# Patient Record
Sex: FEMALE | Race: BLACK OR AFRICAN AMERICAN | NOT HISPANIC OR LATINO | Employment: UNEMPLOYED | URBAN - METROPOLITAN AREA
[De-identification: names, ages, dates, MRNs, and addresses within clinical notes are randomized per-mention and may not be internally consistent; named-entity substitution may affect disease eponyms.]

---

## 2018-10-04 ENCOUNTER — HOSPITAL ENCOUNTER (EMERGENCY)
Facility: CLINIC | Age: 23
Discharge: HOME | End: 2018-10-04
Attending: EMERGENCY MEDICINE | Admitting: EMERGENCY MEDICINE
Payer: COMMERCIAL

## 2018-10-04 ENCOUNTER — APPOINTMENT (OUTPATIENT)
Dept: GENERAL RADIOLOGY | Facility: CLINIC | Age: 23
End: 2018-10-04
Attending: EMERGENCY MEDICINE
Payer: COMMERCIAL

## 2018-10-04 VITALS
BODY MASS INDEX: 22.98 KG/M2 | RESPIRATION RATE: 16 BRPM | TEMPERATURE: 98.8 F | OXYGEN SATURATION: 100 % | WEIGHT: 143 LBS | DIASTOLIC BLOOD PRESSURE: 47 MMHG | HEIGHT: 66 IN | SYSTOLIC BLOOD PRESSURE: 106 MMHG | HEART RATE: 72 BPM

## 2018-10-04 DIAGNOSIS — S29.011A CHEST WALL MUSCLE STRAIN, INITIAL ENCOUNTER: ICD-10-CM

## 2018-10-04 LAB — HCG UR QL: NEGATIVE

## 2018-10-04 PROCEDURE — 71046 X-RAY EXAM CHEST 2 VIEWS: CPT

## 2018-10-04 PROCEDURE — 99284 EMERGENCY DEPT VISIT MOD MDM: CPT

## 2018-10-04 PROCEDURE — 81025 URINE PREGNANCY TEST: CPT | Performed by: EMERGENCY MEDICINE

## 2018-10-04 PROCEDURE — 25000132 ZZH RX MED GY IP 250 OP 250 PS 637: Performed by: EMERGENCY MEDICINE

## 2018-10-04 RX ORDER — CYCLOBENZAPRINE HCL 10 MG
10 TABLET ORAL ONCE
Status: COMPLETED | OUTPATIENT
Start: 2018-10-04 | End: 2018-10-04

## 2018-10-04 RX ORDER — IBUPROFEN 400 MG/1
800 TABLET, FILM COATED ORAL ONCE
Status: COMPLETED | OUTPATIENT
Start: 2018-10-04 | End: 2018-10-04

## 2018-10-04 RX ORDER — CYCLOBENZAPRINE HCL 10 MG
10 TABLET ORAL 3 TIMES DAILY PRN
Qty: 15 TABLET | Refills: 0 | Status: SHIPPED | OUTPATIENT
Start: 2018-10-04 | End: 2019-04-16

## 2018-10-04 RX ORDER — TRAMADOL HYDROCHLORIDE 50 MG/1
50-100 TABLET ORAL EVERY 6 HOURS PRN
Qty: 12 TABLET | Refills: 0 | Status: SHIPPED | OUTPATIENT
Start: 2018-10-04 | End: 2019-04-16

## 2018-10-04 RX ADMIN — CYCLOBENZAPRINE HYDROCHLORIDE 10 MG: 10 TABLET, FILM COATED ORAL at 08:13

## 2018-10-04 RX ADMIN — IBUPROFEN 800 MG: 400 TABLET ORAL at 08:13

## 2018-10-04 ASSESSMENT — ENCOUNTER SYMPTOMS
SHORTNESS OF BREATH: 0
COUGH: 0
MYALGIAS: 1

## 2018-10-04 NOTE — DISCHARGE INSTRUCTIONS
Ibuprofen 800 mg 3 times a day, ice, Flexeril as needed for spasm.  Tramadol for more severe pain as needed.  Recheck in the clinic early next week if not resolving.    Opioid Medication Information  You have been given a prescription for an opioid (narcotic) pain medicine and/or have received a pain medicine while here in the emergency department. These medicines can make you drowsy or impaired. You must not drive, operate dangerous equipment, or engage in any other dangerous activities while taking these medications. If you drive while taking these medications, you could be arrested for DUI, or driving under the influence. Do not drink any alcohol while you are taking these medications.   Opioid pain medications can cause addiction. If you have a history of chemical dependency of any type, you are at a higher risk of becoming addicted to pain medications. Only take these prescribed medications to treat your pain when all other options have been tried. Take it for as short a time and as few doses as possible. Store your pain pills in a secure place, as they are frequently stolen and provide a dangerous opportunity for children or visitors in your house to start abusing these powerful medications. We will not replace any lost or stolen medicine. As soon as your pain is better, you should flush all your remaining medication.   Many prescription pain medications contain Tylenol (acetaminophen), including Vicodin, Tylenol #3, Norco, Lortab, and Percocet. You should not take any extra pills of Tylenol if you are using these prescription medications or you can get very sick. Do not ever take more than 4000 mg of acetaminophen in any 24 hour period.  All opioids tend to cause constipation. Drink plenty of water and eat foods that have a lot of fiber, such as fruits, vegetables, prune juice, apple juice and high fiber cereal. Take a laxative if you don t move your bowels at least every other day. Miralax, Milk of Magnesia,  Colace, or Senna can be used to keep you regular.

## 2018-10-04 NOTE — ED AVS SNAPSHOT
Emergency Department    [address] [place] [id number]    Phone:  [phone]    Fax:  [phone]                                       Marielena Rodriguez   MRN: [id number]    Department:   Emergency Department   Date of Visit:  10/4/2018           Patient Information     Date Of Birth          1995        Your diagnoses for this visit were:     Chest wall muscle strain, initial encounter        You were seen by Britni Martin MD.      Follow-up Information     Schedule an appointment as soon as possible for a visit with No Ref-Primary, Physician.    Why:  If symptoms worsen        Discharge Instructions       Ibuprofen 800 mg 3 times a day, ice, Flexeril as needed for spasm.  Tramadol for more severe pain as needed.  Recheck in the clinic early next week if not resolving.    Opioid Medication Information  You have been given a prescription for an opioid (narcotic) pain medicine and/or have received a pain medicine while here in the emergency department. These medicines can make you drowsy or impaired. You must not drive, operate dangerous equipment, or engage in any other dangerous activities while taking these medications. If you drive while taking these medications, you could be arrested for DUI, or driving under the influence. Do not drink any alcohol while you are taking these medications.   Opioid pain medications can cause addiction. If you have a history of chemical dependency of any type, you are at a higher risk of becoming addicted to pain medications. Only take these prescribed medications to treat your pain when all other options have been tried. Take it for as short a time and as few doses as possible. Store your pain pills in a secure place, as they are frequently stolen and provide a dangerous opportunity for children or visitors in your house to start abusing these powerful medications. We will not replace any lost or stolen medicine. As soon as your pain is better,  you should flush all your remaining medication.   Many prescription pain medications contain Tylenol (acetaminophen), including Vicodin, Tylenol #3, Norco, Lortab, and Percocet. You should not take any extra pills of Tylenol if you are using these prescription medications or you can get very sick. Do not ever take more than 4000 mg of acetaminophen in any 24 hour period.  All opioids tend to cause constipation. Drink plenty of water and eat foods that have a lot of fiber, such as fruits, vegetables, prune juice, apple juice and high fiber cereal. Take a laxative if you don t move your bowels at least every other day. Miralax, Milk of Magnesia, Colace, or Senna can be used to keep you regular.             Discharge References/Attachments     CHEST WALL STRAIN (CHILD) (ENGLISH)      24 Hour Appointment Hotline       To make an appointment at any [facility], call [phone] ([phone]). If you don't have a family doctor or clinic, we will help you find one. [place] clinics are conveniently located to serve the needs of you and your family.             Review of your medicines      START taking        Dose / Directions Last dose taken    traMADol 50 MG tablet   Commonly known as:  ULTRAM   Dose:   mg   Quantity:  12 tablet        Take 1-2 tablets ( mg) by mouth every 6 hours as needed for severe pain   Refills:  0                Information about OPIOIDS     PRESCRIPTION OPIOIDS: WHAT YOU NEED TO KNOW   We gave you an opioid (narcotic) pain medicine. It is important to manage your pain, but opioids are not always the best choice. You should first try all the other options your care team gave you. Take this medicine for as short a time (and as few doses) as possible.    Some activities can increase your pain, such as bandage changes or therapy sessions. It may help to take your pain medicine 30 to 60 minutes before these activities. Reduce your stress by getting enough sleep, working on hobbies  you enjoy and practicing relaxation or meditation. Talk to your care team about ways to manage your pain beyond prescription opioids.    These medicines have risks:    DO NOT drive when on new or higher doses of pain medicine. These medicines can affect your alertness and reaction times, and you could be arrested for driving under the influence (DUI). If you need to use opioids long-term, talk to your care team about driving.    DO NOT operate heavy machinery    DO NOT do any other dangerous activities while taking these medicines.    DO NOT drink any alcohol while taking these medicines.     If the opioid prescribed includes acetaminophen, DO NOT take with any other medicines that contain acetaminophen. Read all labels carefully. Look for the word  acetaminophen  or  Tylenol.  Ask your pharmacist if you have questions or are unsure.    You can get addicted to pain medicines, especially if you have a history of addiction (chemical, alcohol or substance dependence). Talk to your care team about ways to reduce this risk.    All opioids tend to cause constipation. Drink plenty of water and eat foods that have a lot of fiber, such as fruits, vegetables, prune juice, apple juice and high-fiber cereal. Take a laxative (Miralax, milk of magnesia, Colace, Senna) if you don t move your bowels at least every other day. Other side effects include upset stomach, sleepiness, dizziness, throwing up, tolerance (needing more of the medicine to have the same effect), physical dependence and slowed breathing.    Store your pills in a secure place, locked if possible. We will not replace any lost or stolen medicine. If you don t finish your medicine, please throw away (dispose) as directed by your pharmacist. The [place] [employer] has more information about safe disposal: https://www.pca.[facility].[place].us/living-green/managing-unwanted-medications        Prescriptions were sent or printed at these locations (1 Prescription)                    Other Prescriptions                Printed at Department/Unit printer (1 of 1)         traMADol (ULTRAM) 50 MG tablet                Procedures and tests performed during your visit     Chest XR,  PA & LAT    HCG qualitative urine      Orders Needing Specimen Collection     None      Pending Results     Date and Time Order Name Status Description    10/4/2018 0839 Chest XR,  PA & LAT In process             Pending Culture Results     No orders found from 10/2/2018 to 10/5/2018.            Pending Results Instructions     If you had any lab results that were not finalized at the time of your Discharge, you can call the ED Lab Result RN at [phone]. You will be contacted by this team for any positive Lab results or changes in treatment. The nurses are available 7 days a week from 10A to 6:30P.  You can leave a message 24 hours per day and they will return your call.        Test Results From Your Hospital Stay        10/4/2018  8:33 AM      Component Results     Component Value Ref Range & Units Status    HCG Qual Urine Negative NEG^Negative Final    This test is for screening purposes.  Results should be interpreted along with   the clinical picture.  Confirmation testing is available if warranted by   ordering ZMI032, HCG Quantitative Pregnancy.           10/4/2018  9:37 AM      Result not yet available     Exam Ended                Clinical Quality Measure: Blood Pressure Screening     Your blood pressure was checked while you were in the emergency department today. The last reading we obtained was  BP: 106/47 . Please read the guidelines below about what these numbers mean and what you should do about them.  If your systolic blood pressure (the top number) is less than 120 and your diastolic blood pressure (the bottom number) is less than 80, then your blood pressure is normal. There is nothing more that you need to do about it.  If your systolic blood pressure (the top number) is 120-139 or  "your diastolic blood pressure (the bottom number) is 80-89, your blood pressure may be higher than it should be. You should have your blood pressure rechecked within a year by a primary care provider.  If your systolic blood pressure (the top number) is 140 or greater or your diastolic blood pressure (the bottom number) is 90 or greater, you may have high blood pressure. High blood pressure is treatable, but if left untreated over time it can put you at risk for heart attack, stroke, or kidney failure. You should have your blood pressure rechecked by a primary care provider within the next 4 weeks.  If your provider in the emergency department today gave you specific instructions to follow-up with your doctor or provider even sooner than that, you should follow that instruction and not wait for up to 4 weeks for your follow-up visit.        Thank you for choosing [facility]       Thank you for choosing [facility] for your care. Our goal is always to provide you with excellent care. Hearing back from our patients is one way we can continue to improve our services. Please take a few minutes to complete the written survey that you may receive in the mail after you visit with us. Thank you!        [facility] Information     [facility] lets you send messages to your doctor, view your test results, renew your prescriptions, schedule appointments and more. To sign up, go to [email or web address] . Click on \"Log in\" on the left side of the screen, which will take you to the Welcome page. Then click on \"Sign up Now\" on the right side of the page.     You will be asked to enter the access code listed below, as well as some personal information. Please follow the directions to create your username and password.     Your access code is: [email or web address]  Expires: 2019  9:40 AM     Your access code will  in 90 days. If you need help or a new code, please call your [facility] clinic or [phone].        Care EveryWhere ID     This " is your Care EveryWhere ID. This could be used by other organizations to access your [facility] medical records  [id number]        Equal Access to Services     SUHAIL CORREIA : Orly Gonsalves, glenn go, alivia miller, jess gracia. So [facility] [phone].    ATENCIÓN: Si habla español, tiene a du disposición servicios gratuitos de asistencia lingüística. Llame al [phone].    We comply with applicable federal civil rights laws and [place] laws. We do not discriminate on the basis of race, color, national origin, age, disability, sex, sexual orientation, or gender identity.            After Visit Summary       This is your record. Keep this with you and show to your community pharmacist(s) and doctor(s) at your next visit.

## 2018-10-04 NOTE — ED AVS SNAPSHOT
Emergency Department    [address] [facility] [address]    Phone:  [phone]    Fax:  [phone]                                       Marielena Rodriguez   MRN: [id number]    Department:   Emergency Department   Date of Visit:  10/4/2018           After Visit Summary Signature Page     I have received my discharge instructions, and my questions have been answered. I have discussed any challenges I see with this plan with the nurse or doctor.    ..........................................................................................................................................  Patient/Patient Representative Signature      ..........................................................................................................................................  Patient Representative Print Name and Relationship to Patient    ..................................................               ................................................  Date                                   Time    ..........................................................................................................................................  Reviewed by Signature/Title    ...................................................              ..............................................  Date                                               Time          22EPIC Rev 08/18

## 2018-10-04 NOTE — ED PROVIDER NOTES
"  History     Chief Complaint:  Breast Pain    HPI   Marielena Rodriguez is a 23 year old female who presents to the emergency department today for evaluation of right breast and rib pain. The patient describes 3 days of pain on the lateral right breast that wraps around to her back. She notes the pain is worsened when she takes a deep breath, twists, or lifts her right arm up. She is unaware of any injury but states that she does some light lifting at work and recently moved. She denies any coughing, sneezing, shortness of breath, leg swelling/pain, recent surgery, or recent travel. She has not taken anything for the pain.     Allergies:  No Known Drug Allergies    Medications:    Medications reviewed. No current medications.     Past Medical History:    Medical history reviewed. No pertinent medical history.    Past Surgical History:    Surgical history reviewed. No pertinent surgical history.    Family History:    Family history reviewed. No pertinent family history.     Social History:  Smoking Status: Never Smoker  Alcohol Use: Negative     Review of Systems   HENT: Negative for sneezing.    Respiratory: Negative for cough and shortness of breath.    Cardiovascular: Negative for leg swelling.   Musculoskeletal: Positive for myalgias.   All other systems reviewed and are negative.      Physical Exam     Patient Vitals for the past 24 hrs:   BP Temp Temp src Pulse Heart Rate Resp SpO2 Height Weight   10/04/18 0955 - - - 72 - 16 100 % - -   10/04/18 0740 106/47 98.8  F (37.1  C) Oral - 78 16 100 % 1.676 m (5' 6\") 64.9 kg (143 lb)       Physical Exam  Nursing note and vitals reviewed.    Constitutional:  Appears well-developed and well-nourished, comfortable except when taking a deep breath or moving around and then she grimaces.  Lymph:  No enlarged or tender cervical or submandibular lymph nodes.   Cardiovascular:  Normal rate, regular rhythm with normal S1 and S2.      Normal heart sounds and peripheral pulses 2+ " and equal.       No murmur or kevin.  Pulmonary:  Effort normal and breath sounds clear to auscultation bilaterally but she does splint when taking a deep breath due to pain on the right side.         No respiratory distress.  No stridor.     No wheezes. No rales.     Reproducible chest wall tenderness in the intercostal space in the midaxillary line extending towards the lateral breast on the right.  No rash, no swelling or bruising.  GI:    Soft. No distension and no mass. No right upper quadrant tenderness.      No rebound and no guarding. No flank pain.  No HSM.  Musculoskeletal:  Normal range of motion. No extremity deformity.     No edema and no tenderness.  No cyanosis.  Negative Homans sign.     Reproducible tenderness over the right lateral chest at the breast level at the intercostal space and breast tissue.   Skin:    Skin is warm and dry. No rash noted. No diaphoresis.      No erythema. No pallor.  No lesions.  Psychiatric:   Behavior is normal. Appropriate mood and affect.     Judgment and thought content normal.         Emergency Department Course   Imaging:  Radiology findings were communicated with the patient who voiced understanding of the findings.    Chest XR,  PA & LAT  Negative exam.  Reading per radiology     Laboratory:  Laboratory findings were communicated with the patient who voiced understanding of the findings.    HCG Qualitative Urine: Negative    Interventions:  0813 Advil 800 mg PO  0813 Flexeril 10 mg PO    Emergency Department Course:    0754 Nursing notes and vitals reviewed.    0758 I performed an exam of the patient as documented above.     0937 The patient was sent for a Chest XR,  PA & LAT while in the emergency department, results above.     0958 I personally reviewed the imaging and laboratory results with the patient and answered all related questions prior to discharge.    Impression & Plan      Medical Decision Making:  Marielena Rodriguez is a 23 year old female who  presents to the emergency department today for evaluation of right lateral chest pain.  Hurts when she moves around.  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  They also had moved recently but states she did not lift anything heavy.  She is not short of breath but it does hurt when she takes a deep breath.  No risk factors for PE and she is PERC rule negative.  She has not been sick recently.  I gave her 800 of Motrin orally and 10 of Flexeril orally.  Pregnancy test was obtained and was negative, a chest x-ray is ordered and is normal.  The ibuprofen and Flexeril only gave her slight relief from the pain.  I do not feel she needs further workup at this time.  This is musculoskeletal.  I am going to prescribe her some tramadol for the more severe pain and have her follow-up in the clinic next week if this is not resolving.    Diagnosis:    ICD-10-CM    1. Chest wall muscle strain, initial encounter S29.011A      Disposition:   The patient is discharged to home. Ibuprofen 800 mg 3 times a day, ice, Flexeril as needed for spasm. Tramadol for more severe pain as needed.  Recheck in the clinic early next week if not resolving.    Discharge Medications:  Discharge Medication List as of 10/4/2018  9:49 AM      START taking these medications    Details   traMADol (ULTRAM) 50 MG tablet Take 1-2 tablets ( mg) by mouth every 6 hours as needed for severe pain, Disp-12 tablet, R-0, Local Print           Scribe Disclosure:  Jade ORTIZ, am serving as a scribe at 7:54 AM on 10/4/2018 to document services personally performed by Britni Martin MD based on my observations and the provider's statements to me.     EMERGENCY DEPARTMENT       Britni Martin MD  10/04/18 8759

## 2018-11-12 ENCOUNTER — OFFICE VISIT (OUTPATIENT)
Dept: OBGYN | Facility: CLINIC | Age: 23
End: 2018-11-12
Payer: COMMERCIAL

## 2018-11-12 VITALS — WEIGHT: 145 LBS | SYSTOLIC BLOOD PRESSURE: 98 MMHG | BODY MASS INDEX: 23.4 KG/M2 | DIASTOLIC BLOOD PRESSURE: 68 MMHG

## 2018-11-12 DIAGNOSIS — Z30.46 NEXPLANON REMOVAL: Primary | ICD-10-CM

## 2018-11-12 PROCEDURE — 11982 REMOVE DRUG IMPLANT DEVICE: CPT | Performed by: ADVANCED PRACTICE MIDWIFE

## 2018-11-12 NOTE — PROGRESS NOTES
Nexplanon Removal:     Is a pregnancy test required: No.  Was a consent obtained?  Yes    Marielena Rodriguez is here for removal of etonogestrel implant Nexplanon/Implanon    Indication: Patient has been satisfied with ease of use of Nexplanon and has not been bothered by irregular bleeding, but she would like to be free from contraception. She is newly  and she and her  are not actively seeking pregnancy, but would be fine if she were to become pregnant.       Preoperative Diagnosis: etonogestrel implant  Postoperative Diagnosis: etonogestrel implant removed    Technique: On the left arm  Skin prep Betadine  Anesthesia 1% lidocaine, with epi  Procedure: Small incision (<5mm) was made at distal end of palpable implant, curved hemostat  was used to isolate the implant and bring it to the incision, the fibrous capsule containing the implant  was incised and the Implant was removed intact.    EBL: minimal  Complications:  No  Tolerance:  Pt tolerated procedure well and was in stable condition.   Dressing:    A pressure bandage was placed for the next 12-24 hours.    Contraception was discussed and patient chose the following method none.   Counseled patient that with immediate removal she is considered fertile and possibly could become pregnant. Educated her that condoms would be necessary to help prevent pregnancy from this point. She cannot remember the exact date of her LMP, but knows it was the end of November. Informed patient that if she does not resume menses in 3-4 weeks, she should take a pregnancy test. Informed patient that if she changes her mind about needing contraception she can return to the clinic for a new method.       Follow up: Pt was instructed to call if bleeding, severe pain or foul smell.     Maddie Piña CNM

## 2018-11-12 NOTE — NURSING NOTE
"Chief Complaint   Patient presents with     Consult     nexplanon removal, placed 6/2018       Initial BP 98/68 (BP Location: Right arm, Cuff Size: Adult Regular)  Wt 145 lb (65.8 kg)  LMP 09/23/2018 (Approximate)  BMI 23.4 kg/m2 Estimated body mass index is 23.4 kg/(m^2) as calculated from the following:    Height as of 10/4/18: 5' 6\" (1.676 m).    Weight as of this encounter: 145 lb (65.8 kg).  BP completed using cuff size: regular    Questioned patient about current smoking habits.  Pt. has never smoked.      No obstetric history on file.    The following HM Due: NONE      Kely Lockhart CMA             "

## 2018-11-12 NOTE — MR AVS SNAPSHOT
"              After Visit Summary   2018    Marielena Rodriguez    MRN: [id number]           Patient Information     Date Of Birth          1995        Visit Information        Provider Department      2018 5:00 PM Maddie Piña CNM [facility]        Today's Diagnoses     Nexplanon removal    -  1       Follow-ups after your visit        Who to contact     If you have questions or need follow up information about today's clinic visit or your schedule please contact [facility] directly at [phone].  Normal or non-critical lab and imaging results will be communicated to you by Lendinohart, letter or phone within 4 business days after the clinic has received the results. If you do not hear from us within 7 days, please contact the clinic through Lendinohart or phone. If you have a critical or abnormal lab result, we will notify you by phone as soon as possible.  Submit refill requests through Brainsgate or call your pharmacy and they will forward the refill request to us. Please allow 3 business days for your refill to be completed.          Additional Information About Your Visit        MyChart Information     Brainsgate lets you send messages to your doctor, view your test results, renew your prescriptions, schedule appointments and more. To sign up, go to [email or web address] . Click on \"Log in\" on the left side of the screen, which will take you to the Welcome page. Then click on \"Sign up Now\" on the right side of the page.     You will be asked to enter the access code listed below, as well as some personal information. Please follow the directions to create your username and password.     Your access code is: [email or web address]  Expires: 2/10/2019  5:04 PM     Your access code will  in 90 days. If you need help or a new code, please call your [facility] or [phone].        Care EveryWhere ID     This is your Care EveryWhere ID. This could be " used by other organizations to access your [facility] medical records  [id number]        Your Vitals Were     Last Period BMI (Body Mass Index)                09/23/2018 (Approximate) 23.4 kg/m2           Blood Pressure from Last 3 Encounters:   11/12/18 98/68   10/04/18 106/47    Weight from Last 3 Encounters:   11/12/18 145 lb (65.8 kg)   10/04/18 143 lb (64.9 kg)              We Performed the Following     [facility]        Primary Care Provider Fax #    Physician No Ref-Primary [phone]       No address on file        Equal Access to Services     [facility]: Hadii ernestine sandso Soaliza, waaxda luqadaha, qaybta kaalmada angela, jess plata . So [facility] [phone].    ATENCIÓN: Si habla español, tiene a du disposición servicios gratuitos de asistencia lingüística. Llame al [phone].    We comply with applicable federal civil rights laws and [place] laws. We do not discriminate on the basis of race, color, national origin, age, disability, sex, sexual orientation, or gender identity.            Thank you!     Thank you for choosing [facility]  for your care. Our goal is always to provide you with excellent care. Hearing back from our patients is one way we can continue to improve our services. Please take a few minutes to complete the written survey that you may receive in the mail after your visit with us. Thank you!             Your Updated Medication List - Protect others around you: Learn how to safely use, store and throw away your medicines at www.disposemymeds.org.          This list is accurate as of 11/12/18 11:59 PM.  Always use your most recent med list.                   Brand Name Dispense Instructions for use Diagnosis    cyclobenzaprine 10 MG tablet    FLEXERIL    15 tablet    Take 1 tablet (10 mg) by mouth 3 times daily as needed for muscle spasms        traMADol 50 MG tablet    ULTRAM    12 tablet    Take 1-2 tablets  ( mg) by mouth every 6 hours as needed for severe pain

## 2018-12-10 ENCOUNTER — OFFICE VISIT (OUTPATIENT)
Dept: OBGYN | Facility: CLINIC | Age: 23
End: 2018-12-10
Payer: COMMERCIAL

## 2018-12-10 VITALS — DIASTOLIC BLOOD PRESSURE: 58 MMHG | BODY MASS INDEX: 24.39 KG/M2 | SYSTOLIC BLOOD PRESSURE: 92 MMHG | WEIGHT: 151.1 LBS

## 2018-12-10 DIAGNOSIS — N91.2 AMENORRHEA: Primary | ICD-10-CM

## 2018-12-10 DIAGNOSIS — Z23 NEED FOR PROPHYLACTIC VACCINATION AND INOCULATION AGAINST INFLUENZA: ICD-10-CM

## 2018-12-10 LAB — BETA HCG QUAL IFA URINE: NEGATIVE

## 2018-12-10 PROCEDURE — 90471 IMMUNIZATION ADMIN: CPT | Performed by: ADVANCED PRACTICE MIDWIFE

## 2018-12-10 PROCEDURE — 99213 OFFICE O/P EST LOW 20 MIN: CPT | Mod: 25 | Performed by: ADVANCED PRACTICE MIDWIFE

## 2018-12-10 PROCEDURE — 84703 CHORIONIC GONADOTROPIN ASSAY: CPT | Performed by: ADVANCED PRACTICE MIDWIFE

## 2018-12-10 PROCEDURE — 90686 IIV4 VACC NO PRSV 0.5 ML IM: CPT | Performed by: ADVANCED PRACTICE MIDWIFE

## 2018-12-10 NOTE — NURSING NOTE
"Chief Complaint   Patient presents with     Amenorrhea     Concerned of no menses since IUD removal 1 month ago.   States she desires pregnancy    Initial BP 92/58   Wt 68.5 kg (151 lb 1.6 oz)   BMI 24.39 kg/m   Estimated body mass index is 24.39 kg/m  as calculated from the following:    Height as of 10/4/18: 1.676 m (5' 6\").    Weight as of this encounter: 68.5 kg (151 lb 1.6 oz).  BP completed using cuff size: regular    Questioned patient about current smoking habits.  Pt. has never smoked.          The following HM Due: Vaccinations: flu shot      orders have been placed     Daphney Foy CMA               "

## 2018-12-10 NOTE — PROGRESS NOTES

## 2018-12-10 NOTE — PROGRESS NOTES
S: Marielena presents to the clinic s/p Nexplanon removal 4 weeks ago. She and her  are currently seeking pregnancy and she is concerned that she has not gotten her period since removal. Prior to nexplanon insertion, she had regular monthly cycles and had the nexplanon in for approximately 6 months.    O:BP 92/58   Wt 68.5 kg (151 lb 1.6 oz)   BMI 24.39 kg/m    UPT: Negative  Review Of Systems  Respiratory: No shortness of breath, dyspnea on exertion, cough, or hemoptysis  Genitourinary: amenorrhea    A: Amenorrhea  Preconception planning    P:  Informed patient that the majority of women resume their menstrual cycle within 4 weeks of nexplanon removal, but some require more time.   Instructed her that if she does not get a period in 2-3 weeks, she should take a pregnancy test at home. If it is positive, return to clinic to assume prenatal care.  If she does not resume a cycle in 3 months, she can return to the clinic to discuss further.  Counseled patient to begin taking a daily prenatal vitamin.     15 minutes was spent face to face with the patient today discussing her history, diagnosis, and follow-up plan as noted above. Over 50% of the visit was spent in counseling and coordination of care.    Total Visit Time: 15 minutes.       Maddie Piña CNM on 12/10/2018 at 5:44 PM

## 2019-01-15 NOTE — PROGRESS NOTES
[facility]   Clinic Visit s Jan 16, 2019    PCP: No Ref-Primary, Physician      Marielena is a 23 year old female who is seen as self referral for   Chief Complaint   Patient presents with     Spine - Pain     Middle Back - Pain       Injury: Reports insidious onset without acute precipitating event.    Location of Pain: midline upper back, nonradiating   Duration of Pain: 1 month(s)  Rating of Pain at worst: 8/10  Rating of Pain Currently: 7/10  Pain is better with: nothing   Pain is worse with: work responsibilities, carrying (trash, groceries, laundry), bending over,   Treatment so far consists of:  lumbar support, pillow on chair    Associated symptoms: no distal numbness or tingling; denies swelling or warmth  Recent imaging completed: No recent imaging completed.  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         Family History   Problem Relation Age of Onset     No Known Problems Mother      No Known Problems Father      Diabetes Maternal Grandmother        Social History     Socioeconomic History     Marital status: Single     Spouse name: Not on file     Number of children: Not on file     Years of education: Not on file     Highest education level: Not on file   Social Needs     Financial resource strain: Not on file     Food insecurity - worry: Not on file     Food insecurity - inability: Not on file     Transportation needs - medical: Not on file     Transportation needs - non-medical: Not on file   Occupational History     Not on file   Tobacco Use     Smoking status: Never Smoker     Smokeless tobacco: Never Used   Substance and Sexual Activity     Alcohol use: No     Drug use: No       Past Surgical History:   Procedure Laterality Date     NO HISTORY OF SURGERY  12/10/2018             Review of Systems   Constitutional: Negative for chills, diaphoresis, fever,  "malaise/fatigue and weight loss.   Musculoskeletal: Positive for back pain.   Neurological: Negative for tingling, sensory change and focal weakness.   All other systems reviewed and are negative.        Physical Exam  /55   Ht 1.676 m (5' 6\")   Wt 70.3 kg (155 lb)   BMI 25.02 kg/m    Constitutional:well-developed, well-nourished, and in no distress.   Cardiovascular: Intact distal pulses.    Neurological: alert. Gait Normal:   Gait, station, stance, and balance appear normal for age  Skin: Skin is warm and dry.   Psychiatric: Mood and affect normal.   Respiratory: unlabored, speaks in full sentences  Lymph: no LAD, no lymphangitis      Back Exam     Tenderness   The patient is experiencing tenderness in the thoracic.    Range of Motion   Extension: normal   Flexion: normal   Lateral bend right: normal   Lateral bend left: normal   Rotation right: normal   Rotation left: normal     Comments:  Mild hyperlordosis    Tightness noted with rotation in both directions          ASSESSMENT/PLAN    ICD-10-CM    1. Chronic midline thoracic back pain M54.6 [facility]    G89.29      Postural component suspected to thoracic back pain, with myofascial restrictions in both directions.  Reviewed ergonomics with patient regarding workspace.  Referred to physical therapy for manual therapy for mobilization.   "

## 2019-01-16 ENCOUNTER — OFFICE VISIT (OUTPATIENT)
Dept: ORTHOPEDICS | Facility: CLINIC | Age: 24
End: 2019-01-16
Payer: COMMERCIAL

## 2019-01-16 VITALS
DIASTOLIC BLOOD PRESSURE: 55 MMHG | WEIGHT: 155 LBS | HEIGHT: 66 IN | SYSTOLIC BLOOD PRESSURE: 101 MMHG | BODY MASS INDEX: 24.91 KG/M2

## 2019-01-16 DIAGNOSIS — M54.6 CHRONIC MIDLINE THORACIC BACK PAIN: Primary | ICD-10-CM

## 2019-01-16 DIAGNOSIS — G89.29 CHRONIC MIDLINE THORACIC BACK PAIN: Primary | ICD-10-CM

## 2019-01-16 PROCEDURE — 99203 OFFICE O/P NEW LOW 30 MIN: CPT | Performed by: FAMILY MEDICINE

## 2019-01-16 ASSESSMENT — ENCOUNTER SYMPTOMS
TINGLING: 0
DIAPHORESIS: 0
WEIGHT LOSS: 0
CHILLS: 0
BACK PAIN: 1
SENSORY CHANGE: 0
FEVER: 0
FOCAL WEAKNESS: 0

## 2019-01-16 ASSESSMENT — MIFFLIN-ST. JEOR: SCORE: 1484.83

## 2019-01-16 NOTE — LETTER
1/16/2019         RE: Marielena Rodriguez  [address]  [place] [address]        Dear Colleague,    Thank you for referring your patient, Marielena Rodriguze, to the  SPORTS MEDICINE. Please see a copy of my visit note below.    HPI   [facility]   Clinic Visit s Jan 16, 2019    PCP: No Ref-Primary, Physician      Marielena is a 23 year old female who is seen as self referral for   Chief Complaint   Patient presents with     Spine - Pain     Middle Back - Pain       Injury: Reports insidious onset without acute precipitating event.    Location of Pain: midline upper back, nonradiating   Duration of Pain: 1 month(s)  Rating of Pain at worst: 8/10  Rating of Pain Currently: 7/10  Pain is better with: nothing   Pain is worse with: work responsibilities, carrying (trash, groceries, laundry), bending over,   Treatment so far consists of:  lumbar support, pillow on chair    Associated symptoms: no distal numbness or tingling; denies swelling or warmth  Recent imaging completed: No recent imaging completed.  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         Family History   Problem Relation Age of Onset     No Known Problems Mother      No Known Problems Father      Diabetes Maternal Grandmother        Social History     Socioeconomic History     Marital status: Single     Spouse name: Not on file     Number of children: Not on file     Years of education: Not on file     Highest education level: Not on file   Social Needs     Financial resource strain: Not on file     Food insecurity - worry: Not on file     Food insecurity - inability: Not on file     Transportation needs - medical: Not on file     Transportation needs - non-medical: Not on file   Occupational History     Not on file   Tobacco Use     Smoking status: Never Smoker     Smokeless tobacco: Never Used   Substance and  "Sexual Activity     Alcohol use: No     Drug use: No       Past Surgical History:   Procedure Laterality Date     NO HISTORY OF SURGERY  12/10/2018             Review of Systems   Constitutional: Negative for chills, diaphoresis, fever, malaise/fatigue and weight loss.   Musculoskeletal: Positive for back pain.   Neurological: Negative for tingling, sensory change and focal weakness.   All other systems reviewed and are negative.        Physical Exam  /55   Ht 1.676 m (5' 6\")   Wt 70.3 kg (155 lb)   BMI 25.02 kg/m     Constitutional:well-developed, well-nourished, and in no distress.   Cardiovascular: Intact distal pulses.    Neurological: alert. Gait Normal:   Gait, station, stance, and balance appear normal for age  Skin: Skin is warm and dry.   Psychiatric: Mood and affect normal.   Respiratory: unlabored, speaks in full sentences  Lymph: no LAD, no lymphangitis      Back Exam     Tenderness   The patient is experiencing tenderness in the thoracic.    Range of Motion   Extension: normal   Flexion: normal   Lateral bend right: normal   Lateral bend left: normal   Rotation right: normal   Rotation left: normal     Comments:  Mild hyperlordosis    Tightness noted with rotation in both directions          ASSESSMENT/PLAN    ICD-10-CM    1. Chronic midline thoracic back pain M54.6 [facility]    G89.29      Postural component suspected to thoracic back pain, with myofascial restrictions in both directions.  Reviewed ergonomics with patient regarding workspace.  Referred to physical therapy for manual therapy for mobilization.     Again, thank you for allowing me to participate in the care of your patient.        Sincerely,        Vamsi Nava MD    "

## 2019-01-18 ENCOUNTER — THERAPY VISIT (OUTPATIENT)
Dept: PHYSICAL THERAPY | Facility: CLINIC | Age: 24
End: 2019-01-18
Payer: COMMERCIAL

## 2019-01-18 DIAGNOSIS — M54.6 CHRONIC MIDLINE THORACIC BACK PAIN: ICD-10-CM

## 2019-01-18 DIAGNOSIS — M54.6 BILATERAL THORACIC BACK PAIN: ICD-10-CM

## 2019-01-18 DIAGNOSIS — G89.29 CHRONIC MIDLINE THORACIC BACK PAIN: ICD-10-CM

## 2019-01-18 PROCEDURE — 97110 THERAPEUTIC EXERCISES: CPT | Mod: GP | Performed by: PHYSICAL THERAPIST

## 2019-01-18 PROCEDURE — 97162 PT EVAL MOD COMPLEX 30 MIN: CPT | Mod: GP | Performed by: PHYSICAL THERAPIST

## 2019-01-25 PROBLEM — M54.6 BILATERAL THORACIC BACK PAIN: Status: ACTIVE | Noted: 2019-01-25

## 2019-01-25 NOTE — PROGRESS NOTES
[facility] Initial [facility]  Subjective:Marielena Rodriguez is a 23 year old female.    Patient s chief complaints: Mid thoracic spine pain which started about 1 month ago. Unknown reason why other than started a new job about 6 weeks ago. 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 C/O pain right upper thoracic spine.    Condition occurred due to insidious onset.  Date of Onset: 12/15/18  Location of symptoms is upper right thoracic spine .  Symptoms other than pain include: aching, throbbing, stabbing.    Quality of pain is aching, throbbing, stabbing and frequency is constant.    Pain dependence on time of day is: worse end of day.   Pain rating is: 9/10.    Symptoms are exacerbated by: movement, use of arm and upper back .    Symptoms are relieved by:  nothing.    Progression of symptoms is that symptoms are:  Getting worse.  Imaging/Special tests include: none   Previous treatments include: none.   Patient reports that general health is: fair.   Pertinent medical history includes:  Migraines/headaches.    Medical allergies includes: none.   Surgical history includes: none.  Current medications include: none  Current occupation is: patient service rep  Work status is: full time  Primary job tasks include: computer work  Barriers include:  none  Red flags include: none    Patient's expectations for therapy include: Be able to perform all activities       Assessment/Plan:    HPI                    Objective:  System         Lumbar/SI Evaluation              Lumbar Palpation:  Palpation (lumbar): Moderate tightness iliolumbar, lat bilateral.            SI joint/Sacrum:            Sacral conclusion left:  Posterior inominate and sacral torsion       Cervical/Thoracic Evaluation    AROM:  AROM Cervical:    Flexion:          60 degrees  Extension:       50 degrees  Rotation:         Left:     Right:  Side Bend:      Left: 30 degrees      Right:  35 degrees  AROM Thoracic:    Flexion:              Extension:           Rotation:            Left: 35 degrees pain     Right: 45 degrees        Cervical Myotomes:    C1-2 (Neck Flex): Left:  5    Right: 5  C3 (neck side bend): Left: 5    Right: 5  C4 (shrug):  Left: 5    Right: 5  C5 (Deltoid):  Left: 5    Right: 5  C6 (Biceps):  Left: 5    Right: 5  C7 (Triceps):  Left: 5    Right: 5  C8 (Thumb Ext): Left: 5    Right: 5  T1 (Intrinsics): Left: 5    Right: 5  DTR's:  normal        Neural Tension:    Left side:  Ulnar and Median positive.  Right side:  Ulnar and Median positive.  Cervical Dermatomes:  normal                    Cervical Palpation:  : Tender to palpation rhomboid, mid trap, infraspinatus, intercostals, right greatere than left.          Spinal Segmental Conclusions:  Minimal spring ribs 2-5 right.        Cord Sign:  normal                                            General  Patient c/o pain with even light touch to upper thoracic spine region. Unable to perform even light evaluation of thoracic facet joints, rib attachments and muscular tightness due to patient complaints of pain.     ROS    Assessment/Plan:    Patient is a 23 year old female with cervical and thoracic complaints.    Patient has the following significant findings with corresponding treatment plan.                Diagnosis 1:  Thoracic pain  Pain -  hot/cold therapy, manual therapy, self management, education and home program  Decreased ROM/flexibility - manual therapy, therapeutic exercise and therapeutic activity  Impaired posture - neuro re-education, therapeutic activities and home program    Therapy Evaluation Codes:   1) History comprised of:   Personal factors that impact the plan of care:      Past/current experiences.    Comorbidity factors that impact the plan of care are:      Migraines/headaches.     Medications impacting care: None.  2) Examination of Body Systems comprised of:   Body structures and functions that  impact the plan of care:      Cervical spine and Thoracic Spine.   Activity limitations that impact the plan of care are:      Lifting, Reading/Computer work, Sitting, Sleeping and Laying down.  3) Clinical presentation characteristics are:   Stable/Uncomplicated.  4) Decision-Making    Moderate complexity using standardized patient assessment instrument and/or measureable assessment of functional outcome.  Cumulative Therapy Evaluation is: Moderate complexity.    Previous and current functional limitations:  (See Goal Flow Sheet for this information)    Short term and Long term goals: (See Goal Flow Sheet for this information)     Communication ability:  Patient appears to be able to clearly communicate and understand verbal and written communication and follow directions correctly.  Treatment Explanation - The following has been discussed with the patient:   RX ordered/plan of care  Anticipated outcomes  Possible risks and side effects  This patient would benefit from PT intervention to resume normal activities.   Rehab potential is good.    Frequency:  1 X week, once daily  Duration:  for 8 weeks  Discharge Plan:  Achieve all LTG.  Independent in home treatment program.  Reach maximal therapeutic benefit.    Please refer to the daily flowsheet for treatment today, total treatment time and time spent performing 1:1 timed codes.

## 2019-01-25 NOTE — PROGRESS NOTES
Marielena Rodriguez is a 23 year old female.    Patient s chief complaints: Mid thoracic spine pain which started about 1 month ago. Unknown reason why other than started a new job about 6 weeks ago. 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 C/O pain right upper thoracic spine.    Condition occurred due to insidious onset.  Date of Onset: 12/15/18  Location of symptoms is upper right thoracic spine .  Symptoms other than pain include: aching, throbbing, stabbing.    Quality of pain is aching, throbbing, stabbing and frequency is constant.    Pain dependence on time of day is: worse end of day.   Pain rating is: 9/10.    Symptoms are exacerbated by: movement, use of arm and upper back .    Symptoms are relieved by:  nothing.    Progression of symptoms is that symptoms are:  Getting worse.  Imaging/Special tests include: none   Previous treatments include: none.   Patient reports that general health is: fair.   Pertinent medical history includes:  Migraines/headaches.    Medical allergies includes: none.   Surgical history includes: none.  Current medications include: none  Current occupation is: patient service rep  Work status is: full time  Primary job tasks include: computer work  Barriers include:  none  Red flags include: none    Patient's expectations for therapy include: Be able to perform all activities       Assessment/Plan:    {REHAB NOTES:688929}

## 2019-01-31 ENCOUNTER — TELEPHONE (OUTPATIENT)
Dept: MIDWIFE SERVICES | Facility: CLINIC | Age: 24
End: 2019-01-31

## 2019-01-31 ENCOUNTER — OFFICE VISIT (OUTPATIENT)
Dept: INTERNAL MEDICINE | Facility: CLINIC | Age: 24
End: 2019-01-31
Payer: COMMERCIAL

## 2019-01-31 VITALS
WEIGHT: 153 LBS | TEMPERATURE: 97.7 F | SYSTOLIC BLOOD PRESSURE: 124 MMHG | BODY MASS INDEX: 24.69 KG/M2 | HEART RATE: 60 BPM | DIASTOLIC BLOOD PRESSURE: 64 MMHG | RESPIRATION RATE: 16 BRPM

## 2019-01-31 DIAGNOSIS — G44.209 TENSION HEADACHE: ICD-10-CM

## 2019-01-31 DIAGNOSIS — G43.821 MENSTRUAL MIGRAINE WITH STATUS MIGRAINOSUS, NOT INTRACTABLE: Primary | ICD-10-CM

## 2019-01-31 LAB
ALBUMIN UR-MCNC: NEGATIVE MG/DL
APPEARANCE UR: CLEAR
BACTERIA #/AREA URNS HPF: ABNORMAL /HPF
BILIRUB UR QL STRIP: NEGATIVE
COLOR UR AUTO: YELLOW
GLUCOSE UR STRIP-MCNC: NEGATIVE MG/DL
HGB UR QL STRIP: NEGATIVE
KETONES UR STRIP-MCNC: NEGATIVE MG/DL
LEUKOCYTE ESTERASE UR QL STRIP: ABNORMAL
NITRATE UR QL: NEGATIVE
NON-SQ EPI CELLS #/AREA URNS LPF: ABNORMAL /LPF
PH UR STRIP: 6 PH (ref 5–7)
RBC #/AREA URNS AUTO: ABNORMAL /HPF
SOURCE: ABNORMAL
SP GR UR STRIP: 1.02 (ref 1–1.03)
UROBILINOGEN UR STRIP-ACNC: 0.2 EU/DL (ref 0.2–1)
WBC #/AREA URNS AUTO: ABNORMAL /HPF

## 2019-01-31 PROCEDURE — 99214 OFFICE O/P EST MOD 30 MIN: CPT | Performed by: PHYSICIAN ASSISTANT

## 2019-01-31 PROCEDURE — 81001 URINALYSIS AUTO W/SCOPE: CPT | Performed by: PHYSICIAN ASSISTANT

## 2019-01-31 PROCEDURE — 87086 URINE CULTURE/COLONY COUNT: CPT | Performed by: PHYSICIAN ASSISTANT

## 2019-01-31 RX ORDER — IBUPROFEN 400 MG/1
400 TABLET, FILM COATED ORAL EVERY 6 HOURS PRN
Qty: 30 TABLET | Refills: 1 | Status: SHIPPED | OUTPATIENT
Start: 2019-01-31 | End: 2019-11-12

## 2019-01-31 NOTE — PROGRESS NOTES
SUBJECTIVE:   Marielena Rodriguez is a 23 year old female who presents to clinic today for the following health issues:    Headache  Onset: 3 weeks     Description:   Location: bilateral in the frontal area   Character: banging   Frequency:  daily  Duration:  1-3 hours    Intensity: severe    Progression of Symptoms:  worsening    Accompanying Signs & Symptoms:  Stiff neck: no  Neck or upper back pain: YES- middle back pain  Fever: no  Sinus pressure: no  Nausea or vomiting: YES- nausea   Dizziness: YES  Numbness: no  Weakness: YES  Visual changes: no    History:   Head trauma: no  Family history of migraines: no  Previous tests for headaches: no  Neurologist evaluations: no  Able to do daily activities: no  Wake with a headaches: YES- today  Do headaches wake you up: YES  Daily pain medication use: no  Work/school stressors/changes: no    Precipitating factors:   Does light make it worse: no  Does sound make it worse: YES    Alleviating factors:  Does sleep help: no    Therapies Tried and outcome: none    Problem list and histories reviewed & adjusted, as indicated.  Additional history: Patient is taking prenatal vitamins and is attempting to get pregnant.  Took a home pregnancy test this am which was negative.  Hasn't taken any OTC pain medications as of yet.     Reviewed and updated as needed this visit by clinical staff  Tobacco  Allergies  Meds  Med Hx  Surg Hx  Fam Hx  Soc Hx      ROS:  Constitutional, HEENT, cardiovascular, pulmonary, gi and gu systems are negative, except as otherwise noted.    OBJECTIVE:     /64   Pulse 60   Temp 97.7  F (36.5  C) (Oral)   Resp 16   Wt 69.4 kg (153 lb)   LMP 12/25/2018   Breastfeeding? No   BMI 24.69 kg/m    Body mass index is 24.69 kg/m .  GENERAL: healthy, alert and no distress  EYES: Eyes grossly normal to inspection, PERRL and conjunctivae and sclerae normal  HENT: ear canals and TM's normal, nose and mouth without ulcers or lesions  NECK: no  adenopathy, no asymmetry, masses, or scars and thyroid normal to palpation  normal strength, no torticollis and ROM is normal  SKIN: no suspicious lesions or rashes  NEURO: Normal strength and tone, mentation intact and speech normal    Diagnostic Test Results:  Results for orders placed or performed in visit on 01/31/19 (from the past 24 hour(s))   UA reflex to Microscopic and Culture   Result Value Ref Range    Color Urine Yellow     Appearance Urine Clear     Glucose Urine Negative NEG^Negative mg/dL    Bilirubin Urine Negative NEG^Negative    Ketones Urine Negative NEG^Negative mg/dL    Specific Gravity Urine 1.020 1.003 - 1.035    Blood Urine Negative NEG^Negative    pH Urine 6.0 5.0 - 7.0 pH    Protein Albumin Urine Negative NEG^Negative mg/dL    Urobilinogen Urine 0.2 0.2 - 1.0 EU/dL    Nitrite Urine Negative NEG^Negative    Leukocyte Esterase Urine Trace (A) NEG^Negative    Source Midstream Urine    Urine Microscopic   Result Value Ref Range    WBC Urine 0 - 5 OTO5^0 - 5 /HPF    RBC Urine O - 2 OTO2^O - 2 /HPF    Squamous Epithelial /LPF Urine Many (A) FEW^Few /LPF    Bacteria Urine Few (A) NEG^Negative /HPF       ASSESSMENT/PLAN:   1. Menstrual migraine with status migrainosus, not intractable  - UA reflex to Microscopic and Culture  - Urine Microscopic  - Urine Culture Aerobic Bacterial  - ibuprofen (ADVIL/MOTRIN) 400 MG tablet; Take 1 tablet (400 mg) by mouth every 6 hours as needed for pain  Dispense: 30 tablet; Refill: 1    2. Tension headache    Follow up on labs  Use medication as directed.  Repeat UPT in 2 weeks.  Follow up if symptoms should persist, change or worsen.  Patient amenable to this follow up plan.     Kaylan Abreu PA-C  [facility]

## 2019-01-31 NOTE — TELEPHONE ENCOUNTER
Spoke with pt.    Negative HPT today.  Pt states she doesn't feel well, sx for almost 3 weeks intermittently.    HA, N&V, fatigue.  Discussed seeing an IM provider as pt had neg preg test OB Gyn would not be appropriate for visit.    Pt scheduled with IM.    Lianet GOLDEN R.N.  [facility]

## 2019-01-31 NOTE — TELEPHONE ENCOUNTER
Reason for call:  Other   Patient called regarding (reason for call): Pt would like a sooner appointment with any provider before 2/6/19; please advise   Additional comments:     Phone number to reach patient:  Home number on file [phone] (home)    Best Time:  any    Can we leave a detailed message on this number?  Yes

## 2019-02-01 LAB
BACTERIA SPEC CULT: NORMAL
SPECIMEN SOURCE: NORMAL

## 2019-02-15 ENCOUNTER — HEALTH MAINTENANCE LETTER (OUTPATIENT)
Age: 24
End: 2019-02-15

## 2019-04-16 ENCOUNTER — OFFICE VISIT (OUTPATIENT)
Dept: DERMATOLOGY | Facility: CLINIC | Age: 24
End: 2019-04-16
Payer: COMMERCIAL

## 2019-04-16 VITALS — DIASTOLIC BLOOD PRESSURE: 68 MMHG | SYSTOLIC BLOOD PRESSURE: 100 MMHG | HEART RATE: 63 BPM | OXYGEN SATURATION: 99 %

## 2019-04-16 DIAGNOSIS — L81.0 POST-INFLAMMATORY HYPERPIGMENTATION: ICD-10-CM

## 2019-04-16 DIAGNOSIS — L70.0 ACNE VULGARIS: ICD-10-CM

## 2019-04-16 DIAGNOSIS — D48.5 NEOPLASM OF UNCERTAIN BEHAVIOR OF SKIN: Primary | ICD-10-CM

## 2019-04-16 PROCEDURE — 99203 OFFICE O/P NEW LOW 30 MIN: CPT | Mod: 25 | Performed by: PHYSICIAN ASSISTANT

## 2019-04-16 PROCEDURE — 88305 TISSUE EXAM BY PATHOLOGIST: CPT | Mod: TC | Performed by: PHYSICIAN ASSISTANT

## 2019-04-16 PROCEDURE — 11104 PUNCH BX SKIN SINGLE LESION: CPT | Performed by: PHYSICIAN ASSISTANT

## 2019-04-16 RX ORDER — TRETINOIN 0.25 MG/G
CREAM TOPICAL
Qty: 45 G | Refills: 11 | Status: SHIPPED | OUTPATIENT
Start: 2019-04-16 | End: 2019-11-12

## 2019-04-16 RX ORDER — HYDROQUINONE 40 MG/G
CREAM TOPICAL
Qty: 30 G | Refills: 5 | Status: SHIPPED | OUTPATIENT
Start: 2019-04-16 | End: 2020-04-30

## 2019-04-16 NOTE — PATIENT INSTRUCTIONS
Wound Care Instructions     FOR SUTURE CARE     [facility] [phone]                 AFTER 24 HOURS YOU SHOULD REMOVE THE BANDAGE AND BEGIN DAILY DRESSING CHANGES AS FOLLOWS:     1) Remove Dressing.     2) Clean and dry the area with tap water using a Q-tip or sterile gauze pad.     3) Apply Vaseline, Aquaphor, Polysporin ointment or Bacitracin ointment over entire wound.  Do NOT use Neosporin ointment.     4) Cover the wound with a band-aid, or a sterile non-stick gauze pad and micropore paper tape      REPEAT THESE INSTRUCTIONS AT LEAST ONCE A DAY UNTIL THE WOUND HAS COMPLETELY HEALED.    RETURN TO CLINIC FOR A NURSE-ONLY SUTURE REMOVAL APPOINTMENT IN 5 TO 7 DAYS. UNLESS YOUR PROVIDER SPECIFIES OTHERWISE.     It is an old wives tale that a wound heals better when it is exposed to air and allowed to dry out. The wound will heal faster with a better cosmetic result if it is kept moist with ointment and covered with a bandage.    **Do not let the wound dry out.**      Supplies Needed:      *Cotton tipped applicators (Q-tips)    *Vaseline, Aquaphor, Polysporin Ointment or Bacitracin Ointment (NOT NEOSPORIN)    *Band-aids or non-stick gauze pads and micropore paper tape.          PATIENT INFORMATION:  During the healing process you will notice a number of changes. All wounds develop a small halo of redness surrounding the wound.  This means healing is occurring. Severe itching with extensive redness usually indicates sensitivity to the ointment or bandage tape used to dress the wound.  You should call our office if this develops.      Swelling  and/or discoloration around your surgical site is common, particularly when performed around the eye.    After the wound is healed you may discontinue dressing changes.     You may experience a sensation of tightness as your wound heals. This is normal and will gradually subside.    Your healed wound may be sensitive to temperature changes. This sensitivity  improves with time, but if you re having a lot of discomfort, try to avoid temperature extremes.    Patients frequently experience itching after their wound appears to have healed because of the continue healing under the skin.  Plain Vaseline will help relieve the itching.      POSSIBLE COMPLICATIONS    BLEEDIN. Leave the bandage in place.  2. Use tightly rolled up gauze or a cloth to apply direct pressure over the bandage for 30  minutes.  3. Reapply pressure for an additional 30 minutes if necessary  4. Use additional gauze and tape to maintain pressure once the bleeding has stopped.

## 2019-04-16 NOTE — PROGRESS NOTES
HPI:   Chief complaints: Marielena Rodriguez is a 23 year old female who presents for evaluation of dark spot on face that has increased in size. Started out as a small dot and has grown over the past few years. Area is not painful; she definitely was not born with the spot.     Condition present for:  6-7 years.   Previous treatments include: lotion    Additional concern: Dark spots left over after her acne heals    No personal or fhx of skin CA    Review Of Systems  Eyes: negative  Ears/Nose/Throat: negative  Respiratory: No shortness of breath, dyspnea on exertion, cough, or hemoptysis  Cardiovascular: negative  Gastrointestinal: negative  Genitourinary: negative  Musculoskeletal: negative  Neurologic: negative  Psychiatric: negative    This document serves as a record of the services and decisions personally performed and made by Kaylee Candelaria, MS, PA-C. It was created on her behalf by Amarilis Emery, a trained medical scribe. The creation of this document is based on the provider's statements to the medical scribe.  Amarilis Emery 5:13 PM April 16, 2019    PHYSICAL EXAM:    /68   Pulse 63   LMP 04/15/2019   SpO2 99%   Breastfeeding? No   Skin exam performed as follows: Type 5 skin. Mood appropriate  Alert and Oriented X 3. Well developed, well nourished in no distress.  General appearance: Normal  Head including face: Normal  Eyes: conjunctiva and lids: Normal  Mouth: Lips, teeth, gums: Normal  Neck: Normal  Chest-breast/axillae: Normal  Back: Normal  Spleen and liver: Normal  Cardiovascular: Exam of peripheral vascular system by observation for swelling, varicosities, edema: Normal  Genitalia: groin, buttocks: Normal  Extremities: digits/nails (clubbing): Normal  Eccrine and Apocrine glands: Normal  Right upper extremity: Normal  Left upper extremity: Normal  Right lower extremity: Normal  Left lower extremity: Normal  Skin: Scalp and body hair: See below    1. 4 x 3 cm  hyperpigmented patch on right nasal side wall   2. 2+ PIH with 1+ comedones on the face    ASSESSMENT/PLAN:     1. Nevus r/o atypicality on right nasal side wall. Photo taken and placed in chart. After informed written consent was obtained, using Betadine for cleansing and 1% Lidocaine with epinephrine for anesthetic, with sterile technique a 3 mm punch biopsy was used to obtain a biopsy specimen of the lesion (portion of lesion tested). Hemostasis was obtained by pressure and wound was sutured with 6-0 nylon. Antibiotic dressing is applied, and wound care instructions provided. Be alert for any signs of cutaneous infection. The specimen is labeled and sent to pathology for evaluation. The procedure was well tolerated without complications. Suture removal in 5-7 days.     2. Acne Vulgaris with PIH- advised on diagnosis and treatment options. Discussed use of topical medications and antibiotics.   --Start tretinoin 0.025% cream every day  --Start HQ 4% cream BID x 4-6 months then d/c        Follow-up: pending path/2 months for acne  CC:   Scribed By: Amarilis Emery, Medical Scribe    The information in this document, created by the medical scribe for me, accurately reflects the services I personally performed and the decisions made by me. I have reviewed and approved this document for accuracy prior to leaving the patient care area.  April 16, 2019 5:24 PM    Kaylee Candelaria MS, PAMakaylaC

## 2019-04-16 NOTE — LETTER
4/16/2019         RE: Marielena Rodriguez  [address]  [facility] [address]        Dear Colleague,    Thank you for referring your patient, Marielena Rodriguez, to the [facility]. Please see a copy of my visit note below.    HPI:   Chief complaints: Marielena Rodriguez is a 23 year old female who presents for evaluation of dark spot on face that has increased in size. Started out as a small dot and has grown over the past few years. Area is not painful; she definitely was not born with the spot.     Condition present for:  6-7 years.   Previous treatments include: lotion    Additional concern: Dark spots left over after her acne heals    No personal or fhx of skin CA    Review Of Systems  Eyes: negative  Ears/Nose/Throat: negative  Respiratory: No shortness of breath, dyspnea on exertion, cough, or hemoptysis  Cardiovascular: negative  Gastrointestinal: negative  Genitourinary: negative  Musculoskeletal: negative  Neurologic: negative  Psychiatric: negative    This document serves as a record of the services and decisions personally performed and made by Kaylee Candelaria, MS, PA-C. It was created on her behalf by Amarilis Emery, a trained medical scribe. The creation of this document is based on the provider's statements to the medical scribe.  Amarilis Emery 5:13 PM April 16, 2019    PHYSICAL EXAM:    /68   Pulse 63   LMP 04/15/2019   SpO2 99%   Breastfeeding? No   Skin exam performed as follows: Type 5 skin. Mood appropriate  Alert and Oriented X 3. Well developed, well nourished in no distress.  General appearance: Normal  Head including face: Normal  Eyes: conjunctiva and lids: Normal  Mouth: Lips, teeth, gums: Normal  Neck: Normal  Chest-breast/axillae: Normal  Back: Normal  Spleen and liver: Normal  Cardiovascular: Exam of peripheral vascular system by observation for swelling, varicosities, edema: Normal  Genitalia: groin, buttocks:  Normal  Extremities: digits/nails (clubbing): Normal  Eccrine and Apocrine glands: Normal  Right upper extremity: Normal  Left upper extremity: Normal  Right lower extremity: Normal  Left lower extremity: Normal  Skin: Scalp and body hair: See below    1. 4 x 3 cm hyperpigmented patch on right nasal side wall   2. 2+ PIH with 1+ comedones on the face    ASSESSMENT/PLAN:     1. Nevus r/o atypicality on right nasal side wall. Photo taken and placed in chart. After informed written consent was obtained, using Betadine for cleansing and 1% Lidocaine with epinephrine for anesthetic, with sterile technique a 3 mm punch biopsy was used to obtain a biopsy specimen of the lesion (portion of lesion tested). Hemostasis was obtained by pressure and wound was sutured with 6-0 nylon. Antibiotic dressing is applied, and wound care instructions provided. Be alert for any signs of cutaneous infection. The specimen is labeled and sent to pathology for evaluation. The procedure was well tolerated without complications. Suture removal in 5-7 days.     2. Acne Vulgaris with PIH- advised on diagnosis and treatment options. Discussed use of topical medications and antibiotics.   --Start tretinoin 0.025% cream every day  --Start HQ 4% cream BID x 4-6 months then d/c        Follow-up: pending path/2 months for acne  CC:   Scribed By: Amarilis Emery Medical Scribe    The information in this document, created by the medical scribe for me, accurately reflects the services I personally performed and the decisions made by me. I have reviewed and approved this document for accuracy prior to leaving the patient care area.  April 16, 2019 5:24 PM    Kaylee Candelaria MS, PAMakaylaC        Again, thank you for allowing me to participate in the care of your patient.        Sincerely,        Kaylee Candelaria PA-C

## 2019-04-19 LAB — COPATH REPORT: NORMAL

## 2019-04-22 ENCOUNTER — TELEPHONE (OUTPATIENT)
Dept: DERMATOLOGY | Facility: CLINIC | Age: 24
End: 2019-04-22

## 2019-04-22 NOTE — TELEPHONE ENCOUNTER
Notes recorded by Kaylee Candelaria PA-C on 4/22/2019 at 9:44 AM CDT  Right NSW came back benign - not a mole but normal oil gland growth. For treatment, the hydroquinone I gave her should help. Try not to rub the area at all.

## 2019-04-22 NOTE — TELEPHONE ENCOUNTER
Called and LM for patient to call back in regards to biopsy results and providers notes.    Ana RN-BSN  [facility]  [phone]

## 2019-04-23 NOTE — TELEPHONE ENCOUNTER
Sent [employer] message- will check later to see if patient has read.    Karina ReneRN BSN  [facility]  [phone]

## 2019-10-21 ENCOUNTER — TELEPHONE (OUTPATIENT)
Dept: OBGYN | Facility: CLINIC | Age: 24
End: 2019-10-21

## 2019-10-21 NOTE — TELEPHONE ENCOUNTER
Positive pregnancy test and has some questions prior to first appointment .    LMP 9/8/19-  6w 1d    Informed her of fist appointment  around 8-10 weeks.      Spoke about getting PVN with [facility]    Avoiding cat feces, raw meats and unpasteurized dairy and juices.     Pt verbalized understanding, in agreement with plan, and voiced no further questions.       Transferred to scheduling.  Jaye Bravo RN on 10/21/2019 at 9:08 AM

## 2019-10-31 ENCOUNTER — TELEPHONE (OUTPATIENT)
Dept: OBGYN | Facility: CLINIC | Age: 24
End: 2019-10-31

## 2019-10-31 ENCOUNTER — HOSPITAL ENCOUNTER (EMERGENCY)
Facility: CLINIC | Age: 24
Discharge: HOME | End: 2019-10-31
Attending: EMERGENCY MEDICINE | Admitting: EMERGENCY MEDICINE
Payer: MEDICAID

## 2019-10-31 VITALS
SYSTOLIC BLOOD PRESSURE: 100 MMHG | RESPIRATION RATE: 16 BRPM | WEIGHT: 160 LBS | DIASTOLIC BLOOD PRESSURE: 72 MMHG | BODY MASS INDEX: 25.71 KG/M2 | OXYGEN SATURATION: 98 % | HEART RATE: 79 BPM | TEMPERATURE: 97.9 F | HEIGHT: 66 IN

## 2019-10-31 DIAGNOSIS — O21.0 HYPEREMESIS GRAVIDARUM: ICD-10-CM

## 2019-10-31 LAB
ANION GAP SERPL CALCULATED.3IONS-SCNC: 6 MMOL/L (ref 3–14)
B-HCG SERPL-ACNC: ABNORMAL IU/L (ref 0–5)
BASOPHILS # BLD AUTO: 0 10E9/L (ref 0–0.2)
BASOPHILS NFR BLD AUTO: 0.4 %
BUN SERPL-MCNC: 8 MG/DL (ref 7–30)
CALCIUM SERPL-MCNC: 8.1 MG/DL (ref 8.5–10.1)
CHLORIDE SERPL-SCNC: 109 MMOL/L (ref 94–109)
CO2 SERPL-SCNC: 23 MMOL/L (ref 20–32)
CREAT SERPL-MCNC: 0.53 MG/DL (ref 0.52–1.04)
DIFFERENTIAL METHOD BLD: ABNORMAL
EOSINOPHIL # BLD AUTO: 0.1 10E9/L (ref 0–0.7)
EOSINOPHIL NFR BLD AUTO: 0.8 %
ERYTHROCYTE [DISTWIDTH] IN BLOOD BY AUTOMATED COUNT: 13.1 % (ref 10–15)
GFR SERPL CREATININE-BSD FRML MDRD: >90 ML/MIN/{1.73_M2}
GLUCOSE SERPL-MCNC: 71 MG/DL (ref 70–99)
HCT VFR BLD AUTO: 37.6 % (ref 35–47)
HGB BLD-MCNC: 13.2 G/DL (ref 11.7–15.7)
IMM GRANULOCYTES # BLD: 0 10E9/L (ref 0–0.4)
IMM GRANULOCYTES NFR BLD: 0.3 %
LYMPHOCYTES # BLD AUTO: 1.8 10E9/L (ref 0.8–5.3)
LYMPHOCYTES NFR BLD AUTO: 22.1 %
MCH RBC QN AUTO: 28 PG (ref 26.5–33)
MCHC RBC AUTO-ENTMCNC: 35.1 G/DL (ref 31.5–36.5)
MCV RBC AUTO: 80 FL (ref 78–100)
MONOCYTES # BLD AUTO: 1.6 10E9/L (ref 0–1.3)
MONOCYTES NFR BLD AUTO: 19.8 %
NEUTROPHILS # BLD AUTO: 4.5 10E9/L (ref 1.6–8.3)
NEUTROPHILS NFR BLD AUTO: 56.6 %
NRBC # BLD AUTO: 0 10*3/UL
NRBC BLD AUTO-RTO: 0 /100
PLATELET # BLD AUTO: 277 10E9/L (ref 150–450)
POTASSIUM SERPL-SCNC: 3.3 MMOL/L (ref 3.4–5.3)
RBC # BLD AUTO: 4.71 10E12/L (ref 3.8–5.2)
SODIUM SERPL-SCNC: 138 MMOL/L (ref 133–144)
WBC # BLD AUTO: 8 10E9/L (ref 4–11)

## 2019-10-31 PROCEDURE — 25800030 ZZH RX IP 258 OP 636: Performed by: EMERGENCY MEDICINE

## 2019-10-31 PROCEDURE — 85025 COMPLETE CBC W/AUTO DIFF WBC: CPT | Performed by: EMERGENCY MEDICINE

## 2019-10-31 PROCEDURE — 36415 COLL VENOUS BLD VENIPUNCTURE: CPT | Performed by: EMERGENCY MEDICINE

## 2019-10-31 PROCEDURE — 25000128 H RX IP 250 OP 636: Performed by: EMERGENCY MEDICINE

## 2019-10-31 PROCEDURE — 84702 CHORIONIC GONADOTROPIN TEST: CPT | Performed by: EMERGENCY MEDICINE

## 2019-10-31 PROCEDURE — 99284 EMERGENCY DEPT VISIT MOD MDM: CPT | Mod: 25

## 2019-10-31 PROCEDURE — 96361 HYDRATE IV INFUSION ADD-ON: CPT

## 2019-10-31 PROCEDURE — 80048 BASIC METABOLIC PNL TOTAL CA: CPT | Performed by: EMERGENCY MEDICINE

## 2019-10-31 PROCEDURE — 96374 THER/PROPH/DIAG INJ IV PUSH: CPT

## 2019-10-31 RX ORDER — DOXYLAMINE SUCCINATE AND PYRIDOXINE HYDROCHLORIDE, DELAYED RELEASE TABLETS 10 MG/10 MG 10; 10 MG/1; MG/1
1 TABLET, DELAYED RELEASE ORAL EVERY 8 HOURS PRN
Qty: 21 TABLET | Refills: 0 | Status: SHIPPED | OUTPATIENT
Start: 2019-10-31 | End: 2020-04-30

## 2019-10-31 RX ORDER — METOCLOPRAMIDE HYDROCHLORIDE 5 MG/ML
10 INJECTION INTRAMUSCULAR; INTRAVENOUS ONCE
Status: COMPLETED | OUTPATIENT
Start: 2019-10-31 | End: 2019-10-31

## 2019-10-31 RX ORDER — SODIUM CHLORIDE 9 MG/ML
1000 INJECTION, SOLUTION INTRAVENOUS CONTINUOUS
Status: DISCONTINUED | OUTPATIENT
Start: 2019-10-31 | End: 2019-10-31 | Stop reason: HOSPADM

## 2019-10-31 RX ADMIN — METOCLOPRAMIDE 10 MG: 5 INJECTION, SOLUTION INTRAMUSCULAR; INTRAVENOUS at 12:54

## 2019-10-31 RX ADMIN — SODIUM CHLORIDE 1000 ML: 9 INJECTION, SOLUTION INTRAVENOUS at 12:52

## 2019-10-31 ASSESSMENT — ENCOUNTER SYMPTOMS
SHORTNESS OF BREATH: 0
NAUSEA: 1
VOMITING: 1
FEVER: 1
ABDOMINAL PAIN: 0
CONSTIPATION: 1
RHINORRHEA: 1

## 2019-10-31 ASSESSMENT — MIFFLIN-ST. JEOR: SCORE: 1502.51

## 2019-10-31 NOTE — TELEPHONE ENCOUNTER
Reason for call:  Other   Patient called regarding (reason for call): appointment  Additional comments: Patient is approximately 7 weeks pregnant and is suffering from nausea, loss of appetite and weight loss for the past 2 weeks. She is hoping to be seen at  OBGYN today if possible to address her symptoms. She has her first Prenatal visit scheduled at an outside facility, but is not scheduled until 11/12/19. She was advised to see an OB/gyn at [place] in the meantime. Please advise.     Phone number to reach patient:  Home number on file [phone] (home)    Best Time:  Asap    Can we leave a detailed message on this number?  YES

## 2019-10-31 NOTE — ED AVS SNAPSHOT
Emergency Department  [address] [facility] [address]  Phone:  [phone]  Fax:  [phone]                                    Marielena Rodriguez   MRN: [id number]    Department:   Emergency Department   Date of Visit:  10/31/2019           After Visit Summary Signature Page    I have received my discharge instructions, and my questions have been answered. I have discussed any challenges I see with this plan with the nurse or doctor.    ..........................................................................................................................................  Patient/Patient Representative Signature      ..........................................................................................................................................  Patient Representative Print Name and Relationship to Patient    ..................................................               ................................................  Date                                   Time    ..........................................................................................................................................  Reviewed by Signature/Title    ...................................................              ..............................................  Date                                               Time          22EPIC Rev 08/18

## 2019-10-31 NOTE — ED PROVIDER NOTES
"  History     Chief Complaint:  Nausea and Vomiting    The history is provided by the patient.      Marielena Rodriguez is a 24 year old female who presents with nausea and vomiting. The patient is around 7 weeks pregnant and has not establish an OB. Her last menstrual period was September 8th. She has not had any vaginal bleeding in the last couple days. The patient reports some nausea and vomiting for the last couple of weeks. She has not taken any medication for the nausea. She has had a runny nose and congestion. The patient denies any chest pain, shortness of breath, and abdominal cramping. The patient has been constipated.     Allergies:  No known drug allergies.    Medications:    The patient is not currently taking any prescribed medications.     Past Medical History:    Bilateral thoracic back pain    Past Surgical History:    History reviewed. No pertinent past surgical history.    Family History:    Diabetes    Social History:  Patient is single  Tobacco Use: No  Alcohol Use: No  PCP: Physician No Ref-Primary     Review of Systems   Constitutional: Positive for fever.   HENT: Positive for congestion and rhinorrhea.    Respiratory: Negative for shortness of breath.    Cardiovascular: Negative for chest pain.   Gastrointestinal: Positive for constipation, nausea and vomiting. Negative for abdominal pain.   Genitourinary: Negative for vaginal bleeding.   All other systems reviewed and are negative.    Physical Exam   First Vitals:  Patient Vitals for the past 24 hrs:   BP Temp Temp src Heart Rate Resp SpO2 Height Weight   10/31/19 1204 111/69 97.9  F (36.6  C) Temporal 95 16 100 % 1.676 m (5' 6\") 72.6 kg (160 lb)     Physical Exam  Physical Exam   General:  Sitting on bed.   HENT:  No obvious trauma to head  Right Ear:  External ear normal.   Left Ear:  External ear normal.   Nose:  Nose normal.   Eyes:  Conjunctivae and EOM are normal. Pupils are equal, round, and reactive.   Neck: Normal range of motion. Neck " supple. No tracheal deviation present.   CV:  Normal heart sounds. No murmur heard.  Pulm/Chest: Effort normal and breath sounds normal.   Abd: Soft. No distension. There is no tenderness. There is no rigidity, no rebound and no guarding.   M/S: Normal range of motion.   Neuro: Alert. GCS 15.  Skin: Skin is warm and dry. No rash noted. Not diaphoretic.   Psych: Normal mood and affect. Behavior is normal.     Emergency Department Course     Laboratory:  CBC:  WBC 8.0, HGB 13.2,   BMP: Potassium 3.3 low, Calcium 8.1 low, o/w WNL. (Creatinine 0.53)  HC,778 high    Interventions:  1252: NS 1L IV  1254: Reglan 10mg IV    Emergency Department Course:  12:15 PM Nursing notes and vitals reviewed.  I performed an exam of the patient as documented above.     3:20 PM Fetal heart tones found to be in the mid 140s.    3:26 PM Findings and plan explained to the patient. Patient discharged home with instructions regarding supportive care, medications, and reasons to return. The importance of close follow-up was reviewed.     Impression & Plan      Medical Decision Making:  Marielena Rodriguez is a very pleasant 24 year old female who presents for evaluation of nausea and vomiting.  She is currently pregnant.  Nonetheless. I considered a broad differential diagnosis for this patient including viral gastroenteritis, food poisoning, bowel obstruction, intra-abdominal infection such as colitis, cholecystitis, UTI, pyelonephritis, appendicitis, etc.  There are no signs of worrisome intra-abdominal pathologies detected during the visit today.  The patient has a completely benign abdominal exam without rebound, guarding, or marked tenderness to palpation.     Supportive outpatient management is therefore indicated.  Abdominal pain precautions are given for home.  I discussed her potassium is slightly low.  She will increase amount of bananas, orange juice and spinach in her diet.  It was discussed with the patient to return to  the ED for blood in stool, increasing pain, or fevers more than 102.   Feels much improved after interventions in ED.  See above for medications for home.  I believe all of her symptoms are at this point explained by the pregnancy and hyperemesis gravidarum.      The treatment plan was discussed with the patient and they expressed understanding of this plan and consented to the plan.  In addition, the patient will return to the emergency department if their symptoms persist, worsen, if new symptoms arise or if there is any concern as other pathology may be present that is not evident at this time. They also understand the importance of close follow up in the clinic and if unable to do so will return to the emergency department for a reevaluation. All questions were answered.    Diagnosis:    ICD-10-CM    1. Hyperemesis gravidarum O21.0        Disposition:  discharged to home    Discharge Medications:  New Prescriptions    DOXYLAMINE-PYRIDOXINE 10-10 MG TBEC    Take 1 tablet by mouth every 8 hours as needed (nausea)     I, Bradley Aasen, am serving as a scribe on 10/31/2019 at 12:15 PM to personally document services performed by Silverio Garcia DO based on my observations and the provider's statements to me.          Silverio Garcia DO  10/31/19 1528

## 2019-11-05 ENCOUNTER — OFFICE VISIT (OUTPATIENT)
Dept: OBGYN | Facility: CLINIC | Age: 24
End: 2019-11-05
Payer: MEDICAID

## 2019-11-05 VITALS — BODY MASS INDEX: 24.55 KG/M2 | SYSTOLIC BLOOD PRESSURE: 108 MMHG | DIASTOLIC BLOOD PRESSURE: 74 MMHG | WEIGHT: 152.1 LBS

## 2019-11-05 DIAGNOSIS — O21.9 NAUSEA AND VOMITING OF PREGNANCY, ANTEPARTUM: Primary | ICD-10-CM

## 2019-11-05 PROCEDURE — 99213 OFFICE O/P EST LOW 20 MIN: CPT | Performed by: ADVANCED PRACTICE MIDWIFE

## 2019-11-05 RX ORDER — METOCLOPRAMIDE 5 MG/1
10 TABLET ORAL 4 TIMES DAILY PRN
Qty: 60 TABLET | Refills: 1 | Status: SHIPPED | OUTPATIENT
Start: 2019-11-05 | End: 2020-04-30

## 2019-11-05 RX ORDER — PYRIDOXINE HCL (VITAMIN B6) 25 MG
25 TABLET ORAL 3 TIMES DAILY
Qty: 90 TABLET | Refills: 3 | Status: SHIPPED | OUTPATIENT
Start: 2019-11-05 | End: 2020-06-04

## 2019-11-05 NOTE — NURSING NOTE
"Chief Complaint   Patient presents with     Follow Up     ED nausea and vomiting   Pt is pregnant       Initial /74 (BP Location: Right arm, Patient Position: Chair, Cuff Size: Adult Regular)   Wt 69 kg (152 lb 1.6 oz)   LMP 2019 (Exact Date)   Breastfeeding? No   BMI 24.55 kg/m   Estimated body mass index is 24.55 kg/m  as calculated from the following:    Height as of 10/31/19: 1.676 m (5' 6\").    Weight as of this encounter: 69 kg (152 lb 1.6 oz).  BP completed using cuff size: regular    Questioned patient about current smoking habits.  Pt. has never smoked.          The following HM Due: NONE      Karen Browning CMA on 2019 at 1:38 PM         "

## 2019-11-05 NOTE — PATIENT INSTRUCTIONS
Remedies for nausea and vomiting with pregnancy      Eat small frequent meals every 2-3 hours if possible.       Avoid food at extremes of temperature and drinks with carbonation.      Eat foods that appeal to you, avoiding fats and spicy foods.      Avoid liquids with foods.  Drink liquids 30-60 minutes before or after eating and sip slowly.      Bread, pasta, crackers, potatoes, and rice tend to be tolerated the best.      Don't worry about what you eat in the first 3 months, it is more important that you can eat and keep it down.       Try flat ginger ale or ginger tea      Before rising in the morning, eat a small amount of crackers or dry toast.      Peppermint tea      Ginger is a herbal remedy for nausea and you can use it in any form.  There are ginger tablets you can purchase.  The dose 1000 mg a day in divided doses.       You may also try doxylamine (Unisom) 12.5 mg three times a day which is a sleeping medication along with Vitamin B6 25 mg three times a day.  This combination takes up to a week to work so give it some time.       Benadryl (diphenhydramine) 25-50 mg every 8 hour or Dramamine (dimenhydrinate)  mg by mouth every 4-6 hours. Both of these medication may cause some drowsiness      Other things that may help include an Accupressure band and acupuncture      If these methods fail there are many prescriptions that we can try    If you begin to vomit more than 5 or 6 times a day and feel that you are unable to keep anything down, call the [facility] [phone]

## 2019-11-05 NOTE — PROGRESS NOTES
SUBJECTIVE:                                                   Marielena Rodriguez is a 24 year old who presents to clinic today for the following health issue(s):  Patient presents with:  Follow Up: ED nausea and vomiting   Pt is pregnant  Marielena is 8w2d by LMP of 9/8/19. She has not yet established prenatal care. COSME is 6/14/2020. She was seen in ED for nausea/vomiting. She received IV fluids and Reglan which helped. She was sent home with an prescription for Diclegis which she was not able to get as pharmacy does not carry it. She reports she is vomiting about 3 times per day and feeling constantly nauseous. She has lost about 2 pounds since before pregnancy. Today she is able to keep down some ice and popsicles, so she is feeling better.     Problem list and histories reviewed & adjusted, as indicated.  Additional history: as documented.    Patient Active Problem List   Diagnosis     Bilateral thoracic back pain     Past Surgical History:   Procedure Laterality Date     NO HISTORY OF SURGERY  12/10/2018      Social History     Tobacco Use     Smoking status: Never Smoker     Smokeless tobacco: Never Used   Substance Use Topics     Alcohol use: No      Problem (# of Occurrences) Relation (Name,Age of Onset)    Diabetes (1) Maternal Grandmother    No Known Problems (2) Mother, Father            Current Outpatient Medications   Medication Sig     doxylamine (UNISOM) 25 MG TABS tablet Take 1 tablet (25 mg) by mouth At Bedtime     metoclopramide (REGLAN) 5 MG tablet Take 2 tablets (10 mg) by mouth 4 times daily as needed (nausea/vomiting)     vitamin B6 (PYRIDOXINE) 25 MG tablet Take 1 tablet (25 mg) by mouth 3 times daily     Doxylamine-Pyridoxine 10-10 MG TBEC Take 1 tablet by mouth every 8 hours as needed (nausea) (Patient not taking: Reported on 11/5/2019)     hydroquinone (TAMIR) 4 % external cream Apply to AA BID x 4-6 months then stop (Patient not taking: Reported on 11/5/2019)     ibuprofen (ADVIL/MOTRIN)  400 MG tablet Take 1 tablet (400 mg) by mouth every 6 hours as needed for pain (Patient not taking: Reported on 11/5/2019)     tretinoin (RETIN-A) 0.025 % external cream Apply a pea sized amount to entire face QD (Patient not taking: Reported on 11/5/2019)     No current facility-administered medications for this visit.      No Known Allergies    ROS:  12 point review of systems negative other than symptoms noted below.    OBJECTIVE:     /74 (BP Location: Right arm, Patient Position: Chair, Cuff Size: Adult Regular)   Wt 69 kg (152 lb 1.6 oz)   LMP 09/08/2019 (Exact Date)   Breastfeeding? No   BMI 24.55 kg/m    Body mass index is 24.55 kg/m .    PHYSICAL EXAM:  Constitutional:  Appearance: Well nourished, well developed alert, in no acute distress  Chest:  Respiratory Effort:  Breathing unlabored. Clear to auscultation bilaterally.   Neurologic:  Mental Status:  Oriented X3.  Normal strength and tone   Psychiatric:  Mentation appears normal and affect normal/bright.       ASSESSMENT/PLAN:                                                        ICD-10-CM    1. Nausea and vomiting of pregnancy, antepartum O21.9 metoclopramide (REGLAN) 5 MG tablet     vitamin B6 (PYRIDOXINE) 25 MG tablet     doxylamine (UNISOM) 25 MG TABS tablet     Discussed that she can take vitamin B6 & unisom in place of Diclegis  Prescription for Reglan sent to her pharmacy  Follow up in 2-3 days if symptoms don't improve  Reviewed comfort measures in detail and handout given, see AVS    Follow up for nurse visit and new prenatal visit. Reviewed prenatal care at [facility].       Candis Evans CNM, DANIEL-BC

## 2019-11-12 ENCOUNTER — TELEPHONE (OUTPATIENT)
Dept: MIDWIFE SERVICES | Facility: CLINIC | Age: 24
End: 2019-11-12

## 2019-11-12 ENCOUNTER — PRENATAL OFFICE VISIT (OUTPATIENT)
Dept: OBGYN | Facility: CLINIC | Age: 24
End: 2019-11-12
Payer: MEDICAID

## 2019-11-12 VITALS — SYSTOLIC BLOOD PRESSURE: 94 MMHG | WEIGHT: 150 LBS | BODY MASS INDEX: 24.21 KG/M2 | DIASTOLIC BLOOD PRESSURE: 70 MMHG

## 2019-11-12 DIAGNOSIS — O21.9 NAUSEA AND VOMITING IN PREGNANCY PRIOR TO 22 WEEKS GESTATION: ICD-10-CM

## 2019-11-12 DIAGNOSIS — Z34.00 ENCOUNTER FOR SUPERVISION OF NORMAL FIRST PREGNANCY, UNSPECIFIED TRIMESTER: Primary | ICD-10-CM

## 2019-11-12 LAB
ABO + RH BLD: NORMAL
ABO + RH BLD: NORMAL
ALBUMIN UR-MCNC: 30 MG/DL
APPEARANCE UR: ABNORMAL
BACTERIA #/AREA URNS HPF: ABNORMAL /HPF
BILIRUB UR QL STRIP: NEGATIVE
BLD GP AB SCN SERPL QL: NORMAL
BLOOD BANK CMNT PATIENT-IMP: NORMAL
COLOR UR AUTO: YELLOW
ERYTHROCYTE [DISTWIDTH] IN BLOOD BY AUTOMATED COUNT: 12.7 % (ref 10–15)
GLUCOSE UR STRIP-MCNC: 300 MG/DL
HBV SURFACE AG SERPL QL IA: NONREACTIVE
HCT VFR BLD AUTO: 40.9 % (ref 35–47)
HGB BLD-MCNC: 13.9 G/DL (ref 11.7–15.7)
HGB UR QL STRIP: NEGATIVE
HIV 1+2 AB+HIV1 P24 AG SERPL QL IA: NONREACTIVE
KETONES UR STRIP-MCNC: 5 MG/DL
LEUKOCYTE ESTERASE UR QL STRIP: ABNORMAL
MCH RBC QN AUTO: 27.4 PG (ref 26.5–33)
MCHC RBC AUTO-ENTMCNC: 34 G/DL (ref 31.5–36.5)
MCV RBC AUTO: 81 FL (ref 78–100)
MUCOUS THREADS #/AREA URNS LPF: PRESENT /LPF
NITRATE UR QL: NEGATIVE
PH UR STRIP: 6 PH (ref 5–7)
PLATELET # BLD AUTO: 381 10E9/L (ref 150–450)
RBC # BLD AUTO: 5.07 10E12/L (ref 3.8–5.2)
RBC #/AREA URNS AUTO: 7 /HPF (ref 0–2)
RUBV IGG SERPL IA-ACNC: 67 IU/ML
SOURCE: ABNORMAL
SP GR UR STRIP: 1.03 (ref 1–1.03)
SPECIMEN EXP DATE BLD: NORMAL
SQUAMOUS #/AREA URNS AUTO: 26 /HPF (ref 0–1)
T PALLIDUM AB SER QL: NONREACTIVE
UROBILINOGEN UR STRIP-MCNC: 4 MG/DL (ref 0–2)
WBC # BLD AUTO: 8.8 10E9/L (ref 4–11)
WBC #/AREA URNS AUTO: 7 /HPF (ref 0–5)

## 2019-11-12 PROCEDURE — 87340 HEPATITIS B SURFACE AG IA: CPT | Performed by: ADVANCED PRACTICE MIDWIFE

## 2019-11-12 PROCEDURE — 36415 COLL VENOUS BLD VENIPUNCTURE: CPT | Performed by: ADVANCED PRACTICE MIDWIFE

## 2019-11-12 PROCEDURE — 87389 HIV-1 AG W/HIV-1&-2 AB AG IA: CPT | Performed by: ADVANCED PRACTICE MIDWIFE

## 2019-11-12 PROCEDURE — 86780 TREPONEMA PALLIDUM: CPT | Performed by: ADVANCED PRACTICE MIDWIFE

## 2019-11-12 PROCEDURE — 86762 RUBELLA ANTIBODY: CPT | Performed by: ADVANCED PRACTICE MIDWIFE

## 2019-11-12 PROCEDURE — 85027 COMPLETE CBC AUTOMATED: CPT | Performed by: ADVANCED PRACTICE MIDWIFE

## 2019-11-12 PROCEDURE — 87591 N.GONORRHOEAE DNA AMP PROB: CPT | Performed by: ADVANCED PRACTICE MIDWIFE

## 2019-11-12 PROCEDURE — 87086 URINE CULTURE/COLONY COUNT: CPT | Performed by: ADVANCED PRACTICE MIDWIFE

## 2019-11-12 PROCEDURE — 86900 BLOOD TYPING SEROLOGIC ABO: CPT | Performed by: ADVANCED PRACTICE MIDWIFE

## 2019-11-12 PROCEDURE — 86901 BLOOD TYPING SEROLOGIC RH(D): CPT | Performed by: ADVANCED PRACTICE MIDWIFE

## 2019-11-12 PROCEDURE — 99214 OFFICE O/P EST MOD 30 MIN: CPT | Performed by: ADVANCED PRACTICE MIDWIFE

## 2019-11-12 PROCEDURE — 87491 CHLMYD TRACH DNA AMP PROBE: CPT | Performed by: ADVANCED PRACTICE MIDWIFE

## 2019-11-12 PROCEDURE — 81001 URINALYSIS AUTO W/SCOPE: CPT | Performed by: ADVANCED PRACTICE MIDWIFE

## 2019-11-12 PROCEDURE — 86850 RBC ANTIBODY SCREEN: CPT | Performed by: ADVANCED PRACTICE MIDWIFE

## 2019-11-12 RX ORDER — ONDANSETRON 2 MG/ML
4 INJECTION INTRAMUSCULAR; INTRAVENOUS ONCE
Status: CANCELLED | OUTPATIENT
Start: 2019-11-12

## 2019-11-12 RX ORDER — DEXTROSE, SODIUM CHLORIDE, SODIUM LACTATE, POTASSIUM CHLORIDE, AND CALCIUM CHLORIDE 5; .6; .31; .03; .02 G/100ML; G/100ML; G/100ML; G/100ML; G/100ML
1000 INJECTION, SOLUTION INTRAVENOUS ONCE
Status: CANCELLED | OUTPATIENT
Start: 2019-11-12

## 2019-11-12 NOTE — Clinical Note
Can you please call patient and give her the phone number to schedule IV hydration visits? The therapy plan is ordered. ThanksMelida

## 2019-11-12 NOTE — TELEPHONE ENCOUNTER
Called and left message to call clinic back.   Per message from :  Melida Burroughs APRN CNM  P Rd Triage Pod B             Can you please call patient and give her the phone number to schedule IV hydration visits? The therapy plan is ordered. Melida Regan      Phone number for infusion clinic is: [phone].    Delores Piña,

## 2019-11-12 NOTE — PROGRESS NOTES
Pt here with  at [facility] clinic. First pregnancy and she has been very sick with nausea/vomiting. Has lost 10lbs, 3+ ketones in urine today and urine very dark. She was seen in ED and received IV fluids and reports she felt better for a day or two after. Discussed and ordered IV hydration in clinic, pt is interested in trying this but thinks she may only be able to make it once weekly due to work schedule. They were originally set up for care with [facility] but would like to birth at [facility]. US ordered for dating as this was recommended in ED. Unable to hear FHTs today but <10 weeks EGA. Reviewed CNM service, schedule of visits, call rotation. RTC at [facility] for dating ultrasound and then to [facility] in 4 weeks for next CNM visit. Will have triage RN call patient with information to schedule IV hydration appointments. If not improving she should be seen sooner than 4 weeks. MML    9w2d   Marielena Rodriguez is a 24 year old who presents to the clinic for an new ob visit. She is not a previous CNM patient.  Estimated Date of Delivery: Jun 14, 2020 is calculated from Patient's last menstrual period was 09/08/2019 (exact date).     She has not had bleeding since her LMP.   She has had nausea resulting in weight loss has occurred, for a total of 10 pounds.   This was a planned pregnancy.   FOABRAM is involved,  Foster   OTHER CONCERNS:     INFECTION HISTORY  HIV: no  Hepatitis B: no  Hepatitis C: no  Syphilis:  no  Tuberculosis: no   PPD- no  Herpes self: no  Herpes partner:  no  Chlamydia:  no  Gonorrhea:  no  HPV: no  BV:  no  Trichomonis:  no  Chicken Pox:  YES  ====================================================  GENETIC SCREENING  Genetic screening reviewed. High Risk? na  ====================================================  PERSONAL/SOCIAL HISTORY  Lives lives with their spouse.  Employment: Full time.  Her job involves - works in health care .  HX OF ABUSE:  no  =====================================================   REVIEW OF SYSTEMS  CONSTITUTIONAL: NEGATIVE for fever, chills  EYES: NEGATIVE for vision changes   RESP: NEGATIVE for significant cough or SOB  CV: NEGATIVE for chest pain, palpitations   GI: NEGATIVE for nausea, abdominal pain, heartburn, or change in bowel habits  : NEGATIVE for frequency, dysuria, or hematuria  MUSCULOSKELETAL: NEGATIVE for significant arthralgias or myalgia  NEURO: NEGATIVE for weakness, dizziness or paresthesias or headache  ====================================================    PHYSICAL EXAM:  BP 94/70   Wt 68 kg (150 lb)   LMP 09/08/2019 (Exact Date)   BMI 24.21 kg/m    BMI- Body mass index is 24.21 kg/m .,     RECOMMENDED WEIGHT GAIN: 25-35 lbs.    GENERAL:  Pleasant pregnant female, alert, well groomed.   SKIN:  Warm and dry, without lesions or rashes  HEAD: Symmetrical features.  NECK:  Thyroid without enlargement and nodules.  Lymph nodes not palpable.  LUNGS:  Clear to auscultation.  HEART:  RRR without murmur.  ABDOMEN: Soft without masses , tenderness or organomegaly.  No CVA tenderness. No scars noted.   FHT - not heard today  MUSCULOSKELETAL:  Full range of motion  EXTREMITIES:  No edema. No significant varicosities.   =========================================  ASSESSMENT:  9w2d,   (Z34.00) Encounter for supervision of normal first pregnancy, unspecified trimester  (primary encounter diagnosis)  Comment:   Plan: US OB < 14 Weeks Single, CBC with platelets, UA        without Microscopic, Urine Culture Aerobic         Bacterial, ABO/Rh type and screen, HIV Antigen         Antibody Combo, Rubella Antibody IgG         Quantitative, Hepatitis B surface antigen,         Neisseria gonorrhoeae PCR, Chlamydia         trachomatis PCR, Treponema Abs w Reflex to RPR         and Titer            (O21.9) Nausea and vomiting in pregnancy prior to 22 weeks gestation  Comment:   Plan:    ==========================================  PLAN:  Instructed on use of triage nurse line and contacting the on call CNM after hours for an urgent need such as fever, vagina bleeding, bladder or vaginal infection, rupture of membranes,  or term labor.    Discussed the indications, uses for and false positives for quad screen, nuchal translucency and fetal survey ultrasound at 18-20 weeks gestation. Will inform us at the next visit if she wished to avail herself of these screens.  Instructed on best evidence for: weight gain for her BMI for pregnancy; healthy diet and foods to avoid; exercise and activity during pregnancy;avoiding exposure to toxoplasmosis; and maintenance of a generally healthy lifestyle.   Discussed the harms, benefits, side effects and alternative therapies for current prescribed and OTC medications.  ELIZABETH Armas CNM

## 2019-11-13 ENCOUNTER — ANCILLARY PROCEDURE (OUTPATIENT)
Dept: ULTRASOUND IMAGING | Facility: CLINIC | Age: 24
End: 2019-11-13
Attending: ADVANCED PRACTICE MIDWIFE
Payer: MEDICAID

## 2019-11-13 DIAGNOSIS — Z34.00 ENCOUNTER FOR SUPERVISION OF NORMAL FIRST PREGNANCY, UNSPECIFIED TRIMESTER: ICD-10-CM

## 2019-11-13 LAB
BACTERIA SPEC CULT: NORMAL
C TRACH DNA SPEC QL NAA+PROBE: NEGATIVE
Lab: NORMAL
N GONORRHOEA DNA SPEC QL NAA+PROBE: NEGATIVE
SPECIMEN SOURCE: NORMAL

## 2019-11-13 PROCEDURE — 76815 OB US LIMITED FETUS(S): CPT | Performed by: OBSTETRICS & GYNECOLOGY

## 2019-11-18 ENCOUNTER — HOSPITAL ENCOUNTER (EMERGENCY)
Facility: CLINIC | Age: 24
Discharge: HOME | End: 2019-11-18
Attending: EMERGENCY MEDICINE | Admitting: EMERGENCY MEDICINE
Payer: MEDICAID

## 2019-11-18 VITALS
HEART RATE: 98 BPM | OXYGEN SATURATION: 100 % | HEIGHT: 66 IN | SYSTOLIC BLOOD PRESSURE: 119 MMHG | RESPIRATION RATE: 18 BRPM | DIASTOLIC BLOOD PRESSURE: 90 MMHG | BODY MASS INDEX: 22.5 KG/M2 | WEIGHT: 140 LBS | TEMPERATURE: 98.1 F

## 2019-11-18 DIAGNOSIS — O21.0 HYPEREMESIS GRAVIDARUM: ICD-10-CM

## 2019-11-18 LAB
ANION GAP SERPL CALCULATED.3IONS-SCNC: 11 MMOL/L (ref 3–14)
BUN SERPL-MCNC: 15 MG/DL (ref 7–30)
CALCIUM SERPL-MCNC: 10.5 MG/DL (ref 8.5–10.1)
CHLORIDE SERPL-SCNC: 100 MMOL/L (ref 94–109)
CO2 SERPL-SCNC: 23 MMOL/L (ref 20–32)
CREAT SERPL-MCNC: 0.51 MG/DL (ref 0.52–1.04)
D DIMER PPP FEU-MCNC: 0.5 UG/ML FEU (ref 0–0.5)
DIFFERENTIAL METHOD BLD: NORMAL
ERYTHROCYTE [DISTWIDTH] IN BLOOD BY AUTOMATED COUNT: NORMAL % (ref 10–15)
GFR SERPL CREATININE-BSD FRML MDRD: >90 ML/MIN/{1.73_M2}
GLUCOSE SERPL-MCNC: 90 MG/DL (ref 70–99)
HCT VFR BLD AUTO: NORMAL % (ref 35–47)
HGB BLD-MCNC: NORMAL G/DL (ref 11.7–15.7)
INTERPRETATION ECG - MUSE: NORMAL
MCH RBC QN AUTO: NORMAL PG (ref 26.5–33)
MCHC RBC AUTO-ENTMCNC: NORMAL G/DL (ref 31.5–36.5)
MCV RBC AUTO: NORMAL FL (ref 78–100)
PLATELET # BLD AUTO: NORMAL 10E9/L (ref 150–450)
POTASSIUM SERPL-SCNC: 3.1 MMOL/L (ref 3.4–5.3)
RBC # BLD AUTO: NORMAL 10E12/L (ref 3.8–5.2)
SODIUM SERPL-SCNC: 134 MMOL/L (ref 133–144)
WBC # BLD AUTO: NORMAL 10E9/L (ref 4–11)

## 2019-11-18 PROCEDURE — 80048 BASIC METABOLIC PNL TOTAL CA: CPT | Performed by: EMERGENCY MEDICINE

## 2019-11-18 PROCEDURE — 93005 ELECTROCARDIOGRAM TRACING: CPT

## 2019-11-18 PROCEDURE — 25000132 ZZH RX MED GY IP 250 OP 250 PS 637: Performed by: EMERGENCY MEDICINE

## 2019-11-18 PROCEDURE — 85379 FIBRIN DEGRADATION QUANT: CPT | Performed by: EMERGENCY MEDICINE

## 2019-11-18 PROCEDURE — 96374 THER/PROPH/DIAG INJ IV PUSH: CPT

## 2019-11-18 PROCEDURE — 25800030 ZZH RX IP 258 OP 636: Performed by: EMERGENCY MEDICINE

## 2019-11-18 PROCEDURE — 25000128 H RX IP 250 OP 636: Performed by: EMERGENCY MEDICINE

## 2019-11-18 PROCEDURE — 25000125 ZZHC RX 250: Performed by: EMERGENCY MEDICINE

## 2019-11-18 PROCEDURE — 96361 HYDRATE IV INFUSION ADD-ON: CPT

## 2019-11-18 PROCEDURE — 99284 EMERGENCY DEPT VISIT MOD MDM: CPT | Mod: 25

## 2019-11-18 PROCEDURE — 85025 COMPLETE CBC W/AUTO DIFF WBC: CPT | Performed by: EMERGENCY MEDICINE

## 2019-11-18 RX ORDER — SODIUM CHLORIDE 9 MG/ML
INJECTION, SOLUTION INTRAVENOUS CONTINUOUS
Status: DISCONTINUED | OUTPATIENT
Start: 2019-11-18 | End: 2019-11-18 | Stop reason: HOSPADM

## 2019-11-18 RX ORDER — METOCLOPRAMIDE HYDROCHLORIDE 5 MG/ML
5 INJECTION INTRAMUSCULAR; INTRAVENOUS EVERY 6 HOURS
Status: DISCONTINUED | OUTPATIENT
Start: 2019-11-18 | End: 2019-11-18

## 2019-11-18 RX ORDER — METOCLOPRAMIDE HYDROCHLORIDE 5 MG/ML
5 INJECTION INTRAMUSCULAR; INTRAVENOUS ONCE
Status: COMPLETED | OUTPATIENT
Start: 2019-11-18 | End: 2019-11-18

## 2019-11-18 RX ADMIN — METOCLOPRAMIDE 5 MG: 5 INJECTION, SOLUTION INTRAMUSCULAR; INTRAVENOUS at 20:29

## 2019-11-18 RX ADMIN — SODIUM CHLORIDE 1000 ML: 9 INJECTION, SOLUTION INTRAVENOUS at 19:24

## 2019-11-18 RX ADMIN — LIDOCAINE HYDROCHLORIDE 30 ML: 20 SOLUTION ORAL; TOPICAL at 18:11

## 2019-11-18 ASSESSMENT — ENCOUNTER SYMPTOMS
SHORTNESS OF BREATH: 1
UNEXPECTED WEIGHT CHANGE: 1
VOMITING: 1
NAUSEA: 1
APPETITE CHANGE: 1

## 2019-11-18 ASSESSMENT — MIFFLIN-ST. JEOR: SCORE: 1411.79

## 2019-11-18 NOTE — ED TRIAGE NOTES
SOB since yesterday, sudden onset,10 weeks pregnant, no recent travel. No cough, no ever, denies chest pain. Has been vomiting a lot recently

## 2019-11-18 NOTE — ED AVS SNAPSHOT
Emergency Department  [address] [facility] [address]  Phone:  [phone]  Fax:  [phone]                                    Marielena Rodriguez   MRN: [id number]    Department:   Emergency Department   Date of Visit:  11/18/2019           After Visit Summary Signature Page    I have received my discharge instructions, and my questions have been answered. I have discussed any challenges I see with this plan with the nurse or doctor.    ..........................................................................................................................................  Patient/Patient Representative Signature      ..........................................................................................................................................  Patient Representative Print Name and Relationship to Patient    ..................................................               ................................................  Date                                   Time    ..........................................................................................................................................  Reviewed by Signature/Title    ...................................................              ..............................................  Date                                               Time          22EPIC Rev 08/18

## 2019-11-19 NOTE — ED PROVIDER NOTES
"  History     Chief Complaint:  Shortness of Breath      HPI   Marielena Rodriguez is a 24 year old pregnant (G 1 P 0) female who presents to the emergency department for evaluation of shortness of breath. The patient notes that she has been experiencing chest pain for about a week, and has been experience shortness of breath since 11/17. The patient reports that she has seen her OB and they prescribed her two medications for nausea. The patient notes that she has been recently losing weight as well. The patient had a cold a few weeks ago that has subsided. She has not traveled recently, however. The patient reports nausea, vomiting, appetite change, weight change, chest pain, and shortness of breath. The patient denies a history of difficulty breathing, leg pain, or leg swelling.    Allergies:  No known drug allergies    Medications:    Unisom  [place]  Reglan  Pyridoxine    Past Medical History:    Bilateral thoracic back pain    Past Surgical History:    The patient does not have any pertinent past surgical history.    Family History:    No past pertinent family history.    Social History:  The patient reports with her boyfriend.  Never smoker  Negative for alcohol use.  Negative for drug use.  Marital Status:  Single    Review of Systems   Constitutional: Positive for appetite change and unexpected weight change.   Respiratory: Positive for shortness of breath.    Cardiovascular: Positive for chest pain.   Gastrointestinal: Positive for nausea and vomiting.   Musculoskeletal:        Denies leg pain  Denies leg swelling   All other systems reviewed and are negative.        Physical Exam     Patient Vitals for the past 24 hrs:   BP Temp Temp src Pulse Heart Rate Resp SpO2 Height Weight   11/18/19 1900 (!) 119/90 -- -- 98 -- -- 100 % -- --   11/18/19 1825 -- -- -- -- -- -- 100 % -- --   11/18/19 1651 129/73 98.1  F (36.7  C) Temporal 133 133 18 98 % 1.676 m (5' 6\") 63.5 kg (140 lb)     Physical Exam  GENERAL: well " developed, pleasant  HEAD: atraumatic  EYES: pupils reactive, extraocular muscles intact, conjunctivae normal  ENT:  mucus membranes moist  NECK:  trachea midline, normal range of motion  RESPIRATORY: no tachypnea, mildly diminished lung sounds  CVS: normal S1/S2, no murmurs, intact distal pulses, tachycardic  ABDOMEN: soft, nontender, nondistention  MUSCULOSKELETAL: no deformities  SKIN: warm and dry, no acute rashes or ulceration  NEURO: GCS 15, cranial nerves intact, alert and oriented x3  PSYCH:  Mood/affect normal    Emergency Department Course     ECG:  Time: 1653  Vent. Rate 120 bpm. FL interval 120. QRS duration 60. QT/QTc 302/426. P-R-T axis 79 51 36.  Sinus tachycardia  Bilateral enlargement  Nonspecific ST abnormality  Abnormal ECG  No previous ECG's available    Laboratory:  Laboratory findings were communicated with the patient who voiced understanding of the findings.    BMP: Potassium 3.1 (L), Creatinine 0.51 (L), Calcium 10.5 (H) o/w WNL    1814 D dimer quantitative 0.5     Interventions:  1811 Xylocaine 30 mL PO    1924 NS 1L IV    2029 Reglan 5 mg IV    Emergency Department Course:    1653 EKG obtained as noted above.    1800 Nursing notes and vitals reviewed.    1807 I performed an exam of the patient as documented above.     1814 IV was inserted and blood was drawn for laboratory testing, results above.    1918 Patient rechecked and updated.     Findings and plan explained to the Patient. Patient discharged home with instructions regarding supportive care, medications, and reasons to return. The importance of close follow-up was reviewed. The patient was prescribed Tagamet.    Impression & Plan     Medical Decision Making:  Marielena Rodriguez is a 24 year old female who presents to the emergency department today with vomiting in the setting of early pregnancy as well as shortness of breath.  Patient been struggling keeping anything down.  Reglan has been written for but waiting for insurance  approval or  approval.  Patient given GI cocktail with improvement of her symptoms in terms of the shortness of breath feels more GI related as opposed to pulmonary.  D-dimer is normal.  Do not think that this is PE.  Patient given IV fluids x2 L and nausea treatment.  She is feeling improved.  Discussed outpatient management with her.    Discharge Diagnosis:    ICD-10-CM    1. Hyperemesis gravidarum O21.0        Disposition:  The patient is discharged to home.     Discharge Medications:  Discharge Medication List as of 11/18/2019  8:06 PM      START taking these medications    Details   cimetidine (TAGAMET) 200 MG tablet Take 1 tablet (200 mg) by mouth 2 times daily, Disp-28 tablet, R-0, Local Print             Scribe Disclosure:  I, Catracho Ybarra, am serving as a scribe at 11:31 PM on 11/18/2019 to document services personally performed by Emiliano Callaway MD based on my observations and the provider's statements to me.        Emiliano Callaway MD  11/19/19 0019

## 2019-12-10 ENCOUNTER — PRENATAL OFFICE VISIT (OUTPATIENT)
Dept: OBGYN | Facility: CLINIC | Age: 24
End: 2019-12-10
Payer: MEDICAID

## 2019-12-10 VITALS — WEIGHT: 155 LBS | SYSTOLIC BLOOD PRESSURE: 108 MMHG | BODY MASS INDEX: 25.02 KG/M2 | DIASTOLIC BLOOD PRESSURE: 72 MMHG

## 2019-12-10 DIAGNOSIS — Z34.00 ENCOUNTER FOR SUPERVISION OF NORMAL FIRST PREGNANCY, UNSPECIFIED TRIMESTER: ICD-10-CM

## 2019-12-10 DIAGNOSIS — O21.9 NAUSEA AND VOMITING IN PREGNANCY PRIOR TO 22 WEEKS GESTATION: ICD-10-CM

## 2019-12-10 PROCEDURE — 99212 OFFICE O/P EST SF 10 MIN: CPT | Performed by: ADVANCED PRACTICE MIDWIFE

## 2019-12-10 NOTE — PROGRESS NOTES
Feeling much better!  No longer needs IV fluids and not using meds.  Discussed ordering US at next visit.    Denies any leaking of fluid, vaginal bleeding, regular uterine contractions, or headaches or other concerns.  Reviewed to call [phone] for ontractions, loss of fluid, vaginal bleeding, decreased fetal movement or any other questions or concerns.    RTC in 4 weeks.  Edita Ramirez, KEL, APRN, CNM

## 2020-01-14 ENCOUNTER — PRENATAL OFFICE VISIT (OUTPATIENT)
Dept: OBGYN | Facility: CLINIC | Age: 25
End: 2020-01-14
Payer: COMMERCIAL

## 2020-01-14 VITALS — SYSTOLIC BLOOD PRESSURE: 100 MMHG | WEIGHT: 150 LBS | BODY MASS INDEX: 24.21 KG/M2 | DIASTOLIC BLOOD PRESSURE: 65 MMHG

## 2020-01-14 DIAGNOSIS — Z34.92 PRENATAL CARE IN SECOND TRIMESTER: Primary | ICD-10-CM

## 2020-01-14 PROCEDURE — 99207 ZZC PRENATAL VISIT: CPT | Performed by: ADVANCED PRACTICE MIDWIFE

## 2020-01-14 RX ORDER — PRENATAL VIT/IRON FUM/FOLIC AC 27MG-0.8MG
1 TABLET ORAL DAILY
Qty: 90 TABLET | Refills: 3 | Status: SHIPPED | OUTPATIENT
Start: 2020-01-14 | End: 2020-02-25

## 2020-01-14 NOTE — PROGRESS NOTES
Feeling well.  Baby is active. Denies any leaking of fluid, vaginal bleeding, regular uterine contractions, or headaches or other concerns.  140 lbs was documented in the paper chart for last visit.  So gained 10 lbs.  Says that she thinks this is correct.    US ordered   Reviewed to call [phone] for contractions, loss of fluid, vaginal bleeding, decreased fetal movement or any other questions or concerns.    RTC in 1-2 weeks for US and CNM visit.  Edita Ramirez, KEL, APRN, CNM

## 2020-02-07 ENCOUNTER — ANCILLARY PROCEDURE (OUTPATIENT)
Dept: ULTRASOUND IMAGING | Facility: CLINIC | Age: 25
End: 2020-02-07
Attending: ADVANCED PRACTICE MIDWIFE
Payer: COMMERCIAL

## 2020-02-07 ENCOUNTER — PRENATAL OFFICE VISIT (OUTPATIENT)
Dept: MIDWIFE SERVICES | Facility: CLINIC | Age: 25
End: 2020-02-07
Attending: ADVANCED PRACTICE MIDWIFE
Payer: COMMERCIAL

## 2020-02-07 VITALS
DIASTOLIC BLOOD PRESSURE: 69 MMHG | BODY MASS INDEX: 25.5 KG/M2 | SYSTOLIC BLOOD PRESSURE: 109 MMHG | HEART RATE: 89 BPM | WEIGHT: 158 LBS

## 2020-02-07 DIAGNOSIS — O21.9 NAUSEA AND VOMITING IN PREGNANCY PRIOR TO 22 WEEKS GESTATION: ICD-10-CM

## 2020-02-07 DIAGNOSIS — Z34.00 ENCOUNTER FOR SUPERVISION OF NORMAL FIRST PREGNANCY, UNSPECIFIED TRIMESTER: ICD-10-CM

## 2020-02-07 PROCEDURE — 76805 OB US >/= 14 WKS SNGL FETUS: CPT | Performed by: OBSTETRICS & GYNECOLOGY

## 2020-02-07 PROCEDURE — 99207 ZZC PRENATAL VISIT: CPT | Performed by: ADVANCED PRACTICE MIDWIFE

## 2020-02-07 NOTE — PROGRESS NOTES
"Feeling well.  Baby is active. Denies any leaking of fluid, vaginal bleeding, regular uterine contractions, or headaches or other concerns.  Reviewed preliminary US results \"WNL\".  They are delighted!    Discussed GCT at 24 weeks.  He has this scheduled at [facility].    Reviewed to call [phone] for contractions, loss of fluid, vaginal bleeding, decreased fetal movement or any other questions or concerns.    RTC in 3 weeks.  Edita Ramirez, KEL, APRN, CNM               "

## 2020-02-18 ENCOUNTER — TELEPHONE (OUTPATIENT)
Dept: MIDWIFE SERVICES | Facility: CLINIC | Age: 25
End: 2020-02-18

## 2020-02-18 NOTE — TELEPHONE ENCOUNTER
"Patient stated that she has had several nose bleeds since she got pregnant. They have been \"really bad\" over the last couple of days. Did discuss that nosebleeds are common in pregnancy and as long as she is able to stop them on their own, no need to be seen. If prolonged bleeding, should be seen in ER/UC for further evaluation and management. Patient aware and will call back if further questions or concerns.  Lianet Cox RN    "

## 2020-02-18 NOTE — TELEPHONE ENCOUNTER
Mayte from [facility] pt called and wanted to talk to someone regarding frequent nose bleeds.Please advise patient

## 2020-02-25 ENCOUNTER — PRENATAL OFFICE VISIT (OUTPATIENT)
Dept: OBGYN | Facility: CLINIC | Age: 25
End: 2020-02-25
Payer: COMMERCIAL

## 2020-02-25 VITALS — DIASTOLIC BLOOD PRESSURE: 58 MMHG | BODY MASS INDEX: 25.1 KG/M2 | WEIGHT: 155.5 LBS | SYSTOLIC BLOOD PRESSURE: 88 MMHG

## 2020-02-25 DIAGNOSIS — O21.9 NAUSEA AND VOMITING IN PREGNANCY PRIOR TO 22 WEEKS GESTATION: ICD-10-CM

## 2020-02-25 DIAGNOSIS — Z34.92 PRENATAL CARE IN SECOND TRIMESTER: ICD-10-CM

## 2020-02-25 DIAGNOSIS — Z34.00 ENCOUNTER FOR SUPERVISION OF NORMAL FIRST PREGNANCY, UNSPECIFIED TRIMESTER: ICD-10-CM

## 2020-02-25 LAB
GLUCOSE 1H P 50 G GLC PO SERPL-MCNC: 96 MG/DL (ref 60–129)
HEMOGLOBIN: 11.9 G/DL (ref 11.7–15.7)

## 2020-02-25 PROCEDURE — 36415 COLL VENOUS BLD VENIPUNCTURE: CPT | Performed by: ADVANCED PRACTICE MIDWIFE

## 2020-02-25 PROCEDURE — 99207 ZZC PRENATAL VISIT: CPT | Performed by: ADVANCED PRACTICE MIDWIFE

## 2020-02-25 PROCEDURE — 00000218 HCL OB HEMOGLOBIN (NS/SS): Performed by: ADVANCED PRACTICE MIDWIFE

## 2020-02-25 PROCEDURE — 82950 GLUCOSE TEST: CPT | Performed by: ADVANCED PRACTICE MIDWIFE

## 2020-02-25 RX ORDER — PRENATAL VIT/IRON FUM/FOLIC AC 27MG-0.8MG
1 TABLET ORAL DAILY
Qty: 90 TABLET | Refills: 3 | Status: SHIPPED | OUTPATIENT
Start: 2020-02-25 | End: 2020-04-30

## 2020-02-25 NOTE — PROGRESS NOTES
Here at [facility], feeling well. Having a little nausea after the glucose drink. Baby is active. No regular contractions, cramping, LOF or bleeding. GCT, hgb today. Planning trip at end of March, will RTC in 4 weeks just before her trip. Plan to print out record for her to take. Discussed precautions for flying. MML

## 2020-02-26 ENCOUNTER — TELEPHONE (OUTPATIENT)
Dept: MIDWIFE SERVICES | Facility: CLINIC | Age: 25
End: 2020-02-26

## 2020-02-26 NOTE — TELEPHONE ENCOUNTER
Patient got results on MyChart and wanted to know what they meant. Discussed with patient that she passed her 1 hr gct and her hgb was normal. Pt stated understanding.   Cuca Cavanaugh RN-BSN

## 2020-03-11 ENCOUNTER — HEALTH MAINTENANCE LETTER (OUTPATIENT)
Age: 25
End: 2020-03-11

## 2020-03-19 ENCOUNTER — PRENATAL OFFICE VISIT (OUTPATIENT)
Dept: MIDWIFE SERVICES | Facility: CLINIC | Age: 25
End: 2020-03-19
Payer: COMMERCIAL

## 2020-03-19 DIAGNOSIS — Z34.00 ENCOUNTER FOR SUPERVISION OF NORMAL FIRST PREGNANCY, UNSPECIFIED TRIMESTER: ICD-10-CM

## 2020-03-19 DIAGNOSIS — O21.9 NAUSEA AND VOMITING IN PREGNANCY PRIOR TO 22 WEEKS GESTATION: ICD-10-CM

## 2020-03-19 PROCEDURE — 99207 ZZC PRENATAL VISIT: CPT | Performed by: ADVANCED PRACTICE MIDWIFE

## 2020-03-19 NOTE — PROGRESS NOTES
27w4d  TELEPHONE PRENATAL VISIT.  Heartburn with every meal; active fetal movement. Denies vaginal bleeding, loss of fluid, contractions, headache.  She is working at [employer] and going to work. Denies cough, fever, and SOB.  Discussed normal GCT and Hgb results  Discussed warning signs of  labor.  Encouraged to take OTC medications for heartburn and will send herbal handout via My Chart.  Return to clinic in person in 4 weeks at [place].  ROHAN/ ALEXEY

## 2020-03-24 ENCOUNTER — TELEPHONE (OUTPATIENT)
Dept: MIDWIFE SERVICES | Facility: CLINIC | Age: 25
End: 2020-03-24
Payer: COMMERCIAL

## 2020-03-24 DIAGNOSIS — B37.31 YEAST VAGINITIS: ICD-10-CM

## 2020-03-24 DIAGNOSIS — B96.89 BACTERIAL VAGINOSIS IN PREGNANCY: Primary | ICD-10-CM

## 2020-03-24 DIAGNOSIS — O23.599 BACTERIAL VAGINOSIS IN PREGNANCY: Primary | ICD-10-CM

## 2020-03-24 PROCEDURE — 99213 OFFICE O/P EST LOW 20 MIN: CPT | Performed by: ADVANCED PRACTICE MIDWIFE

## 2020-03-24 RX ORDER — METRONIDAZOLE 500 MG/1
500 TABLET ORAL 2 TIMES DAILY
Qty: 14 TABLET | Refills: 1 | Status: SHIPPED | OUTPATIENT
Start: 2020-03-24 | End: 2020-04-30

## 2020-03-24 RX ORDER — MICONAZOLE NITRATE 2 %
1 CREAM WITH APPLICATOR VAGINAL AT BEDTIME
Qty: 45 G | Refills: 1 | Status: SHIPPED | OUTPATIENT
Start: 2020-03-24 | End: 2020-04-30

## 2020-03-24 NOTE — TELEPHONE ENCOUNTER
Pt is 28w, last clinic visit 3/19/2020, complaining about lower abominal/pelvic discomfort.  She says the pain is difficult to deal with and she has to go to work.  Not related to BM (she is having regular BMs) and she is feeling regular movements from baby.  Pt is at work currently but she is uncomfortable.  Can you call her to assist further?  [phone].  Rachana Jaime RN

## 2020-03-24 NOTE — TELEPHONE ENCOUNTER
TELEPHONE VISIT    SUBJECTIVE: Marielena Rodriguez is a 24 year old  female presents with abnormal vaginal discharge   for 2 days. Patient's last menstrual period was 09/08/2019 (exact date).  28w2d       General medical, surgical, OB/Gyn and social histories   reviewed and updated in Histories section of [facility].       CC: patient calling reporting abdominal cramping bilaterally across lower abdomen.  Does not come and go tends to stay constant,  After discussion states has had increased vaginal discharge that is itchy and odorous.  DUE TO CORONA -19 will have patient not come to clinic and treat based upon symptoms    Vaginal symptoms: discharge described as white, vulvar itching and odor.  Vulvar symptoms: local irritation and odor.  Discharge described as: white and watery.  Other associated symptoms: abdominal pain.  Menstrual pattern: She had been bleeding 28 week s pregnant.    OBJECTIVE:  Patient appears well, vital signs normal. Abdomen normal, soft   without tenderness, guarding, mass or organomegaly. No inguinal   adenopathy or CVA tenderness.    Pelvic Exam:deferred TELEPHONE ENCOUNTER  Cultures obtained: none indicated.    ASSESSMENT:   yeast, bacterial vaginosis.  (O23.599,  B96.89) Bacterial vaginosis in pregnancy  (primary encounter diagnosis)    Plan: metroNIDAZOLE (FLAGYL) 500 MG tablet            (B37.3) Yeast vaginitis  Comment:   Plan: miconazole (MICONAZOLE 7) 2 % cream              PLAN:  Treatment plan per orders in [facility]. STD prevention discussed.   Abstain from intercourse for duration of treatment. Return if   symptoms do not resolve as anticipated.Based upon symptoms over the phone, will treat for bacterial vaginosis and yeast instructions given and if not better in 48 hours patinet should call.  Metronidazole 500 mg twice per day for 7 days sent to preferred pharmacy.  Patient has been instructed to abstain from ETOH and sexual intercourse during the course of this  treatment.  miconozole cream at night x 7 days instructions given.    Due to CORONA-19 will treat patient over phone based upon symptoms  Total time talking to patient about symptoms of  labor, bacterial vaginosis and yeast 15 minutes.  Karen Ahumada CNM

## 2020-04-07 ENCOUNTER — TELEPHONE (OUTPATIENT)
Dept: MIDWIFE SERVICES | Facility: CLINIC | Age: 25
End: 2020-04-07

## 2020-04-07 NOTE — TELEPHONE ENCOUNTER
Because the patient does not have obstetrical concerns, mild symptoms, and a low risk pregnancy, she should be directed to [facility].  Mike Pa CNM

## 2020-04-07 NOTE — TELEPHONE ENCOUNTER
Patient is 30w2d, having SOB and not doing any activities to increase her breathing. Feels like something is blocking her lungs. Nose was running a couple of days ago, but that stopped. Thinks it was a change in weather.  No cough, no fever. I asked her if she was able to take a deep breath to slow down her breathing. She said she has to take a deep breath in order to breathe. On Sunday had to prop herself up to breathe. Today went to work, but heart is racing and having a hard time breathing. Patient very anxious on the phone. Routing to on-call CNM. Do you want pt to come in to L&D for eval or clinic appt? Can you call patient to discuss?   Cuca Cavanaugh, RN-BSN

## 2020-04-07 NOTE — TELEPHONE ENCOUNTER
TC to patient. Discussed message below. 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 Advised to go to [facility] and pt stated that she would. Advised to call back with further questions or concerns.   Cuca Cavanaugh, CHLOE-BSN

## 2020-04-16 ENCOUNTER — PRENATAL OFFICE VISIT (OUTPATIENT)
Dept: MIDWIFE SERVICES | Facility: CLINIC | Age: 25
End: 2020-04-16
Payer: COMMERCIAL

## 2020-04-16 VITALS — BODY MASS INDEX: 26.63 KG/M2 | WEIGHT: 165 LBS

## 2020-04-16 DIAGNOSIS — Z34.83 ENCOUNTER FOR SUPERVISION OF OTHER NORMAL PREGNANCY, THIRD TRIMESTER: Primary | ICD-10-CM

## 2020-04-16 PROCEDURE — 99207 ZZC PRENATAL VISIT: CPT | Performed by: ADVANCED PRACTICE MIDWIFE

## 2020-04-16 NOTE — PROGRESS NOTES
"Marielena Rodriguez is a 24 year old female who is being evaluated via a billable telephone visit.      The patient has been notified of following:     \"This telephone visit will be conducted via a call between you and your physician/provider. We have found that certain health care needs can be provided without the need for a physical exam.  This service lets us provide the care you need with a short phone conversation.  If a prescription is necessary we can send it directly to your pharmacy.  If lab work is needed we can place an order for that and you can then stop by our lab to have the test done at a later time.    Telephone visits are billed at different rates depending on your insurance coverage. During this emergency period, for some insurers they may be billed the same as an in-person visit.  Please reach out to your insurance provider with any questions.    If during the course of the call the physician/provider feels a telephone visit is not appropriate, you will not be charged for this service.\"    Patient has given verbal consent for Telephone visit?  Yes    Additional provider notes:   31w4d   Patient feeling well. Positive fetal movement. Denies water leaking, vaginal bleeding, decreased fetal movement, or contraction pain.  Having some SOB. Was going to call oncare as she was advised to do last week but felt like it was pregnancy related and no COVID related so did not. Feels like it is manageable and pregnancy related. We discussed reasons to come in for evaluation like chest pain, worsening SOB, SOB when resting, etc. She agrees.   She had discharge that started a few weeks ago. During her last phone visit she was given treatment for yeast and bacterial vaginosis. Feels like she still has the discharge but some of the itching is better. We discussed if she continues to have symptoms she needs to come in for a visit.   She reports heartburn as well. She has stopped eating spicy food. She has not needed " treatment and declines today. We discussed tums or pepcid as treatment if needed.   She also reports occasional headaches but nothing concerning to her. Not lasting and only occasionally, not daily.   Danger signs reviewed, pre-eclampsia signs and symptoms discussed.   Knows when to call triage and has phone numbers.   Follow up in 2 weeks for in person visit. Will also have in person visit for her 36 week visit.      Phone call duration: 15 minutes    ELIZABETH Snyder CNM

## 2020-04-30 ENCOUNTER — PRENATAL OFFICE VISIT (OUTPATIENT)
Dept: MIDWIFE SERVICES | Facility: CLINIC | Age: 25
End: 2020-04-30
Payer: COMMERCIAL

## 2020-04-30 VITALS
SYSTOLIC BLOOD PRESSURE: 109 MMHG | DIASTOLIC BLOOD PRESSURE: 67 MMHG | HEART RATE: 109 BPM | BODY MASS INDEX: 27.43 KG/M2 | TEMPERATURE: 97.4 F | WEIGHT: 170.7 LBS | HEIGHT: 66 IN

## 2020-04-30 DIAGNOSIS — Z34.03 ENCOUNTER FOR SUPERVISION OF NORMAL FIRST PREGNANCY, THIRD TRIMESTER: Primary | ICD-10-CM

## 2020-04-30 PROCEDURE — 99207 ZZC PRENATAL VISIT: CPT | Performed by: ADVANCED PRACTICE MIDWIFE

## 2020-04-30 RX ORDER — PRENATAL VIT/IRON FUM/FOLIC AC 27MG-0.8MG
1 TABLET ORAL DAILY
Qty: 90 TABLET | Refills: 3 | Status: SHIPPED | OUTPATIENT
Start: 2020-04-30 | End: 2021-10-05

## 2020-04-30 ASSESSMENT — MIFFLIN-ST. JEOR: SCORE: 1551.04

## 2020-04-30 NOTE — PROGRESS NOTES
33w4d  Still having some issues with SOB, happening since beginning of pregnancy. Denies fever/cough. Baby is active. Denies bleeding, leaking of fluid, edema. Occasional headaches. Refilled PNV. Discussed Tdap. She is unsure and wants to think about it more, please discuss again at 36w. Discussed Covid restrictions (1 partner, bring all things, etc). Discussed HGB/GBS at next visit after 36w.  Mike Pa CNM

## 2020-05-18 ENCOUNTER — PRENATAL OFFICE VISIT (OUTPATIENT)
Dept: MIDWIFE SERVICES | Facility: CLINIC | Age: 25
End: 2020-05-18
Payer: COMMERCIAL

## 2020-05-18 VITALS
WEIGHT: 171.8 LBS | DIASTOLIC BLOOD PRESSURE: 69 MMHG | HEART RATE: 120 BPM | SYSTOLIC BLOOD PRESSURE: 106 MMHG | BODY MASS INDEX: 27.73 KG/M2

## 2020-05-18 DIAGNOSIS — Z34.03 ENCOUNTER FOR SUPERVISION OF NORMAL FIRST PREGNANCY, THIRD TRIMESTER: Primary | ICD-10-CM

## 2020-05-18 LAB
CAPILLARY BLOOD COLLECTION: NORMAL
HGB BLD-MCNC: 13.2 G/DL (ref 11.7–15.7)

## 2020-05-18 PROCEDURE — 87653 STREP B DNA AMP PROBE: CPT | Performed by: ADVANCED PRACTICE MIDWIFE

## 2020-05-18 PROCEDURE — 99207 ZZC PRENATAL VISIT: CPT | Performed by: ADVANCED PRACTICE MIDWIFE

## 2020-05-18 PROCEDURE — 87186 SC STD MICRODIL/AGAR DIL: CPT | Performed by: ADVANCED PRACTICE MIDWIFE

## 2020-05-18 PROCEDURE — 36416 COLLJ CAPILLARY BLOOD SPEC: CPT | Performed by: ADVANCED PRACTICE MIDWIFE

## 2020-05-18 PROCEDURE — 00000218 ZZHCL STATISTIC OBHBG - HEMOGLOBIN: Performed by: ADVANCED PRACTICE MIDWIFE

## 2020-05-18 ASSESSMENT — PATIENT HEALTH QUESTIONNAIRE - PHQ9: SUM OF ALL RESPONSES TO PHQ QUESTIONS 1-9: 4

## 2020-05-18 NOTE — PROGRESS NOTES
Feeling well, no concerns. Baby is active. No regular contractions, LOF or bleeding. Reviewed phone numbers to call, where to go when in labor. GBS, hgb today. BSUS confirmed vertex presentation. RTC weekly. MML

## 2020-05-19 LAB
GP B STREP DNA SPEC QL NAA+PROBE: POSITIVE
SPECIMEN SOURCE: ABNORMAL

## 2020-05-22 LAB
BACTERIA SPEC CULT: ABNORMAL
SPECIMEN SOURCE: ABNORMAL

## 2020-05-28 ENCOUNTER — PRENATAL OFFICE VISIT (OUTPATIENT)
Dept: MIDWIFE SERVICES | Facility: CLINIC | Age: 25
End: 2020-05-28
Payer: COMMERCIAL

## 2020-05-28 VITALS
SYSTOLIC BLOOD PRESSURE: 104 MMHG | TEMPERATURE: 98.2 F | HEART RATE: 104 BPM | BODY MASS INDEX: 27.89 KG/M2 | WEIGHT: 172.8 LBS | DIASTOLIC BLOOD PRESSURE: 66 MMHG

## 2020-05-28 DIAGNOSIS — O21.9 NAUSEA AND VOMITING IN PREGNANCY PRIOR TO 22 WEEKS GESTATION: ICD-10-CM

## 2020-05-28 DIAGNOSIS — Z34.00 ENCOUNTER FOR SUPERVISION OF NORMAL FIRST PREGNANCY, UNSPECIFIED TRIMESTER: Primary | ICD-10-CM

## 2020-05-28 PROCEDURE — 99207 ZZC PRENATAL VISIT: CPT | Performed by: ADVANCED PRACTICE MIDWIFE

## 2020-05-28 NOTE — PROGRESS NOTES
37w4d  Marielena is feeling well today. She requests a breast pump Rx. Reviewed that she can also receive a breast pump on postpartum unit. Reviewed options. Reports good fetal movement. Denies leaking of fluid, vaginal bleeding, regular uterine contractions, headache, visual changes, or other concerns. Return to clinic weekly.    ELIZABETH Dooley CNM

## 2020-06-04 ENCOUNTER — MEDICAL CORRESPONDENCE (OUTPATIENT)
Dept: HEALTH INFORMATION MANAGEMENT | Facility: CLINIC | Age: 25
End: 2020-06-04

## 2020-06-04 ENCOUNTER — PRENATAL OFFICE VISIT (OUTPATIENT)
Dept: MIDWIFE SERVICES | Facility: CLINIC | Age: 25
End: 2020-06-04
Payer: COMMERCIAL

## 2020-06-04 VITALS
WEIGHT: 175.2 LBS | DIASTOLIC BLOOD PRESSURE: 66 MMHG | HEART RATE: 84 BPM | TEMPERATURE: 98 F | SYSTOLIC BLOOD PRESSURE: 110 MMHG | BODY MASS INDEX: 28.28 KG/M2

## 2020-06-04 DIAGNOSIS — Z34.00 ENCOUNTER FOR SUPERVISION OF NORMAL FIRST PREGNANCY, UNSPECIFIED TRIMESTER: Primary | ICD-10-CM

## 2020-06-04 PROCEDURE — 59426 ANTEPARTUM CARE ONLY: CPT | Performed by: ADVANCED PRACTICE MIDWIFE

## 2020-06-04 PROCEDURE — 99207 ZZC PRENATAL VISIT: CPT | Performed by: ADVANCED PRACTICE MIDWIFE

## 2020-06-04 NOTE — PROGRESS NOTES
Here in clinic, feeling well with no concerns. Baby is active. No regular contractions, LOF or bleeding. Is noticing increased BH contractions. Reviewed phone numbers, where to go, visitor restrictions. RTC in 1 week. MML

## 2020-06-08 ENCOUNTER — NURSE TRIAGE (OUTPATIENT)
Dept: NURSING | Facility: CLINIC | Age: 25
End: 2020-06-08

## 2020-06-08 ENCOUNTER — HOSPITAL ENCOUNTER (OUTPATIENT)
Facility: CLINIC | Age: 25
Discharge: HOME | End: 2020-06-08
Attending: ADVANCED PRACTICE MIDWIFE | Admitting: ADVANCED PRACTICE MIDWIFE
Payer: COMMERCIAL

## 2020-06-08 ENCOUNTER — TELEPHONE (OUTPATIENT)
Dept: MIDWIFE SERVICES | Facility: CLINIC | Age: 25
End: 2020-06-08

## 2020-06-08 ENCOUNTER — HOSPITAL ENCOUNTER (INPATIENT)
Facility: CLINIC | Age: 25
LOS: 1 days | Discharge: HOME | End: 2020-06-09
Attending: ADVANCED PRACTICE MIDWIFE | Admitting: ADVANCED PRACTICE MIDWIFE
Payer: COMMERCIAL

## 2020-06-08 VITALS — RESPIRATION RATE: 18 BRPM | TEMPERATURE: 98 F | SYSTOLIC BLOOD PRESSURE: 103 MMHG | DIASTOLIC BLOOD PRESSURE: 60 MMHG

## 2020-06-08 PROCEDURE — 59025 FETAL NON-STRESS TEST: CPT

## 2020-06-08 PROCEDURE — 59025 FETAL NON-STRESS TEST: CPT | Mod: 26 | Performed by: ADVANCED PRACTICE MIDWIFE

## 2020-06-08 PROCEDURE — 99213 OFFICE O/P EST LOW 20 MIN: CPT | Mod: 25 | Performed by: ADVANCED PRACTICE MIDWIFE

## 2020-06-08 PROCEDURE — G0463 HOSPITAL OUTPT CLINIC VISIT: HCPCS | Mod: 25

## 2020-06-08 PROCEDURE — 12000001 ZZH R&B MED SURG/OB UMMC

## 2020-06-08 PROCEDURE — 25000128 H RX IP 250 OP 636: Performed by: ADVANCED PRACTICE MIDWIFE

## 2020-06-08 PROCEDURE — G0463 HOSPITAL OUTPT CLINIC VISIT: HCPCS

## 2020-06-08 RX ORDER — CARBOPROST TROMETHAMINE 250 UG/ML
250 INJECTION, SOLUTION INTRAMUSCULAR
Status: DISCONTINUED | OUTPATIENT
Start: 2020-06-08 | End: 2020-06-09 | Stop reason: HOSPADM

## 2020-06-08 RX ORDER — OXYTOCIN 10 [USP'U]/ML
10 INJECTION, SOLUTION INTRAMUSCULAR; INTRAVENOUS
Status: DISCONTINUED | OUTPATIENT
Start: 2020-06-08 | End: 2020-06-09 | Stop reason: HOSPADM

## 2020-06-08 RX ORDER — NALOXONE HYDROCHLORIDE 0.4 MG/ML
.1-.4 INJECTION, SOLUTION INTRAMUSCULAR; INTRAVENOUS; SUBCUTANEOUS
Status: DISCONTINUED | OUTPATIENT
Start: 2020-06-08 | End: 2020-06-09 | Stop reason: HOSPADM

## 2020-06-08 RX ORDER — SODIUM CHLORIDE, SODIUM LACTATE, POTASSIUM CHLORIDE, CALCIUM CHLORIDE 600; 310; 30; 20 MG/100ML; MG/100ML; MG/100ML; MG/100ML
INJECTION, SOLUTION INTRAVENOUS CONTINUOUS
Status: DISCONTINUED | OUTPATIENT
Start: 2020-06-08 | End: 2020-06-09 | Stop reason: HOSPADM

## 2020-06-08 RX ORDER — MORPHINE SULFATE 10 MG/ML
10 INJECTION, SOLUTION INTRAMUSCULAR; INTRAVENOUS ONCE
Status: COMPLETED | OUTPATIENT
Start: 2020-06-08 | End: 2020-06-08

## 2020-06-08 RX ORDER — OXYTOCIN/0.9 % SODIUM CHLORIDE 30/500 ML
100-340 PLASTIC BAG, INJECTION (ML) INTRAVENOUS CONTINUOUS PRN
Status: DISCONTINUED | OUTPATIENT
Start: 2020-06-08 | End: 2020-06-09 | Stop reason: HOSPADM

## 2020-06-08 RX ORDER — ONDANSETRON 2 MG/ML
4 INJECTION INTRAMUSCULAR; INTRAVENOUS EVERY 6 HOURS PRN
Status: DISCONTINUED | OUTPATIENT
Start: 2020-06-08 | End: 2020-06-09 | Stop reason: HOSPADM

## 2020-06-08 RX ORDER — ACETAMINOPHEN 325 MG/1
650 TABLET ORAL EVERY 4 HOURS PRN
Status: DISCONTINUED | OUTPATIENT
Start: 2020-06-08 | End: 2020-06-09 | Stop reason: HOSPADM

## 2020-06-08 RX ORDER — PENICILLIN G POTASSIUM 5000000 [IU]/1
5 INJECTION, POWDER, FOR SOLUTION INTRAMUSCULAR; INTRAVENOUS ONCE
Status: DISCONTINUED | OUTPATIENT
Start: 2020-06-08 | End: 2020-06-09 | Stop reason: HOSPADM

## 2020-06-08 RX ORDER — IBUPROFEN 800 MG/1
800 TABLET, FILM COATED ORAL
Status: DISCONTINUED | OUTPATIENT
Start: 2020-06-08 | End: 2020-06-09 | Stop reason: HOSPADM

## 2020-06-08 RX ORDER — PROMETHAZINE HYDROCHLORIDE 25 MG/ML
25 INJECTION INTRAMUSCULAR; INTRAVENOUS ONCE
Status: COMPLETED | OUTPATIENT
Start: 2020-06-08 | End: 2020-06-08

## 2020-06-08 RX ORDER — METHYLERGONOVINE MALEATE 0.2 MG/ML
200 INJECTION INTRAVENOUS
Status: DISCONTINUED | OUTPATIENT
Start: 2020-06-08 | End: 2020-06-09 | Stop reason: HOSPADM

## 2020-06-08 RX ORDER — FENTANYL CITRATE 50 UG/ML
50-100 INJECTION, SOLUTION INTRAMUSCULAR; INTRAVENOUS
Status: DISCONTINUED | OUTPATIENT
Start: 2020-06-08 | End: 2020-06-09 | Stop reason: HOSPADM

## 2020-06-08 RX ORDER — ONDANSETRON 2 MG/ML
4 INJECTION INTRAMUSCULAR; INTRAVENOUS EVERY 6 HOURS PRN
Status: DISCONTINUED | OUTPATIENT
Start: 2020-06-08 | End: 2020-06-08 | Stop reason: HOSPADM

## 2020-06-08 RX ORDER — OXYCODONE AND ACETAMINOPHEN 5; 325 MG/1; MG/1
1 TABLET ORAL
Status: DISCONTINUED | OUTPATIENT
Start: 2020-06-08 | End: 2020-06-09 | Stop reason: HOSPADM

## 2020-06-08 RX ADMIN — MORPHINE SULFATE 10 MG: 10 INJECTION INTRAVENOUS at 23:13

## 2020-06-08 RX ADMIN — PROMETHAZINE HYDROCHLORIDE 25 MG: 25 INJECTION INTRAMUSCULAR; INTRAVENOUS at 23:13

## 2020-06-08 NOTE — DISCHARGE INSTRUCTIONS
Discharge Instruction for Undelivered Patients      You were seen for: Labor Assessment and Fetal Assessment  We Consulted: Shabana Reynoso CNM  You had (Test or Medicine):NST, SVE     Diet:   Drink 8 to 12 glasses of liquids (milk, juice, water) every day.  You may eat meals and snacks.     Activity:  Count fetal kicks everyday (see handout)  Call your doctor or nurse midwife if your baby is moving less than usual.     Call your provider if you notice:  Swelling in your face or increased swelling in your hands or legs.  Headaches that are not relieved by Tylenol (acetaminophen).  Changes in your vision (blurring: seeing spots or stars.)  Nausea (sick to your stomach) and vomiting (throwing up).   Weight gain of 5 pounds or more per week.  Heartburn that doesn't go away.  Signs of bladder infection: pain when you urinate (use the toilet), need to go more often and more urgently.  The bag of sainz (rupture of membranes) breaks, or you notice leaking in your underwear.  Bright red blood in your underwear.  Abdominal (lower belly) or stomach pain.  For first baby: Contractions (tightening) less than 5 minutes apart for one hour or more.  Increase or change in vaginal discharge (note the color and amount)  Other: Call your Midwife's pager number [phone] or clinic [phone] if contactions becomes more frequent and intense or return to hospital.     Follow-up:  As scheduled in the clinic

## 2020-06-08 NOTE — TELEPHONE ENCOUNTER
Phone call with Marielena. She started feeling contractions at midnight on 6/8. She was able to rest a bit, but contractions increased at 0400. Now, contractions are every 3 minutes, lasting 30-40 seconds. She moans through a contraction on the phone.    When asked, Marielena says that she does not feel her baby moving and cannot remember the last time she felt her baby moving. Usually baby is very active. Denies leaking of fluid, vaginal bleeding, or headache.    Encouraged to come [facility] now for fetal surveillance and evaluation of early labor. Gave directions over the phone of where to go and where to park.      Arely Orr, ELIZABETH MICHAEL

## 2020-06-08 NOTE — PLAN OF CARE
Data: [facility]   Reason for maternal/fetal assessment per patient is Rule Out Labor  . Patient is a . Prenatal record reviewed.   Medical History: History reviewed. No pertinent past medical history.. Gestational Age 39w1d. VSS. Cervix: dilated to 1/80/-2.  Fetal movement present. Patient denies vaginal discharge, pelvic pressure, UTI symptoms, GI problems, bloody show, vaginal bleeding, edema, headache, visual disturbances, epigastric or URQ pain, abdominal pain, rupture of membranes. Support persons GABRIELLA Cosby present.  Action: Verbal consent for EFM. Triage assessment completed. EFM applied for FHR. Uterine assessment for uterine activity. Fetal assessment: Presumed adequate fetal oxygenation documented (see flow record). Patient education pamphlets given on fetal movement counts and when to contact Midwife. Patient instructed to report change in fetal movement, vaginal leaking of fluid or bleeding, abdominal pain, or any concerns related to the pregnancy to her nurse/physician.   Response: CNM informed of FHR and uterine activity. Plan per provider is to have pt contact via pager when feeling uncomfortable and ready to return to hospital. Patient verbalized understanding of education and verbalized agreement with plan. Discharged ambulatory at 1016.

## 2020-06-08 NOTE — TELEPHONE ENCOUNTER
"Patient is pregnant and COSME is 20. Caller states she is having contractions that are 2 minutes apart for 1.5 hours. Caller states they last for 30 seconds at a time. This is caller's first baby. Triage guidelines recommend to go to labor and delivery. Caller verbalized and understands directives. Triager called out to answering service, left message for on call midwife Ember to call patient at [phone]. Caller advised to call back if no return call within 20 minutes.    Reason for Disposition    [1] First baby (primipara) AND [2] contractions < 6 minutes apart  AND [3] present 2 hours    Additional Information    Negative: Passed out (i.e., lost consciousness, collapsed and was not responding)    Negative: Shock suspected (e.g., cold/pale/clammy skin, too weak to stand, low BP, rapid pulse)    Negative: Difficult to awaken or acting confused (e.g., disoriented, slurred speech)    Negative: [1] SEVERE abdominal pain (e.g., excruciating) AND [2] constant AND [3] present > 1 hour    Negative: Severe bleeding (e.g., continuous red blood from vagina, or large blood clots)    Negative: Umbilical cord hanging out of the vagina (shiny, white, curled appearance, \"like telephone cord\")    Negative: Uncontrollable urge to push (i.e., feels like baby is coming out now)    Negative: Can see baby    Negative: Sounds like a life-threatening emergency to the triager    Negative: Pregnant < 37 weeks (i.e., )    Negative: [1] Uncertain delivery date AND [2] possibly pregnant < 37 weeks (i.e., )    Protocols used: PREGNANCY - LABOR-A-AH      "

## 2020-06-08 NOTE — PROVIDER NOTIFICATION
06/08/20 0950   Provider Notification   Provider Name/Title JYOTI Reynoso   Method of Notification At Bedside   Request Evaluate in Person   Notification Reason Status Update   PLan per pt to return home during early labor with plan to return to the hospital when uterine activity increases, and becomes more uncomfortable. Will proceed with discharge plans.

## 2020-06-08 NOTE — H&P
HOSPITAL TRIAGE NOTE  ===================    CHIEF COMPLAINT  ========================  Marielena Rodriguez is a 24 year old patient presenting today at 39w1d for evaluation of uterine contractions.    Patient's last menstrual period was 2019 (exact date).  Estimated Date of Delivery: 2020     HPI  ==================   Patient called this morning with reports of contractions. During their conversation she reported her baby was not moving so she was advised to come in for evaluation of fetus and labor status.   After presenting to triage she reported her baby was moving normally today but she was here for contractions. She reports they are about every 5 minutes and uncomfortable. They started at midnight and picked up at 4 am this morning. No leaking of fluid or bleeding. No other concerns.   CONTRACTIONS: moderate  ABDOMINAL PAIN: cramping  FETAL MOVEMENT: active    VAGINAL BLEEDING: none  RUPTURE OF MEMBRANES: no  PELVIC PAIN: none  OTHER: none    REVIEW OF SYSTEMS  =====================  C: NEGATIVE for fever, chills  I: NEGATIVE for worrisome rashes, moles or lesions  E: NEGATIVE for vision changes or irritation  R: NEGATIVE for significant cough or SOB  CV: NEGATIVE for chest pain, palpitations or varicosities  GI: NEGATIVE for nausea, abdominal pain, heartburn, or change in bowel habits  : NEGATIVE for frequency, dysuria, or hematuria  M: NEGATIVE for significant arthralgias or myalgia  N: NEGATIVE for headache, weakness, dizziness or paresthesias  P: NEGATIVE for changes in mood or affect    HISTORIES  ==============  ALLERGIES:    No Known Allergies  PAST MEDICAL HISTORY  History reviewed. No pertinent past medical history.  PARTNER: Mamdou     FAMILY HISTORY  Family History   Problem Relation Age of Onset     No Known Problems Mother      No Known Problems Father      Diabetes Maternal Grandmother      OB HISTORY  OB History    Para Term  AB Living   1 0 0 0 0 0   SAB TAB  Ectopic Multiple Live Births   0 0 0 0 0      # Outcome Date GA Lbr Earl/2nd Weight Sex Delivery Anes PTL Lv   1 Current                EXAM  ============  /60   Temp 98  F (36.7  C) (Oral)   Resp 18   LMP 09/08/2019 (Exact Date)   GENERAL APPEARANCE: healthy, alert and no distress  RESP: lungs clear to auscultation - no rales, rhonchi or wheezes  BREAST: normal without masses, tenderness or nipple discharge and no palpable axillary masses or adenopathy  CV: regular rates and rhythm, normal S1 S2, no S3 or S4 and no murmur,and no varicosities  ABDOMEN:  soft, nontender,non-tender between contractions no epigastric pain  NEURO: Denies headache, blurred vision  PSYCH: mentation appears normal. and affect normal/bright  LEGS: Reflexes normal bilaterally    CONTRACTIONS:  EVERY 5 MINUTES  moderate  FETAL HEART TONES:  135 with moderate variability, accelerations-yes, decels none.  NST: REACTIVE  EFW: 7 lb 10 oz     PELVIC EXAM: 1/ 80/ Posterior/ soft/ -2  STEVE SCORE: 6  PRESENTATION: VERTEX  BLOOD: no  DISCHARGE: none    ROM: No  POOLING: not done  AMNISURE: not done   FLUID: none    LABS:  COVID testing due to most likely admission later today     DIAGNOSIS  ============  39w1d  Early labor   NST: REACTIVE  Fetal Heart rate tracing: category one    PLAN  ============  Had a long discussion with patient about going home versus admission for early labor. She is having uncomfortable contractions but able to deal well with them. Desires an epidural during labor but does not need it now. Lives about 25-30 minutes away.  is with her and able to drive. Discussed benefits of going home versus staying. They discussed with each other and make the decision to go home for a short time and return. Good questions about when to return. We discussed returning with water leaking, vaginal bleeding, decreased fetal movement, and increased contraction pain. Discussed it is okay to return even in an hour if they get home  and feel uncomfortable at home. They express feeling good about this plan, pager number given to call with questions and when they are ready to come home.     ELIZABETH Snyder CNM

## 2020-06-08 NOTE — PROVIDER NOTIFICATION
06/08/20 0929   Provider Notification   Provider Name/Title JYOTI Reynoso   Method of Notification At Bedside   Request Evaluate in Person   Notification Reason Patient Arrived   Provider notified of pt arrival, FHR, uterine activity, intact membranes, reports + FM, SVE 1/80/-2. Options discussed for management of early labor discussed with pt. Pt to think it over and notify team with decision. Will proceed with ongoing assessment.

## 2020-06-09 ENCOUNTER — HOSPITAL ENCOUNTER (INPATIENT)
Facility: CLINIC | Age: 25
End: 2020-06-09
Admitting: ADVANCED PRACTICE MIDWIFE
Payer: COMMERCIAL

## 2020-06-09 VITALS — DIASTOLIC BLOOD PRESSURE: 57 MMHG | SYSTOLIC BLOOD PRESSURE: 95 MMHG | RESPIRATION RATE: 16 BRPM | TEMPERATURE: 97.8 F

## 2020-06-09 LAB
ABO + RH BLD: NORMAL
ABO + RH BLD: NORMAL
BASOPHILS # BLD AUTO: 0 10E9/L (ref 0–0.2)
BASOPHILS NFR BLD AUTO: 0.2 %
BLD GP AB SCN SERPL QL: NORMAL
BLOOD BANK CMNT PATIENT-IMP: NORMAL
DIFFERENTIAL METHOD BLD: ABNORMAL
EOSINOPHIL # BLD AUTO: 0.2 10E9/L (ref 0–0.7)
EOSINOPHIL NFR BLD AUTO: 1.2 %
ERYTHROCYTE [DISTWIDTH] IN BLOOD BY AUTOMATED COUNT: 14.2 % (ref 10–15)
HCT VFR BLD AUTO: 37.8 % (ref 35–47)
HGB BLD-MCNC: 12.9 G/DL (ref 11.7–15.7)
IMM GRANULOCYTES # BLD: 0.1 10E9/L (ref 0–0.4)
IMM GRANULOCYTES NFR BLD: 0.6 %
LYMPHOCYTES # BLD AUTO: 1.9 10E9/L (ref 0.8–5.3)
LYMPHOCYTES NFR BLD AUTO: 15.1 %
MCH RBC QN AUTO: 29.5 PG (ref 26.5–33)
MCHC RBC AUTO-ENTMCNC: 34.1 G/DL (ref 31.5–36.5)
MCV RBC AUTO: 87 FL (ref 78–100)
MONOCYTES # BLD AUTO: 1.6 10E9/L (ref 0–1.3)
MONOCYTES NFR BLD AUTO: 12.9 %
NEUTROPHILS # BLD AUTO: 8.7 10E9/L (ref 1.6–8.3)
NEUTROPHILS NFR BLD AUTO: 70 %
NRBC # BLD AUTO: 0 10*3/UL
NRBC BLD AUTO-RTO: 0 /100
PLATELET # BLD AUTO: 221 10E9/L (ref 150–450)
RBC # BLD AUTO: 4.37 10E12/L (ref 3.8–5.2)
SARS-COV-2 PCR COMMENT: NORMAL
SARS-COV-2 RNA SPEC QL NAA+PROBE: NEGATIVE
SARS-COV-2 RNA SPEC QL NAA+PROBE: NORMAL
SPECIMEN EXP DATE BLD: NORMAL
SPECIMEN SOURCE: NORMAL
SPECIMEN SOURCE: NORMAL
T PALLIDUM AB SER QL: NONREACTIVE
WBC # BLD AUTO: 12.5 10E9/L (ref 4–11)

## 2020-06-09 PROCEDURE — 85025 COMPLETE CBC W/AUTO DIFF WBC: CPT | Performed by: ADVANCED PRACTICE MIDWIFE

## 2020-06-09 PROCEDURE — U0003 INFECTIOUS AGENT DETECTION BY NUCLEIC ACID (DNA OR RNA); SEVERE ACUTE RESPIRATORY SYNDROME CORONAVIRUS 2 (SARS-COV-2) (CORONAVIRUS DISEASE [COVID-19]), AMPLIFIED PROBE TECHNIQUE, MAKING USE OF HIGH THROUGHPUT TECHNOLOGIES AS DESCRIBED BY CMS-2020-01-R: HCPCS | Performed by: ADVANCED PRACTICE MIDWIFE

## 2020-06-09 PROCEDURE — 86780 TREPONEMA PALLIDUM: CPT | Performed by: ADVANCED PRACTICE MIDWIFE

## 2020-06-09 PROCEDURE — 86900 BLOOD TYPING SEROLOGIC ABO: CPT | Performed by: ADVANCED PRACTICE MIDWIFE

## 2020-06-09 PROCEDURE — 86901 BLOOD TYPING SEROLOGIC RH(D): CPT | Performed by: ADVANCED PRACTICE MIDWIFE

## 2020-06-09 PROCEDURE — 99238 HOSP IP/OBS DSCHRG MGMT 30/<: CPT | Performed by: ADVANCED PRACTICE MIDWIFE

## 2020-06-09 PROCEDURE — 25800030 ZZH RX IP 258 OP 636: Performed by: ADVANCED PRACTICE MIDWIFE

## 2020-06-09 PROCEDURE — 86850 RBC ANTIBODY SCREEN: CPT | Performed by: ADVANCED PRACTICE MIDWIFE

## 2020-06-09 RX ADMIN — SODIUM CHLORIDE, POTASSIUM CHLORIDE, SODIUM LACTATE AND CALCIUM CHLORIDE: 600; 310; 30; 20 INJECTION, SOLUTION INTRAVENOUS at 00:15

## 2020-06-09 RX ADMIN — SODIUM CHLORIDE, POTASSIUM CHLORIDE, SODIUM LACTATE AND CALCIUM CHLORIDE 500 ML: 600; 310; 30; 20 INJECTION, SOLUTION INTRAVENOUS at 03:04

## 2020-06-09 NOTE — PLAN OF CARE
VSS. Afebrile. Pt slept comfortably overnight. Pt feels contractions when awake. Denies LOF. See flowsheets for EFM/TOCO. Plan per provider is to discharge pt home.

## 2020-06-09 NOTE — PROGRESS NOTES
S: Patient is doing well. Feels better with morphine and phenergan and feels like she could sleep now. She still feels the contractions but is hopeful she can get some rest and relief from the pain. IV fluid bolus given since she reported not drinking much today and to help space her contractions while she rests. Admission labs done. Will collect COVID testing and allow patient to rest. No other questions or concerns at this time.     O:  Blood pressure 99/62, temperature 97.7  F (36.5  C), temperature source Oral, resp. rate 18, last menstrual period 2019, not currently breastfeeding.  General appearance: comfortable    CONTACTIONS: Contractions every 5-8 minutes.  Palpate: moderate  FETAL HEART TONES: baseline 130 with moderate FHR variability and    accelerations yes. Decelerations no.    NST: REACTIVE  ROM: not ruptured  PELVIC EXAM: deferred  Fetal Position: cephalic   Bloody show: No  Pitocin- none,  Antibiotics- none  Cervical ripening: N/A    ASSESSMENT:  ==============  IUP @ 39w2d early labor versus prodromal labor. Therapeutic sleep   Fetal Heart rate tracing category one  GBS- positive     PLAN:  ===========  Comfort measures prn   Sleep medication with morphine and vistaril   Prophylactic antibiotic for + GBS status when in labor   Anticipate   Reevaluate in 2-4 hours/PRN     ELIZABETH Snyder CNM

## 2020-06-09 NOTE — PROVIDER NOTIFICATION
06/09/20 0100   Provider Notification   Provider Name/Title JYOTI Reynoso   Method of Notification Phone   Notification Reason Other   Provider called to discuss ordering a Covid test. Provider was also ok with holding Pen G dose.

## 2020-06-09 NOTE — DISCHARGE INSTRUCTIONS
Discharge Instruction for Undelivered Patients      You were seen for: Labor Assessment  We Consulted: midwife Angeles  You had (Test or Medicine):Morphine, phenergan, and fluid bolus     Diet:   Drink 8 to 12 glasses of liquids (milk, juice, water) every day.  You may eat meals and snacks.     Activity:  Call your doctor or nurse midwife if your baby is moving less than usual.     Call your provider if you notice:  Swelling in your face or increased swelling in your hands or legs.  Headaches that are not relieved by Tylenol (acetaminophen).  Changes in your vision (blurring: seeing spots or stars.)  Nausea (sick to your stomach) and vomiting (throwing up).   Weight gain of 5 pounds or more per week.  Heartburn that doesn't go away.  Signs of bladder infection: pain when you urinate (use the toilet), need to go more often and more urgently.  The bag of sainz (rupture of membranes) breaks, or you notice leaking in your underwear.  Bright red blood in your underwear.  Abdominal (lower belly) or stomach pain.  For first baby: Contractions (tightening) less than 5 minutes apart for one hour or more.  Second (plus) baby: Contractions (tightening) less than 10 minutes apart and getting stronger.  *If less than 34 weeks: Contractions (tightenings) more than 6 times in one hour.  Increase or change in vaginal discharge (note the color and amount)    Follow-up:  As scheduled in the clinic

## 2020-06-09 NOTE — PROVIDER NOTIFICATION
06/09/20 0336   Provider Notification   Provider Name/Title Tamika Reynoso   Method of Notification Electronic Page   Notification Reason Other (Comment)   Text page verbatim: Can you put in an order for another 500ml bolus? I started it around 0300 for minimal variability not resolved w/ repositioning and emptying of bladder. Need to scan under new order.

## 2020-06-09 NOTE — PLAN OF CARE
Data: Patient presented to [facility] at .   Reason for maternal/fetal assessment per patient is Rule Out Labor  .  Patient is a . Prenatal record reviewed.      OB History    Para Term  AB Living   1 0 0 0 0 0   SAB TAB Ectopic Multiple Live Births   0 0 0 0 0      # Outcome Date GA Lbr Earl/2nd Weight Sex Delivery Anes PTL Lv   1 Current            . Medical history: History reviewed. No pertinent past medical history.. Gestational Age 39w1d. VSS. Fetal movement present. Patient denies cramping, backache, vaginal discharge, pelvic pressure, UTI symptoms, GI problems, bloody show, vaginal bleeding, edema, headache, visual disturbances, epigastric or URQ pain, abdominal pain, rupture of membranes. Support persons [place] present.  Action: Verbal consent for EFM. Triage assessment completed. EFM applied for FHR. Uterine assessment for uterine activity. Fetal assessment: Presumed adequate fetal oxygenation documented (see flow record).   Response: LEONA Reynoso CNM informed of patient arrival. Plan per provider is admit for pain control and therapeutic sleep. Patient verbalized agreement with plan. Patient transferred to room 458 ambulatory, oriented to room and call light.

## 2020-06-09 NOTE — TELEPHONE ENCOUNTER
"Patient calling reporting she is having contractions that are less than a minute apart for the past 2 hours. Denies feeling like she needs to push. Reports having some vaginal spotting. Informed patient RN will page on call midwife per [facility] protocol to call patient directly for second level triage.     Paged on-call provider VASU VELASQUEZ CNM, for [facility] at 7:58pm via smartweb to call patient directly at [phone].    Advised patient to call back if has not recieved a call from provider within 20 minutes.    Cory Durand RN  [facility]       Reason for Disposition    [1] First baby (primipara) AND [2] contractions < 6 minutes apart  AND [3] present 2 hours    Additional Information    Negative: Passed out (i.e., lost consciousness, collapsed and was not responding)    Negative: Shock suspected (e.g., cold/pale/clammy skin, too weak to stand, low BP, rapid pulse)    Negative: Difficult to awaken or acting confused (e.g., disoriented, slurred speech)    Negative: [1] SEVERE abdominal pain (e.g., excruciating) AND [2] constant AND [3] present > 1 hour    Negative: Severe bleeding (e.g., continuous red blood from vagina, or large blood clots)    Negative: Umbilical cord hanging out of the vagina (shiny, white, curled appearance, \"like telephone cord\")    Negative: Uncontrollable urge to push (i.e., feels like baby is coming out now)    Negative: Can see baby    Negative: Sounds like a life-threatening emergency to the triager    Protocols used: PREGNANCY - LABOR-A-AH      "

## 2020-06-09 NOTE — H&P
ADMIT NOTE  =================  39w1d  Marielena Rodriguez is a 24 year old female     with an Patient's last menstrual period was 2019 (exact date). and Estimated Date of Delivery: 2020 [facility] 2020 at 10:26 PM in in early labor.   Fetal movement- active  ROM- no   GBS- positive     HPI  ================  Patient was here this morning for rule out labor. Her contractions have strengthened throughout the day and have increased in intensity. This morning she was 1/70/-2 intact. She denies water leaking or vaginal bleeding. Good fetal movement. No other questions or concerns at this time.   FOB- is involved, Mamdou  Other labor support- no concerns     PRENATAL COURSE  =================  Prenatal course was   complicated by    Patient Active Problem List    Diagnosis Date Noted     Labor and delivery, indication for care 2020     Priority: Medium     Encounter for supervision of normal first pregnancy, unspecified trimester 2019     Priority: Medium     Nausea and vomiting in pregnancy prior to 22 weeks gestation 2019     Priority: Medium     Bilateral thoracic back pain 2019     Priority: Medium        HISTORIES  ============  No Known Allergies  No past medical history on file.  Past Surgical History:   Procedure Laterality Date     NO HISTORY OF SURGERY  12/10/2018   .  Family History   Problem Relation Age of Onset     No Known Problems Mother      No Known Problems Father      Diabetes Maternal Grandmother      Social History     Tobacco Use     Smoking status: Never Smoker     Smokeless tobacco: Never Used   Substance Use Topics     Alcohol use: No     OB History    Para Term  AB Living   1 0 0 0 0 0   SAB TAB Ectopic Multiple Live Births   0 0 0 0 0      # Outcome Date GA Lbr Earl/2nd Weight Sex Delivery Anes PTL Lv   1 Current                 LABS:   ===========  Rhogam not indicated  Lab Results   Component Value Date    ABO O  11/12/2019       Lab Results   Component Value Date    RH Pos 11/12/2019     No results found for: RUBELLAABIGG   Anti-Treponemia: negative   No results found for: HIV  Lab Results   Component Value Date    HGB 13.2 05/18/2020      Lab Results   Component Value Date    HEPBANG Nonreactive 11/12/2019     Lab Results   Component Value Date    GBS Positive 05/18/2020     1 hr GCT= 96    other labs- none     ROS  =========  Pt denies significant respiratory, cardiovacular, GI, or muscular/skeletalcomplaints.    See RN data base ROS.     PHYSICAL EXAM:  ===============  Last menstrual period 09/08/2019, not currently breastfeeding.  General appearance: uncomfortable with contractions  Heart: RRR without murmur  Lungs: clear to auscultation   Neuro: denies headache and visual disturbances  Psych: Mentation normal and bright   Legs: 2+/2+, no clonus, no edema      Abdomen: gravid, single vertex fetus, non-tender between contractions.  EFW-  7 lbs 10 oz.   CONTACTIONS: Contractions every 5-6 minutes.  Palpate: moderate  FETAL HEART TONES: baseline 145 with moderate FHR variability and  pos accelerations. No decelerations present.      PELVIC EXAM: 1/ 70/ Posterior/ soft/ -2   STEVE SCORE: 6  BLOODY SHOW: no   ROM: No  FLUID: none  ROM Plus: not done    ASSESSMENT:  ==============  IUP @ 39w1d in in early labor versus prodromal labor   NST REACTIVE  Fetal Heart rate tracing category one  GBS- positive     PLAN:  ===========  Admit - see IP orders  Theraputic sleep with IM morphine and phenergan  IV fluid bolus      ELIZABETH Snyder CNM

## 2020-06-09 NOTE — DISCHARGE SUMMARY
S: Patient is doing well. She feels like she rested well last night. She is comfortable this morning and looks very different from yesterday. We discussed her contractions. They are spaced out and not as frequent on the monitor. She agrees with that, they are more infrequent and mild. We discussed going home. She feels good about that plan. We discussed expectations over the next day or few days. First would be she goes into labor today and returns, second is she does not go into labor for a few more days or week, and third she continues to have this uncomfortable prodromal labor and does not go into labor. We discussed if the third option happens we can consider helping her labor along so she is not uncomfortable all the time without labor. They understand. Discussed signs and symptoms to watch for. Fetal heart tracing last night category 1 tracing with a few times of sleepiness from baby. Most likely due to morphine. Baby is more active this morning with accelerations. No decelerations during their time here. Marielena reports good fetal movement.     O:  Blood pressure 99/62, temperature 98  F (36.7  C), temperature source Oral, resp. rate 16, last menstrual period 09/08/2019, not currently breastfeeding.  General appearance: comfortable    CONTACTIONS: Contractions irregular.  Palpate: mild  FETAL HEART TONES: baseline 125 with moderate FHR variability and    accelerations yes. Decelerations no.    NST: REACTIVE  ROM: not ruptured  PELVIC EXAM: PELVIC EXAM: 1/ 70/ Posterior/ soft/ -2   Fetal Position: cephalic   Bloody show: No  Pitocin- none,  Antibiotics- none  Cervical ripening: N/A    ASSESSMENT:  ==============  IUP @ 39w2d Prodromal labor    Fetal Heart rate tracing category one  GBS- positive     PLAN:  ===========  Discharge to home.   Discharge instructions reviewed with patient.   Knows number to call when/if labor picks up again today.   If not delivered, prenatal appointment at the end of the  week.    Shabana Reynoso, ELIZABETH MICHAELM

## 2020-06-09 NOTE — PLAN OF CARE
VSS. Denies LOF. Pink bleeding and scant mucus present. Morphine and phenergan given. Plan is to start IV and give fluid bolus, promote sleep overnight. Continue to monitor.

## 2020-06-10 ENCOUNTER — ANESTHESIA EVENT (OUTPATIENT)
Dept: OBGYN | Facility: CLINIC | Age: 25
End: 2020-06-10
Payer: COMMERCIAL

## 2020-06-10 ENCOUNTER — NURSE TRIAGE (OUTPATIENT)
Dept: NURSING | Facility: CLINIC | Age: 25
End: 2020-06-10

## 2020-06-10 ENCOUNTER — ANESTHESIA (OUTPATIENT)
Dept: OBGYN | Facility: CLINIC | Age: 25
End: 2020-06-10
Payer: COMMERCIAL

## 2020-06-10 ENCOUNTER — HOSPITAL ENCOUNTER (INPATIENT)
Facility: CLINIC | Age: 25
LOS: 2 days | Discharge: HOME | End: 2020-06-12
Attending: ADVANCED PRACTICE MIDWIFE | Admitting: ADVANCED PRACTICE MIDWIFE
Payer: COMMERCIAL

## 2020-06-10 PROBLEM — Z34.00 ENCOUNTER FOR SUPERVISION OF NORMAL FIRST PREGNANCY, UNSPECIFIED TRIMESTER: Status: ACTIVE | Noted: 2019-11-12

## 2020-06-10 PROBLEM — B95.1 GROUP B STREPTOCOCCUS URINARY TRACT INFECTION AFFECTING PREGNANCY IN THIRD TRIMESTER: Status: ACTIVE | Noted: 2020-06-10

## 2020-06-10 PROBLEM — O23.43 GROUP B STREPTOCOCCUS URINARY TRACT INFECTION AFFECTING PREGNANCY IN THIRD TRIMESTER: Status: ACTIVE | Noted: 2020-06-10

## 2020-06-10 LAB
ERYTHROCYTE [DISTWIDTH] IN BLOOD BY AUTOMATED COUNT: 13.9 % (ref 10–15)
HCT VFR BLD AUTO: 38.5 % (ref 35–47)
HGB BLD-MCNC: 13.2 G/DL (ref 11.7–15.7)
MCH RBC QN AUTO: 29.5 PG (ref 26.5–33)
MCHC RBC AUTO-ENTMCNC: 34.3 G/DL (ref 31.5–36.5)
MCV RBC AUTO: 86 FL (ref 78–100)
PLATELET # BLD AUTO: 220 10E9/L (ref 150–450)
RBC # BLD AUTO: 4.47 10E12/L (ref 3.8–5.2)
WBC # BLD AUTO: 13.8 10E9/L (ref 4–11)

## 2020-06-10 PROCEDURE — 25000125 ZZHC RX 250: Performed by: ADVANCED PRACTICE MIDWIFE

## 2020-06-10 PROCEDURE — 25000128 H RX IP 250 OP 636

## 2020-06-10 PROCEDURE — 00HU33Z INSERTION OF INFUSION DEVICE INTO SPINAL CANAL, PERCUTANEOUS APPROACH: ICD-10-PCS | Performed by: ANESTHESIOLOGY

## 2020-06-10 PROCEDURE — 25800030 ZZH RX IP 258 OP 636: Performed by: ADVANCED PRACTICE MIDWIFE

## 2020-06-10 PROCEDURE — 25000125 ZZHC RX 250: Performed by: ANESTHESIOLOGY

## 2020-06-10 PROCEDURE — 72200001 ZZH LABOR CARE VAGINAL DELIVERY SINGLE

## 2020-06-10 PROCEDURE — 25000132 ZZH RX MED GY IP 250 OP 250 PS 637: Performed by: ADVANCED PRACTICE MIDWIFE

## 2020-06-10 PROCEDURE — 12000001 ZZH R&B MED SURG/OB UMMC

## 2020-06-10 PROCEDURE — 25000128 H RX IP 250 OP 636: Performed by: STUDENT IN AN ORGANIZED HEALTH CARE EDUCATION/TRAINING PROGRAM

## 2020-06-10 PROCEDURE — 0KQM0ZZ REPAIR PERINEUM MUSCLE, OPEN APPROACH: ICD-10-PCS | Performed by: ADVANCED PRACTICE MIDWIFE

## 2020-06-10 PROCEDURE — 25000128 H RX IP 250 OP 636: Performed by: ADVANCED PRACTICE MIDWIFE

## 2020-06-10 PROCEDURE — 3E0R3BZ INTRODUCTION OF ANESTHETIC AGENT INTO SPINAL CANAL, PERCUTANEOUS APPROACH: ICD-10-PCS | Performed by: ANESTHESIOLOGY

## 2020-06-10 PROCEDURE — 25000132 ZZH RX MED GY IP 250 OP 250 PS 637

## 2020-06-10 PROCEDURE — 59410 OBSTETRICAL CARE: CPT | Performed by: ADVANCED PRACTICE MIDWIFE

## 2020-06-10 PROCEDURE — 85027 COMPLETE CBC AUTOMATED: CPT | Performed by: ADVANCED PRACTICE MIDWIFE

## 2020-06-10 RX ORDER — MODIFIED LANOLIN
OINTMENT (GRAM) TOPICAL
Status: DISCONTINUED | OUTPATIENT
Start: 2020-06-10 | End: 2020-06-12 | Stop reason: HOSPADM

## 2020-06-10 RX ORDER — MISOPROSTOL 200 UG/1
TABLET ORAL
Status: COMPLETED
Start: 2020-06-10 | End: 2020-06-10

## 2020-06-10 RX ORDER — OXYTOCIN/0.9 % SODIUM CHLORIDE 30/500 ML
340 PLASTIC BAG, INJECTION (ML) INTRAVENOUS CONTINUOUS PRN
Status: DISCONTINUED | OUTPATIENT
Start: 2020-06-10 | End: 2020-06-12 | Stop reason: HOSPADM

## 2020-06-10 RX ORDER — OXYTOCIN 10 [USP'U]/ML
10 INJECTION, SOLUTION INTRAMUSCULAR; INTRAVENOUS
Status: COMPLETED | OUTPATIENT
Start: 2020-06-10 | End: 2020-06-10

## 2020-06-10 RX ORDER — AMOXICILLIN 250 MG
2 CAPSULE ORAL 2 TIMES DAILY
Status: DISCONTINUED | OUTPATIENT
Start: 2020-06-10 | End: 2020-06-12 | Stop reason: HOSPADM

## 2020-06-10 RX ORDER — BISACODYL 10 MG
10 SUPPOSITORY, RECTAL RECTAL DAILY PRN
Status: DISCONTINUED | OUTPATIENT
Start: 2020-06-12 | End: 2020-06-12 | Stop reason: HOSPADM

## 2020-06-10 RX ORDER — NALOXONE HYDROCHLORIDE 0.4 MG/ML
.1-.4 INJECTION, SOLUTION INTRAMUSCULAR; INTRAVENOUS; SUBCUTANEOUS
Status: DISCONTINUED | OUTPATIENT
Start: 2020-06-10 | End: 2020-06-10

## 2020-06-10 RX ORDER — HYDROCORTISONE 2.5 %
CREAM (GRAM) TOPICAL 3 TIMES DAILY PRN
Status: DISCONTINUED | OUTPATIENT
Start: 2020-06-10 | End: 2020-06-12 | Stop reason: HOSPADM

## 2020-06-10 RX ORDER — SODIUM CHLORIDE, SODIUM LACTATE, POTASSIUM CHLORIDE, CALCIUM CHLORIDE 600; 310; 30; 20 MG/100ML; MG/100ML; MG/100ML; MG/100ML
INJECTION, SOLUTION INTRAVENOUS CONTINUOUS
Status: DISCONTINUED | OUTPATIENT
Start: 2020-06-10 | End: 2020-06-10

## 2020-06-10 RX ORDER — LIDOCAINE HYDROCHLORIDE 10 MG/ML
INJECTION, SOLUTION EPIDURAL; INFILTRATION; INTRACAUDAL; PERINEURAL PRN
OUTPATIENT
Start: 2020-06-10

## 2020-06-10 RX ORDER — CARBOPROST TROMETHAMINE 250 UG/ML
250 INJECTION, SOLUTION INTRAMUSCULAR
Status: DISCONTINUED | OUTPATIENT
Start: 2020-06-10 | End: 2020-06-10

## 2020-06-10 RX ORDER — EPHEDRINE SULFATE 50 MG/ML
5 INJECTION, SOLUTION INTRAMUSCULAR; INTRAVENOUS; SUBCUTANEOUS
Status: DISCONTINUED | OUTPATIENT
Start: 2020-06-10 | End: 2020-06-10

## 2020-06-10 RX ORDER — OXYTOCIN 10 [USP'U]/ML
10 INJECTION, SOLUTION INTRAMUSCULAR; INTRAVENOUS
Status: DISCONTINUED | OUTPATIENT
Start: 2020-06-10 | End: 2020-06-12 | Stop reason: HOSPADM

## 2020-06-10 RX ORDER — LIDOCAINE 40 MG/G
CREAM TOPICAL
Status: DISCONTINUED | OUTPATIENT
Start: 2020-06-10 | End: 2020-06-10

## 2020-06-10 RX ORDER — METHYLERGONOVINE MALEATE 0.2 MG/1
200 TABLET ORAL EVERY 6 HOURS SCHEDULED
Status: DISCONTINUED | OUTPATIENT
Start: 2020-06-10 | End: 2020-06-11

## 2020-06-10 RX ORDER — ONDANSETRON 2 MG/ML
4 INJECTION INTRAMUSCULAR; INTRAVENOUS EVERY 6 HOURS PRN
Status: DISCONTINUED | OUTPATIENT
Start: 2020-06-10 | End: 2020-06-10

## 2020-06-10 RX ORDER — IBUPROFEN 800 MG/1
800 TABLET, FILM COATED ORAL EVERY 6 HOURS PRN
Status: DISCONTINUED | OUTPATIENT
Start: 2020-06-10 | End: 2020-06-12 | Stop reason: HOSPADM

## 2020-06-10 RX ORDER — NALOXONE HYDROCHLORIDE 0.4 MG/ML
.1-.4 INJECTION, SOLUTION INTRAMUSCULAR; INTRAVENOUS; SUBCUTANEOUS
Status: DISCONTINUED | OUTPATIENT
Start: 2020-06-10 | End: 2020-06-12 | Stop reason: HOSPADM

## 2020-06-10 RX ORDER — NALBUPHINE HYDROCHLORIDE 20 MG/ML
2.5-5 INJECTION, SOLUTION INTRAMUSCULAR; INTRAVENOUS; SUBCUTANEOUS EVERY 6 HOURS PRN
Status: DISCONTINUED | OUTPATIENT
Start: 2020-06-10 | End: 2020-06-10

## 2020-06-10 RX ORDER — OXYTOCIN/0.9 % SODIUM CHLORIDE 30/500 ML
1-24 PLASTIC BAG, INJECTION (ML) INTRAVENOUS CONTINUOUS
Status: DISCONTINUED | OUTPATIENT
Start: 2020-06-10 | End: 2020-06-10

## 2020-06-10 RX ORDER — FENTANYL CITRATE 50 UG/ML
50 INJECTION, SOLUTION INTRAMUSCULAR; INTRAVENOUS ONCE
Status: COMPLETED | OUTPATIENT
Start: 2020-06-10 | End: 2020-06-10

## 2020-06-10 RX ORDER — OXYTOCIN 10 [USP'U]/ML
INJECTION, SOLUTION INTRAMUSCULAR; INTRAVENOUS
Status: DISCONTINUED
Start: 2020-06-10 | End: 2020-06-10 | Stop reason: HOSPADM

## 2020-06-10 RX ORDER — LIDOCAINE HYDROCHLORIDE AND EPINEPHRINE 15; 5 MG/ML; UG/ML
INJECTION, SOLUTION EPIDURAL PRN
OUTPATIENT
Start: 2020-06-10

## 2020-06-10 RX ORDER — LIDOCAINE HYDROCHLORIDE 10 MG/ML
INJECTION, SOLUTION EPIDURAL; INFILTRATION; INTRACAUDAL; PERINEURAL
Status: DISCONTINUED
Start: 2020-06-10 | End: 2020-06-10 | Stop reason: HOSPADM

## 2020-06-10 RX ORDER — METHYLERGONOVINE MALEATE 0.2 MG/ML
200 INJECTION INTRAVENOUS
Status: COMPLETED | OUTPATIENT
Start: 2020-06-10 | End: 2020-06-10

## 2020-06-10 RX ORDER — ACETAMINOPHEN 325 MG/1
650 TABLET ORAL EVERY 4 HOURS PRN
Status: DISCONTINUED | OUTPATIENT
Start: 2020-06-10 | End: 2020-06-12 | Stop reason: HOSPADM

## 2020-06-10 RX ORDER — AMOXICILLIN 250 MG
1 CAPSULE ORAL 2 TIMES DAILY
Status: DISCONTINUED | OUTPATIENT
Start: 2020-06-10 | End: 2020-06-12 | Stop reason: HOSPADM

## 2020-06-10 RX ORDER — OXYTOCIN/0.9 % SODIUM CHLORIDE 30/500 ML
100 PLASTIC BAG, INJECTION (ML) INTRAVENOUS CONTINUOUS
Status: DISCONTINUED | OUTPATIENT
Start: 2020-06-10 | End: 2020-06-12 | Stop reason: HOSPADM

## 2020-06-10 RX ORDER — PENICILLIN G POTASSIUM 5000000 [IU]/1
5 INJECTION, POWDER, FOR SOLUTION INTRAMUSCULAR; INTRAVENOUS ONCE
Status: COMPLETED | OUTPATIENT
Start: 2020-06-10 | End: 2020-06-10

## 2020-06-10 RX ORDER — IBUPROFEN 800 MG/1
800 TABLET, FILM COATED ORAL
Status: DISCONTINUED | OUTPATIENT
Start: 2020-06-10 | End: 2020-06-10

## 2020-06-10 RX ORDER — FENTANYL CITRATE 50 UG/ML
INJECTION, SOLUTION INTRAMUSCULAR; INTRAVENOUS
Status: COMPLETED
Start: 2020-06-10 | End: 2020-06-10

## 2020-06-10 RX ORDER — ACETAMINOPHEN 325 MG/1
650 TABLET ORAL EVERY 4 HOURS PRN
Status: DISCONTINUED | OUTPATIENT
Start: 2020-06-10 | End: 2020-06-10

## 2020-06-10 RX ORDER — OXYTOCIN/0.9 % SODIUM CHLORIDE 30/500 ML
100-340 PLASTIC BAG, INJECTION (ML) INTRAVENOUS CONTINUOUS PRN
Status: COMPLETED | OUTPATIENT
Start: 2020-06-10 | End: 2020-06-10

## 2020-06-10 RX ORDER — OXYCODONE AND ACETAMINOPHEN 5; 325 MG/1; MG/1
1 TABLET ORAL
Status: DISCONTINUED | OUTPATIENT
Start: 2020-06-10 | End: 2020-06-10

## 2020-06-10 RX ADMIN — Medication 10 ML/HR: at 04:40

## 2020-06-10 RX ADMIN — SODIUM CHLORIDE 2.5 MILLION UNITS: 9 INJECTION, SOLUTION INTRAVENOUS at 07:40

## 2020-06-10 RX ADMIN — LIDOCAINE HYDROCHLORIDE,EPINEPHRINE BITARTRATE 3 ML: 15; .005 INJECTION, SOLUTION EPIDURAL; INFILTRATION; INTRACAUDAL; PERINEURAL at 04:30

## 2020-06-10 RX ADMIN — Medication 2 MILLI-UNITS/MIN: at 08:04

## 2020-06-10 RX ADMIN — Medication: at 17:27

## 2020-06-10 RX ADMIN — LIDOCAINE HYDROCHLORIDE 2 ML: 10 INJECTION, SOLUTION EPIDURAL; INFILTRATION; INTRACAUDAL; PERINEURAL at 04:34

## 2020-06-10 RX ADMIN — SODIUM CHLORIDE, POTASSIUM CHLORIDE, SODIUM LACTATE AND CALCIUM CHLORIDE: 600; 310; 30; 20 INJECTION, SOLUTION INTRAVENOUS at 05:06

## 2020-06-10 RX ADMIN — SODIUM CHLORIDE 2.5 MILLION UNITS: 9 INJECTION, SOLUTION INTRAVENOUS at 11:41

## 2020-06-10 RX ADMIN — SODIUM CHLORIDE, POTASSIUM CHLORIDE, SODIUM LACTATE AND CALCIUM CHLORIDE: 600; 310; 30; 20 INJECTION, SOLUTION INTRAVENOUS at 03:43

## 2020-06-10 RX ADMIN — LIDOCAINE HYDROCHLORIDE,EPINEPHRINE BITARTRATE 2 ML: 15; .005 INJECTION, SOLUTION EPIDURAL; INFILTRATION; INTRACAUDAL; PERINEURAL at 04:32

## 2020-06-10 RX ADMIN — SODIUM CHLORIDE 2.5 MILLION UNITS: 9 INJECTION, SOLUTION INTRAVENOUS at 16:06

## 2020-06-10 RX ADMIN — PENICILLIN G POTASSIUM 5 MILLION UNITS: 5000000 POWDER, FOR SOLUTION INTRAMUSCULAR; INTRAPLEURAL; INTRATHECAL; INTRAVENOUS at 03:44

## 2020-06-10 RX ADMIN — Medication 340 ML/HR: at 18:15

## 2020-06-10 RX ADMIN — METHYLERGONOVINE MALEATE 200 MCG: 0.2 INJECTION INTRAVENOUS at 18:24

## 2020-06-10 RX ADMIN — Medication: at 04:37

## 2020-06-10 RX ADMIN — SODIUM CHLORIDE, POTASSIUM CHLORIDE, SODIUM LACTATE AND CALCIUM CHLORIDE: 600; 310; 30; 20 INJECTION, SOLUTION INTRAVENOUS at 14:53

## 2020-06-10 RX ADMIN — LIDOCAINE HYDROCHLORIDE 20 ML: 10 INJECTION, SOLUTION EPIDURAL; INFILTRATION; INTRACAUDAL; PERINEURAL at 18:26

## 2020-06-10 RX ADMIN — FENTANYL CITRATE 50 MCG: 50 INJECTION, SOLUTION INTRAMUSCULAR; INTRAVENOUS at 14:02

## 2020-06-10 RX ADMIN — OXYTOCIN 10 UNITS: 10 INJECTION, SOLUTION INTRAMUSCULAR; INTRAVENOUS at 18:23

## 2020-06-10 RX ADMIN — MISOPROSTOL 400 MCG: 200 TABLET ORAL at 18:20

## 2020-06-10 RX ADMIN — IBUPROFEN 800 MG: 800 TABLET, FILM COATED ORAL at 19:28

## 2020-06-10 RX ADMIN — Medication: at 11:33

## 2020-06-10 RX ADMIN — FENTANYL CITRATE 50 MCG: 50 INJECTION, SOLUTION INTRAMUSCULAR; INTRAVENOUS at 14:24

## 2020-06-10 NOTE — PROGRESS NOTES
Page from RN  S; pt in tub and felt 'pop' likely rupture of membranes and also large emesis  Patient would like to remain in tub for pain relief  O:  See flow sheet for intermittent doptone  /64   Temp 98.5  F (36.9  C) (Oral)   Resp 18   LMP 2019 (Exact Date)   Breastfeeding No   Patient coping well in tub at this time.  A; early labor   Stable fetus  + GBS status  P;  Will start IV and begin antibiotics for + GBS prophylaxis.  Pt can remain in tub for pain relief.  Anticipate   Karen Ahumada CNM

## 2020-06-10 NOTE — PLAN OF CARE
Labor Shift Note  Data: EFM see flowsheet.   Vitals:    06/10/20 0500 06/10/20 0505 06/10/20 0545 06/10/20 0615   BP: 118/70 119/68 103/68 109/68   Resp: 18 18 18 18   Temp:   97.9  F (36.6  C)    TempSrc:   Oral    SpO2: 94% 98% 97% 97%   . I/O this shift:  In: -   Out: 150 [Urine:150].  Leaking moderate amounts of yellow fluid.  Signs and symptoms of infection absent.  Blood pressures WDL. Signs and symptoms of pre-eclampsia: absent.  Support person  present.  Interventions: Continue uterine/fetal assessment continuous. Vital Signs per order set. Comfort measures epidural.  Medicated for Epidural.  Plan: Anticipate . Provide labor/coping assistance as needed by patient and support person.  Observe for and notify care provider of indications of progressing labor, need for pain medications,  or signs of fetal/maternal compromise.

## 2020-06-10 NOTE — ANESTHESIA PROCEDURE NOTES
Epidural Procedure Note  Staff -   Anesthesiologist:  Olinda Toro MD  Resident/Fellow: Olive Mancuso MD    Performed By: anesthesiologist  Procedure performed by resident/CRNA in presence of a teaching physician.          Location: OB     Procedure start time:  6/10/2020 4:25 AM     Procedure end time:  6/10/2020 4:32 AM   Pre-procedure checklist:   patient identified, IV checked, site marked, risks and benefits discussed, informed consent, monitors and equipment checked, pre-op evaluation and at physician/surgeon's request      Correct Patient: Yes      Correct Position: Yes      Correct Site: Yes      Correct Procedure: Yes      Correct Laterality:  N/A    Site Marked:  N/A  Procedure:     Procedure:  Epidural catheter    ASA:  2    Diagnosis:  Labor    Position:  Sitting    Sterile Prep: chloraprep      Insertion site:  L3-4    Local skin infiltration:  1% lidocaine    Approach:  Midline    Needle gauge (G):  17    Needle Length (in):  3.5    Block Needle Type:  Touhy    Injection Technique:  LORT saline    LARRY at (cm):  5    Attempts:  1    Redirects:  0    Catheter gauge (G):  19    Catheter threaded easily: Yes      Threaded to cm at skin:  9.5    Threaded in epidural space (cm):  4.5    Paresthesias:  No    Aspiration negative for Heme or CSF: Yes       Local anesthetic:  Lidocaine 1.5% w/ 1:200,000 epinephrine    Test dose time:  04:30    Test dose negative for signs of intravascular, subdural or intrathecal injection: Yes    Assessment/Narrative:      Patient tolerated well. 1 attempt, placed without issues.

## 2020-06-10 NOTE — PROGRESS NOTES
SUBJECTIVE: having some pain in the lower back but lateral a window that is not getting coverage.  Is not typical back pain of OP position and does not feel OP by exam.    OBJECTIVE:  General appearance:  healthy, alert and mild distress.     comfortable      Blood pressure 97/54, pulse 96, temperature 98  F (36.7  C), temperature source Oral, resp. rate 18, last menstrual period 2019, SpO2 97 %, not currently breastfeeding.    FETAL HEART TONES: baseline 130 .  moderate FHRV variability.  No late decelerations bradycardia or marked variable decelerations noted.    CONTACTIONS: Contractions every 2-3 minutes.  Palpate: moderate  Pitocin- 7 mu/min.,    GBS- positive     Antibiotics- PCN             PELVIC EXAM: 9/ 100/ Mid/ soft/ 0   ROM: light meconium fluid      ASSESSMENT:  ==============  IUP @ 39w3d  Diagnosis active labor     Labor:  augmented  Category I fetal heart rate tracing.        PLAN:  ===========  Pain medication via epidural.  Anesthesia called for assessment of pain and potential bolus of epidural.  Prophylactic antibiotic for + GBS status  Anticipate        Russell Otero, APRN CNM

## 2020-06-10 NOTE — PLAN OF CARE
Patient's labor is progressing fairly. Vitals signs are stable. Leaking yellow fluid. Has epidural for pain control, Oxytocin for labor. Anticipate .

## 2020-06-10 NOTE — PROGRESS NOTES
Fetal Non-Stress Test Results    NST Ordered By: Karen Ahumada CNM        NST Start & Stop Times   6/10/2020 start 0220  6/10/2020 end 0240          NST Results  Fetus A   Baseline Rate: 130  Accelerations: Present  Decelerations: None  Interpretation: reactive  Karen Ahumada CNM

## 2020-06-10 NOTE — ANESTHESIA PREPROCEDURE EVALUATION
"Anesthesia Pre-Procedure Evaluation    Patient: Marielena Rodriguez   MRN:     [id number] Gender:   female   Age:    24 year old :      1995        Preoperative Diagnosis: * No pre-op diagnosis entered *   * No procedures listed *     LABS:  CBC:   Lab Results   Component Value Date    WBC 12.5 (H) 2020    WBC Canceled, Test credited, specimen discarded 2019    HGB 12.9 2020    HGB 13.2 2020    HCT 37.8 2020    HCT Canceled, Test credited, specimen discarded 2019     2020    PLT Canceled, Test credited, specimen discarded 2019     BMP:   Lab Results   Component Value Date     2019     10/31/2019    POTASSIUM 3.1 (L) 2019    POTASSIUM 3.3 (L) 10/31/2019    CHLORIDE 100 2019    CHLORIDE 109 10/31/2019    CO2 23 2019    CO2 23 10/31/2019    BUN 15 2019    BUN 8 10/31/2019    CR 0.51 (L) 2019    CR 0.53 10/31/2019    GLC 90 2019    GLC 71 10/31/2019     COAGS: No results found for: PTT, INR, FIBR  POC:   Lab Results   Component Value Date    HCG Negative 10/04/2018     OTHER:   Lab Results   Component Value Date    SNOW 10.5 (H) 2019        Preop Vitals    BP Readings from Last 3 Encounters:   06/10/20 122/64   20 95/57   20 103/60    Pulse Readings from Last 3 Encounters:   20 84   20 104   20 120      Resp Readings from Last 3 Encounters:   06/10/20 18   20 16   20 18    SpO2 Readings from Last 3 Encounters:   19 100%   10/31/19 98%   19 99%      Temp Readings from Last 1 Encounters:   06/10/20 36.9  C (98.5  F) (Oral)    Ht Readings from Last 1 Encounters:   20 1.676 m (5' 6\")      Wt Readings from Last 1 Encounters:   20 79.5 kg (175 lb 3.2 oz)    Estimated body mass index is 28.28 kg/m  as calculated from the following:    Height as of 20: 1.676 m (5' 6\").    Weight as of 20: 79.5 kg (175 lb 3.2 oz).     LDA:  Peripheral IV " 11/18/19 Right (Active)   Number of days: 205       Peripheral IV 06/10/20 Right Hand (Active)   Number of days: 0       Intrathecal/Epidural Catheter 06/10/20 (Active)   Number of days: 0        History reviewed. No pertinent past medical history.   Past Surgical History:   Procedure Laterality Date     NO HISTORY OF SURGERY  12/10/2018      No Known Allergies     Anesthesia Evaluation       history and physical reviewed . Pt has not had prior anesthetic            ROS/MED HX    ENT/Pulmonary:  - neg pulmonary ROS     Neurologic:  - neg neurologic ROS     Cardiovascular:  - neg cardiovascular ROS       METS/Exercise Tolerance:  >4 METS   Hematologic:  - neg hematologic  ROS       Musculoskeletal:  - neg musculoskeletal ROS       GI/Hepatic:  - neg GI/hepatic ROS       Renal/Genitourinary:  - ROS Renal section negative       Endo:  - neg endo ROS       Psychiatric:  - neg psychiatric ROS       Infectious Disease:  - neg infectious disease ROS       Malignancy:      - no malignancy   Other:    (+) Possibly pregnant C-spine cleared: N/A, no H/O Chronic Pain,                       PHYSICAL EXAM:   Mental Status/Neuro: A/A/O   Airway: Facies: Feasible  Mallampati: II  Mouth/Opening: Full  TM distance: > 6 cm  Neck ROM: Full   Respiratory: Auscultation: CTAB     Resp. Rate: Normal     Resp. Effort: Normal      CV: Rhythm: Regular  Rate: Age appropriate  Heart: Normal Sounds  Edema: None   Comments:      Dental: Normal Dentition                Assessment:   ASA SCORE: 2            PONV Management: Adult Risk Factors: Female             neg OB EHSAN Mancuso MD

## 2020-06-10 NOTE — PROGRESS NOTES
SUBJECTIVE:  Comfortable with epidural.  Some upper thorasic discomfort that is more due to underlying scoliosis than labor except of being in bed for last few days with prodromal labor.      OBJECTIVE:  General appearance:  healthy, alert and no distress.     comfortable      Blood pressure 102/67, pulse 96, temperature 98.3  F (36.8  C), temperature source Oral, resp. rate 18, last menstrual period 2019, SpO2 98 %, not currently breastfeeding.    FETAL HEART TONES: baseline 120 .  moderate FHRV variability.  No late decelerations bradycardia or marked variable decelerations noted.    CONTACTIONS: Contractions every 3 minutes since 1000.  Palpate: moderate  Pitocin- 7 mu/min.,    GBS- positive     Antibiotics- PCN  Dose number 3 infusing             PELVIC EXAM: 5/ 100/ Mid/ soft/ 0  With molding noted and head well applied to cervix.    ROM: light meconium fluid      ASSESSMENT:  ==============  IUP @ 39w3d  Diagnosis active labor with augmentation x 1 hour.     Labor:  augmented  Category I fetal heart rate tracing.        PLAN:  ===========  comfort measures prn   Pain medication via epidural  Prophylactic antibiotic for + GBS status  Anticipate   reevaluate in 2 hours/PRN     Russell Otero, APRN CNM

## 2020-06-10 NOTE — PROGRESS NOTES
SUBJECTIVE:  Some more pressure.    OBJECTIVE:  General appearance:  healthy, alert, mild distress and cooperative.     Comfortable overall with epidural but beginning to push.    Fetal head noted at time of straight cath so pushing started.      Blood pressure 97/54, pulse 96, temperature 98.2  F (36.8  C), temperature source Oral, resp. rate 18, last menstrual period 2019, SpO2 97 %, not currently breastfeeding.    FETAL HEART TONES: baseline 130 .  moderate FHRV variability.  No late decelerations bradycardia or marked variable decelerations noted.    CONTACTIONS: Contractions every 3-4 minutes.  Palpate: moderate  Pitocin- 9 mu/min.,    GBS- positive     Antibiotics- PCN   Adequate treatment             PELVIC EXAM: 10/ 100/ Anterior/ soft/ +1   ROM: light meconium fluid      ASSESSMENT:  ==============  IUP @ 39w3d  Diagnosis second stage labor at 1615     Labor:  augmented  Category I fetal heart rate tracing.        PLAN:  ===========  Active pushing of pt with assistance.    Anticipate TADEO Otero, APRN CNM

## 2020-06-10 NOTE — H&P
"  Marielena Rodriguez is a 24 year old female    Estimated Date of Delivery: Jun 14, 2020 is calculated from  Patient's last menstrual period was 09/08/2019 (exact date). [facility] at 2:52 AM  in in latent labor labor.   Membranes are intact    Labor start time 1900.    PRENATAL COURSE  Prenatal care began at 9 wks gestation for a total of 10 prenatal  visits.  Total wt gain 15 lbs   Prenatal vital signs WNL  Medications in pregnancy   Current Facility-Administered Medications:      acetaminophen (TYLENOL) tablet 650 mg, 650 mg, Oral, Q4H PRN, Karen Ahumada APRN CNM     carboprost (HEMABATE) injection 250 mcg, 250 mcg, Intramuscular, Once PRN, Karen Ahumada APRN CNM     ibuprofen (ADVIL/MOTRIN) tablet 800 mg, 800 mg, Oral, Once PRN, Karen Ahumada APRN CNJOSSUE     lactated ringers BOLUS 1,000 mL, 1,000 mL, Intravenous, Once PRN **OR** lactated ringers BOLUS 500 mL, 500 mL, Intravenous, Once PRN, Karen Ahumada APRN CNM     lactated ringers BOLUS 500 mL, 500 mL, Intravenous, Once PRN, Karen Ahumada APRN CNJOSSUE     lidocaine 1 % 0.1-20 mL, 0.1-20 mL, Subcutaneous, Once PRN, Karen Ahumada APRN CNJOSSUE     Medication Instructions: misoprostol (CYTOTEC)- Nurse to discuss ordering with provider, if needed. Ordered via \"OB misoprostol (CYTOTEC) Postpartum Hemorrhage PANEL\", , Does not apply, Continuous PRN, Karen Ahumada APRN CNJOSSUE     methylergonovine (METHERGINE) injection 200 mcg, 200 mcg, Intramuscular, Once PRN, Karen Ahumada APRN CNM     naloxone (NARCAN) injection 0.1-0.4 mg, 0.1-0.4 mg, Intravenous, Q2 Min PRN, Karen Ahumada APRN CNM     ondansetron (ZOFRAN) injection 4 mg, 4 mg, Intravenous, Q6H PRN, Karen Ahumada APRN CNM     oxyCODONE-acetaminophen (PERCOCET) 5-325 MG per tablet 1 tablet, 1 tablet, Oral, Once PRN, Karen Ahumada APRN CNM     oxytocin (PITOCIN) 30 units in 500 mL 0.9% NaCl infusion, 100-340 mL/hr, Intravenous, Continuous PRN, " Karen Ahumada APRN CNM     oxytocin (PITOCIN) injection 10 Units, 10 Units, Intramuscular, Once PRN, Karen Ahumada APRN CNM     penicillin G potassium 5 million units vial to attach to  mL bag, 5 Million Units, Intravenous, Once **FOLLOWED BY** penicillin G potassium injection 2.5 Million Units, 2.5 Million Units, Intravenous, Q4H, Karen Ahumada APRN CNM     tranexamic acid (CYKLOKAPRON) bolus 1 g vial attach to NaCl 50 or 100 mL bag ADULT, 1 g, Intravenous, Q30 Min PRN, Karen Ahumada APRN CNM  Prenatal course was   complicated by    Patient Active Problem List    Diagnosis Date Noted     Normal labor and delivery 06/10/2020     Priority: Medium     Labor and delivery, indication for care 2020     Priority: Medium     Encounter for supervision of normal first pregnancy, unspecified trimester 2019     Priority: Medium     Nausea and vomiting in pregnancy prior to 22 weeks gestation 2019     Priority: Medium     Bilateral thoracic back pain 2019     Priority: Medium       HISTORIES  No Known Allergies  History reviewed. No pertinent past medical history.  Past Surgical History:   Procedure Laterality Date     NO HISTORY OF SURGERY  12/10/2018     Family History   Problem Relation Age of Onset     No Known Problems Mother      No Known Problems Father      Diabetes Maternal Grandmother      Social History     Tobacco Use     Smoking status: Never Smoker     Smokeless tobacco: Never Used   Substance Use Topics     Alcohol use: No     OB History    Para Term  AB Living   1 0 0 0 0 0   SAB TAB Ectopic Multiple Live Births   0 0 0 0 0      # Outcome Date GA Lbr Earl/2nd Weight Sex Delivery Anes PTL Lv   1 Current                LABS:    Blood Type O positive   Rhogam n/a   Rubella immune  RPR non reactive   HIV non reactive   HEP non reactive   GCT 96  Lab Results   Component Value Date    HGB 12.9 2020      No results found for: PAP  GBS positive      ROS  C: NEGATIVE for fever, chills, change in weight  I: NEGATIVE for worrisome rashes, moles or lesions  E: NEGATIVE for vision changes or irritation  E/M: NEGATIVE for ear, mouth and throat problems  R: NEGATIVE for significant cough or SOB  B: NEGATIVE for masses, tenderness or discharge  CV: NEGATIVE for chest pain, palpitations or peripheral edema  GI: NEGATIVE for nausea, abdominal pain, heartburn, or change in bowel habits  : NEGATIVE for frequency, dysuria, or hematuria  E: NEGATIVE for temperature intolerance, skin/hair changes    PHYSICAL EXAM:  Vital signs stable, afebrile and BP is /64   Temp 98.5  F (36.9  C) (Oral)   Resp 18   LMP 2019 (Exact Date)   Breastfeeding No     General appearance healthy, alert, active, moderate distress and cooperative  Heart: RRR without murmur  Lungs:  clear to auscultationr   Neuro:  denies headache and visual disturbances  Psych: Mentation normal and bright   Legs: 2+/2+, no clonus, no edema       Abdomen: gravid, single vertex fetus, non-tender, EFW 7 1/2 lbs. Pt  is yesenia every 2-4 minutes, lasting  seconds and palpates moderate    FETAL HEART TONES: baseline 130 with moderate FHRV variability and  accelerations.  No decelerations present.     PELVIC EXAM: 3/ 90/ Mid/ soft/ -1  STEVE SCORE:    BLOODY SHOW:: no  Membranes as listed above    ASSESSMENT:  IUP @ 39w3d in in early labor with prolonged latent phase  NST  reactive  CST not done  Fetal-maternal status reassuring  GBS positive     PLAN:  Admit - see IP orders  [facility] with strong contractions and was given therapeutic sleep, 2nd night up with contractions.    Will start antibiotics when in active labor and draw CBC with platelets only, patient has had ABO within last 24 hours and is negative for Covid within the last 24 hours.  Prophylactic antibiotic for + GBS status  May discuss with patient epidural for pain relief  Anticipate   Karen  Brandyn, CNM

## 2020-06-10 NOTE — PROGRESS NOTES
SUBJECTIVE: Pt is very comfortable with epidural in place since 0430.  No complaints    OBJECTIVE:  General appearance:  healthy, alert and no distress.     Comfortable with epidural.    Pt has been managed as an outpt over the last few days for prolonged latent phase of labor treated with analgesia.  Pt returned in labor this early am and progressed to 5 cm while using hydrotherapy and desired an epidural.      Since epidural the pat ctx have been 5-6 minutes with SROM so discussed with pt and  the use of Pitocin augmentation due to uterine fatigue and increased risk for PPH.  Agree with plan of care.      Blood pressure 101/59, temperature 98.2  F (36.8  C), temperature source Oral, resp. rate 18, last menstrual period 09/08/2019, SpO2 97 %, not currently breastfeeding.    FETAL HEART TONES: baseline 120 .  moderate FHRV variability.  No late decelerations bradycardia or marked variable decelerations noted.    CONTACTIONS: Contractions every 5 minutes.  Palpate: moderate   Pitocin- none,    GBS- positive     Antibiotics- PCN             PELVIC EXAM:  Deferred.     ROM: moderate meconium fluid     ASSESSMENT:  ==============  IUP @ 39w3d.    Diagnosis minimal/no progress.   Prolonged prodromal labor.      Labor:  Augmented as of this assessment.  Category I fetal heart rate tracing.   Baseline is 120 with accels to 150     PLAN:  ===========  Pain medication via epidural  Prophylactic antibiotic for + GBS status. Adequately treated with 2 doses.  Cont q 4 until delivery.    Labor augmentation with Pitocin  reevaluate in 2-4 hours/PRN after achieving adequate labor post epidural of ctx q 3 minutes    Russell Otero, APRN CNM

## 2020-06-10 NOTE — PROGRESS NOTES
RN paged  Patient requesting epidural.     As planned patient may have epidural.  IV penicillin started   As planned patient to start IV antibiotics due to + GBS  Meconium stained fluid noted after gush of fluid  Patient will be put on continuous monitoring due to epidural placement.  Anticipate .  Karen Ahumada CNM

## 2020-06-10 NOTE — TELEPHONE ENCOUNTER
"Patient is pregnant at 39 weeks, 3 days. Primary provider is [facility]. Patient plans to deliver at [facility]. This is patient's first baby. Reports she has had contractions for a few days. Was discharged from the hospital on 6/9 in the morning. Reports her contractions started again at 5 pm on 6/9. Reports severe abdominal pain since 5 pm. States the pain is not stopping at all. Advised per protocol that she should call 911. Patient declined stating \"I can just go in\"     RN paged on call provider for [facility], Karen Ahumada CNM via smart web at 12:47 am to call patient back directly at [phone].    RN advised patient to phone back nurse line in 20 minutes if no response from on call CNM and patient agreed.    Purvi Velazquez RN/[facility]    Reason for Disposition    [1] SEVERE abdominal pain (e.g., excruciating) AND [2] constant AND [3] present > 1 hour    Additional Information    Negative: Passed out (i.e., lost consciousness, collapsed and was not responding)    Negative: Shock suspected (e.g., cold/pale/clammy skin, too weak to stand, low BP, rapid pulse)    Negative: Difficult to awaken or acting confused (e.g., disoriented, slurred speech)    Protocols used: PREGNANCY - LABOR-A-AH      "

## 2020-06-10 NOTE — PROGRESS NOTES
Blood pressure 97/54, pulse 96, temperature 98.3  F (36.8  C), temperature source Oral, resp. rate 16, last menstrual period 09/08/2019, SpO2 98 %, not currently breastfeeding.  Patient Vitals for the past 24 hrs:   BP Temp Temp src Pulse Resp SpO2   06/10/20 1634 -- 98.3  F (36.8  C) Oral -- 16 98 %   06/10/20 1455 -- 98.2  F (36.8  C) Oral -- 18 --   06/10/20 1344 -- 98  F (36.7  C) Oral -- -- --   06/10/20 1140 97/54 -- -- -- -- 97 %   06/10/20 1139 -- 98.2  F (36.8  C) Oral -- 18 --   06/10/20 1040 107/66 98.2  F (36.8  C) Oral -- 18 98 %   06/10/20 0913 -- 98.3  F (36.8  C) Oral -- 18 --   06/10/20 0840 102/67 -- -- -- 18 98 %   06/10/20 0740 101/59 98.2  F (36.8  C) Oral 96 18 --   06/10/20 0615 109/68 -- -- -- 18 97 %   06/10/20 0545 103/68 97.9  F (36.6  C) Oral -- 18 97 %   06/10/20 0505 119/68 -- -- -- 18 98 %   06/10/20 0500 118/70 -- -- -- 18 94 %   06/10/20 0450 123/75 -- -- -- 18 96 %   06/10/20 0446 107/59 98.1  F (36.7  C) Oral -- 18 --   06/10/20 0444 117/73 -- -- -- 18 --   06/10/20 0442 109/67 -- -- -- 18 100 %   06/10/20 0440 111/64 -- -- -- 18 --   06/10/20 0438 108/64 -- -- -- 18 --   06/10/20 0437 110/69 -- -- -- 18 --   06/10/20 0434 109/70 -- -- -- 18 --   06/10/20 0425 111/68 -- -- -- 18 --   06/10/20 0149 122/64 98.5  F (36.9  C) Oral -- 18 --     General appearance: comfortable  With epidural pushing with contractions well   CONTACTIONS: every 3-5 minutes  Pitocin- 9 mu/min.,  Antibiotics- PCN  FETAL HEART TONES: continuous EFM- baseline 140 with moderate variability and positive accelerations. Early  Decelerations with contractions   ROM: moderate meconium fluid  PELVIC EXAM:complete pushing for 1 hour +1 station,   # Pain Assessment:  Current Pain Score 6/10/2020   Patient currently in pain? yes   Pain score (0-10) -   Pain descriptors -   - Marielena is experiencing pain due to labor . Pain management was discussed with Marielena and her significant other and the plan was created in a  collaborative fashion.  Marielena's response to the current recommendations: compliant  - patient is comfortable with epidural       ASSESSMENT:  ==============  IUP @ 39w3d second stage   Pitocin augementation  Epidural  Meconium fluid  Fetal Heart Rate Tracing category one  GBS- positive- adequately treated  Patient Active Problem List   Diagnosis     Bilateral thoracic back pain     Encounter for supervision of normal first pregnancy, unspecified trimester     Nausea and vomiting in pregnancy prior to 22 weeks gestation     Labor and delivery, indication for care     Normal labor and delivery     Group B Streptococcus urinary tract infection affecting pregnancy in third trimester     PLAN:  ===========  Begin pushing 1615  Labor augmentation with Pitocin reviewed with pt. Agreeable to plan.   Will continue with efforts  To push   Anticipate   ELIZABETH CainM

## 2020-06-10 NOTE — PROGRESS NOTES
Blood pressure 123/75, temperature 98.1  F (36.7  C), temperature source Oral, resp. rate 18, last menstrual period 09/08/2019, SpO2 96 %, not currently breastfeeding.  Patient Vitals for the past 24 hrs:   BP Temp Temp src Resp SpO2   06/10/20 0450 123/75 -- -- 18 96 %   06/10/20 0446 107/59 98.1  F (36.7  C) Oral 18 --   06/10/20 0444 117/73 -- -- 18 --   06/10/20 0442 109/67 -- -- 18 100 %   06/10/20 0440 111/64 -- -- 18 --   06/10/20 0438 108/64 -- -- 18 --   06/10/20 0437 110/69 -- -- 18 --   06/10/20 0434 109/70 -- -- 18 --   06/10/20 0425 111/68 -- -- 18 --   06/10/20 0149 122/64 98.5  F (36.9  C) Oral 18 --     General appearance: comfortable  With epidural   CONTACTIONS: every 4-5 minutes  Pitocin- none,  Antibiotics- PCN  FETAL HEART TONES: continuous EFM- baseline 140 with moderate variability and positive accelerations. No decelerations.  ROM: moderate meconium fluid noted at about 30 minutes after SROM  PELVIC EXAM:5/ 90%/ Mid/ soft/ 0   # Pain Assessment:  Current Pain Score 6/10/2020   Patient currently in pain? yes   Pain score (0-10) -   Pain descriptors -   - Marielena is experiencing pain due to labor . Pain management was discussed with Marielena and her significant other and the plan was created in a collaborative fashion.  Marielena's response to the current recommendations: compliant  - patient had epidural placed at ~0430      ASSESSMENT:  ==============  IUP @ 39w3d in active labor   Meconium stained fluid  Fetal Heart Rate Tracing category one  GBS- positive- antibiotics started  Patient Active Problem List   Diagnosis     Bilateral thoracic back pain     Encounter for supervision of normal first pregnancy, unspecified trimester     Nausea and vomiting in pregnancy prior to 22 weeks gestation     Labor and delivery, indication for care     Normal labor and delivery     PLAN:  ===========  Frequent position changes to facilitate labor with epidural anesthesia.  Pain medication- epidural placed  with the last hour.   Encouraged patient to sleep and will do frequent position changes   Continue prophylactic IV antibiotic PCN for positive GBS status.   May need pitocin augmentation  Anticipate   ELIZABETH CainM

## 2020-06-10 NOTE — PLAN OF CARE
Data: Patient presented to [facility] at 0252.   Reason for maternal/fetal assessment per patient is Contractions  .  Patient is a . Prenatal record reviewed.      OB History    Para Term  AB Living   1 0 0 0 0 0   SAB TAB Ectopic Multiple Live Births   0 0 0 0 0      # Outcome Date GA Lbr Earl/2nd Weight Sex Delivery Anes PTL Lv   1 Current            . Medical history: History reviewed. No pertinent past medical history.. Gestational Age 39w3d. VSS. Fetal movement present. Patient denies cramping, backache, vaginal discharge, pelvic pressure, UTI symptoms, GI problems, bloody show, vaginal bleeding, edema, headache, visual disturbances, epigastric or URQ pain, abdominal pain, rupture of membranes. Support persons Aguibou present.  Action: Verbal consent for EFM. Triage assessment completed. EFM (see flow record).   Response: JOSSUE Ahumada CNM informed of arrival. Plan per provider is admit. Patient verbalized agreement with plan. Patient transferred to room 444 ambulatory, oriented to room and call light.

## 2020-06-11 LAB — HGB BLD-MCNC: 11.6 G/DL (ref 11.7–15.7)

## 2020-06-11 PROCEDURE — 12000001 ZZH R&B MED SURG/OB UMMC

## 2020-06-11 PROCEDURE — 36415 COLL VENOUS BLD VENIPUNCTURE: CPT | Performed by: ADVANCED PRACTICE MIDWIFE

## 2020-06-11 PROCEDURE — 85018 HEMOGLOBIN: CPT | Performed by: ADVANCED PRACTICE MIDWIFE

## 2020-06-11 PROCEDURE — 25000132 ZZH RX MED GY IP 250 OP 250 PS 637: Performed by: ADVANCED PRACTICE MIDWIFE

## 2020-06-11 RX ORDER — AMOXICILLIN 250 MG
1 CAPSULE ORAL DAILY
Qty: 100 TABLET | Refills: 0 | Status: SHIPPED | OUTPATIENT
Start: 2020-06-11 | End: 2021-10-05

## 2020-06-11 RX ORDER — ACETAMINOPHEN 325 MG/1
650 TABLET ORAL EVERY 6 HOURS PRN
Qty: 100 TABLET | Refills: 0 | Status: SHIPPED | OUTPATIENT
Start: 2020-06-11 | End: 2021-10-05

## 2020-06-11 RX ORDER — IBUPROFEN 600 MG/1
600 TABLET, FILM COATED ORAL EVERY 6 HOURS PRN
Qty: 60 TABLET | Refills: 0 | Status: SHIPPED | OUTPATIENT
Start: 2020-06-11 | End: 2021-10-05

## 2020-06-11 RX ADMIN — IBUPROFEN 800 MG: 800 TABLET, FILM COATED ORAL at 12:47

## 2020-06-11 RX ADMIN — ACETAMINOPHEN 650 MG: 325 TABLET, FILM COATED ORAL at 12:47

## 2020-06-11 RX ADMIN — METHYLERGONOVINE MALEATE 200 MCG: 0.2 TABLET ORAL at 00:49

## 2020-06-11 RX ADMIN — ACETAMINOPHEN 650 MG: 325 TABLET, FILM COATED ORAL at 21:32

## 2020-06-11 RX ADMIN — IBUPROFEN 800 MG: 800 TABLET, FILM COATED ORAL at 04:55

## 2020-06-11 RX ADMIN — METHYLERGONOVINE MALEATE 200 MCG: 0.2 TABLET ORAL at 06:11

## 2020-06-11 RX ADMIN — IBUPROFEN 800 MG: 800 TABLET, FILM COATED ORAL at 21:32

## 2020-06-11 RX ADMIN — DOCUSATE SODIUM 50 MG AND SENNOSIDES 8.6 MG 1 TABLET: 8.6; 5 TABLET, FILM COATED ORAL at 12:47

## 2020-06-11 NOTE — PROGRESS NOTES
Patient arrived to [facility] unit via wheelchair at 2015,with belongings, accompanied by spouse/ significant other, with infant in arms. Received report from CHLOE Bello and checked bands. Unit and room orientation started. Call light within arms reach; no concerns present at this time. Continue with plan of care.

## 2020-06-11 NOTE — PLAN OF CARE
Patient's labor progressed fairly. FHR and uterine activity see flow sheet. Patient was found complete at 1615. Started pushing at 1623. Midwife was at bedside. A viable baby girl was born at 1811 and NICU attended delivery but baby was crying spontaneously.  . Patient started bleeding after the placenta came out. Rectal miso, IM pitocin, IV pitocin, and IM methergine were all given. Fundus firm U/1 and scant bleeding. Both mom and infant are recovering well. Anticipate transfer to [facility].

## 2020-06-11 NOTE — PLAN OF CARE
VSS. Postpartum assessment WDL. Up ad juan carlos. Denies signs and symptoms of preeclampsia. Trying to stay away from pain meds but agreed to one dose of Ibuprofen - states cramping is comfortably manageable. Provided education on basic infant care, breastfeeding, normal  behavior, and expected physiological changes. Spouse present, supportive with cares. Continue plan of care.

## 2020-06-11 NOTE — PLAN OF CARE
"Vss, postpartum assessment WDL. Taking tylenol and ibuprofen for pain control. Educated patient this morning about formula feeding infant, patient said \"my breasts are dry\" and wanted infant to receive formula. Discussed formula feeding and how it can interfere with breast milk production. Patient was still interested in giving formula. Patient is also breast feeding infant, assistance given with position and latch. Football hold worked best for latch. Patient took a tub soak and is using ice and tucks to perineum. Lochia has been light this shift. Continue with plan of care.  "

## 2020-06-11 NOTE — PROGRESS NOTES
Post Partum Note  SIGNIFICANT PROBLEMS:  Patient Active Problem List    Diagnosis Date Noted     Normal labor and delivery 06/10/2020     Priority: Medium     Group B Streptococcus urinary tract infection affecting pregnancy in third trimester 06/10/2020     Priority: Medium     Adequately treated in labor with PCN.       Labor and delivery, indication for care 06/08/2020     Priority: Medium     Encounter for supervision of normal first pregnancy, unspecified trimester 11/12/2019     Priority: Medium     Nausea and vomiting in pregnancy prior to 22 weeks gestation 11/12/2019     Priority: Medium     Bilateral thoracic back pain 01/25/2019     Priority: Medium       INTERVAL HISTORY:  BP (!) 88/59   Pulse 78   Temp 98.1  F (36.7  C) (Oral)   Resp 16   LMP 09/08/2019 (Exact Date)   SpO2 98%   Breastfeeding Unknown   Pt stable, baby is rooming in  Breast feeding status:initiated  Complications since 2 hours post delivery: PPH of 600. Bleeding Scant. Oral methergine discontinued.  Patient is tolerating acitivity well Voiding without difficulty, cramping is relieved by Ibuprophen, lochia is decreasing and patient denies clots.  Perineal pain is is relieved by Ibuprophen.  The perineum laceration is well approximated. Patient was sleepy through the night and declined to BF her baby more frequently than q4 hours. Choosing to suplement with bottle. Infant has not had good feeds and has not stooled yet. Patient and her  are very loving toward baby and bonding well, just tired from 3 day labor.       Postpartum hemoglobin   Hemoglobin   Date Value Ref Range Status   06/11/2020 11.6 (L) 11.7 - 15.7 g/dL Final     Blood type   Lab Results   Component Value Date    ABO O 06/09/2020       Lab Results   Component Value Date    RH Pos 06/09/2020     Rubella status No results found for: RUBELLAABIGG  History of depression: no    ASSESSMENT/PLAN:  Normal postpartum exam   Stable Post-partum day  #1  Complications:breastfeeding difficulties and PPH  Plan d/c home tomorrow  RTC 6 weeks  Teaching done: D/C Instructions: Nutrition/Activity, Engorgement Management, Birth Control Options, Warning Signs/When to Call: Excessive Bleeding, Infection, PP Depression, Kegals and Crunches, RTC Clinic for PP Appointment and PNV  Birthcontrol planned:Undecided. Fertility and contraception options reviewed.  Current Discharge Medication List      CONTINUE these medications which have NOT CHANGED    Details   Prenatal Vit-Fe Fumarate-FA (PRENATAL MULTIVITAMIN W/IRON) 27-0.8 MG tablet Take 1 tablet by mouth daily  Qty: 90 tablet, Refills: 3    Associated Diagnoses: Encounter for supervision of normal first pregnancy, third trimester           Letty Sandhu CNM, ELIZABETH Sandhu CNM, ELIZABETH MONTES,  ALECM

## 2020-06-11 NOTE — L&D DELIVERY NOTE
Delivery Summary    Marielena Rodriguez MRN# [id number]   Age: 24 year old YOB: 1995     ASSESSMENT & PLAN:     Delivery Note    IUP at 39 weeks gestation delivered on 6/10/20.     delivery of a viable 6 pounds 8 ounces Female infant.  Apgars of 8 at 1 minute and 9 at 5 minutes.  Labor was augmented.  Medications administered  in labor:  Pain Rx theraputic sleep  and Epidural; Antibiotics Yes: PCn adequately treated;   Perineum: 2nd degree  Placenta-mechanism: spontaneous, intact,  with a 3 vessel cord. IV oxytocin was given. IM oxytocin was given. Rectal misoprostol was given. IM methergine  Estimated Blood Loss was 650 cc.  Complications of pregnancy , labor and delivery: Dysfunctional labor and prolonged latent stage, prolonged active stage, postpartum hemorrhage  Birth attendants: Karen Ahumada CNM         Labor Event Times    Labor onset date:  6/10/20 Onset time:   4:30 AM   Dilation complete date:  6/10/20 Complete time:   4:15 PM   Start pushing date/time:  6/10/2020 1623      Labor Events    Labor Type:  Spontaneous  Predominate monitoring during 1st stage:  continuous electronic fetal monitoring     Antibiotics received during labor?:  Yes     Rupture identifier:  Sac 1  Rupture date/time: 6/10/20 0315   Rupture type:  Spontaneous rupture of membranes occuring during spontaneous labor or augmentation  Fluid color:  Meconium  Fluid odor:  Normal     1:1 continuous labor support provided by?:  RN       Delivery/Placenta Date and Time    Delivery Date:  6/10/20 Delivery Time:   6:11 PM   Placenta Date/Time:  6/10/2020  6:15 PM  Oxytocin given at the time of delivery:  after delivery of baby     Vaginal Counts     Initial count performed by 2 team members:   Two Team Members   Tara Ahumada       Needles Suture [place] Sponges Instruments   Initial counts 2 0 5    Added to count  1 5    Final counts 2 1 10    Placed during labor Accounted for at the end of labor   No NA   No NA    No NA    Final count performed by 2 team members:   Two Team Members   Tara Ahumada      Final count correct?:  Yes     Apgars    Living status:  Living   1 Minute 5 Minute 10 Minute 15 Minute 20 Minute   Skin color: 1  1       Heart rate: 2  2       Reflex irritability: 2  2       Muscle tone: 2  2       Respiratory effort: 1  2       Total: 8  9       Apgars assigned by:  MICAELA HOLDEN RN     Cord    Vessels:  3 Vessels Complications:  None   Cord Blood Disposition:  Lab Gases Sent?:  No       Resuscitation    Methods:  None   Care at Delivery:  NICU Team in attendance of  of female  with spontaneous cry, pink to mother's abdomen. Dried and stimulated with warm blankets for further lusty cry. NICU Team left room, delayed cord clamping, hat applied and moved to mother's chest, skin to skin. Mother and baby in stable condition.     Skin to Skin and Feeding Plan    Skin to skin initiation date/time: 1841    Skin to skin with:  Mother  Skin to skin end date/time:        Labor Events and Shoulder Dystocia    Fetal Tracing Prior to Delivery:  Category 1  Shoulder dystocia present?:  Neg     Delivery (Maternal) (Provider to Complete) ([id number])    Episiotomy:  None  Perineal lacerations:  2nd Repaired?:  Yes      Blood Loss  Mother: Marielena Rodriguez #[id number]   Start of Mother's Information    IO Blood Loss  06/10/20 0430 - 06/10/20 2034    Delivery QBL (mL) Hospital Encounter 650 mL    Total  650 mL         End of Mother's Information  Mother: Marielena Rodriguez #[id number]         Delivery - Provider to Complete ([id number])    Delivering clinician:  Karen Ahumada APRN CNJOSSUE  CNM Care:  Exclusive CNM care in labor  Attempted Delivery Types (Choose all that apply):  Spontaneous Vaginal Delivery  Delivery Type (Choose the 1 that will go to the Birth History):  Vaginal, Spontaneous          Placenta    Immediate Cord Clamping:  Done  Date/Time:  6/10/2020  6:15  PM  Removal:  Spontaneous  Disposition:  Hospital disposal     Anesthesia    Method:  Epidural, Local          Presentation and Position    Presentation:  Vertex  Position:  Left Occiput Anterior           ELIZABETH CainM

## 2020-06-11 NOTE — PLAN OF CARE
Data: Marielena Rodriguez transferred to 7122 via wheelchair at 2015. Baby transferred via parent's arms.  Action: Receiving unit notified of transfer: Yes. Patient and family notified of room change. Report given to Liliana PHILLIPS RN at 2020. Belongings sent to receiving unit. Accompanied by Registered Nurse. Oriented patient to surroundings. Call light within reach. ID bands double-checked with receiving RN.  Response: Patient tolerated transfer and is stable.

## 2020-06-11 NOTE — PLAN OF CARE
Pt up in room independently. Taking ibuprofen & tylenol for perineal discomfort. Breastfeeding baby with minimal to no staff assist. Bonding well with baby.

## 2020-06-12 VITALS
RESPIRATION RATE: 17 BRPM | HEART RATE: 76 BPM | DIASTOLIC BLOOD PRESSURE: 57 MMHG | OXYGEN SATURATION: 98 % | TEMPERATURE: 97.6 F | SYSTOLIC BLOOD PRESSURE: 91 MMHG

## 2020-06-12 PROCEDURE — 25000132 ZZH RX MED GY IP 250 OP 250 PS 637: Performed by: ADVANCED PRACTICE MIDWIFE

## 2020-06-12 PROCEDURE — 90715 TDAP VACCINE 7 YRS/> IM: CPT | Performed by: ADVANCED PRACTICE MIDWIFE

## 2020-06-12 PROCEDURE — 25000128 H RX IP 250 OP 636: Performed by: ADVANCED PRACTICE MIDWIFE

## 2020-06-12 RX ADMIN — ACETAMINOPHEN 650 MG: 325 TABLET, FILM COATED ORAL at 02:52

## 2020-06-12 RX ADMIN — IBUPROFEN 800 MG: 800 TABLET, FILM COATED ORAL at 02:52

## 2020-06-12 RX ADMIN — CLOSTRIDIUM TETANI TOXOID ANTIGEN (FORMALDEHYDE INACTIVATED), CORYNEBACTERIUM DIPHTHERIAE TOXOID ANTIGEN (FORMALDEHYDE INACTIVATED), BORDETELLA PERTUSSIS TOXOID ANTIGEN (GLUTARALDEHYDE INACTIVATED), BORDETELLA PERTUSSIS FILAMENTOUS HEMAGGLUTININ ANTIGEN (FORMALDEHYDE INACTIVATED), BORDETELLA PERTUSSIS PERTACTIN ANTIGEN, AND BORDETELLA PERTUSSIS FIMBRIAE 2/3 ANTIGEN 0.5 ML: 5; 2; 2.5; 5; 3; 5 INJECTION, SUSPENSION INTRAMUSCULAR at 12:57

## 2020-06-12 NOTE — PLAN OF CARE
Patient vital signs and postpartum assessment within normal limits. Pain managed with tylenol and ibuprofen. Using ice on perineum. Patient is up ambulating. Voiding. Performing self cares and is caring for infant. Breastfeeding well on cue. Continue with plan of care

## 2020-06-12 NOTE — PLAN OF CARE
VSS. LS CTA. Pt denies headache, dizziness or changes to vision. Breasts are soft, nipples intact. BS+, flatus+, BM-. Patient declined taking stool softeners this evening. Pt is voiding independently and with out difficulty. FF@ U/U, midline. Perineum is healing, ice applied. Scant amount of rubra lochia. Pt denies pain in legs, no clonus or edema. Pt c/o uterine cramping pain. Patient is taking ibuprofen and Tylenol to manage her pain. Patient is loving towards her , is attentive to  needs and is active in  cares. FOB is supportive and at bedside.

## 2020-06-12 NOTE — PROGRESS NOTES
Post Partum Note    Marielena Rodriguez      MRN#: [id number]  Age: 24 year old      YOB: 1995      Post-partum day #2    SIGNIFICANT PROBLEMS:  Patient Active Problem List    Diagnosis Date Noted     Normal labor and delivery 06/10/2020     Priority: Medium     Group B Streptococcus urinary tract infection affecting pregnancy in third trimester 06/10/2020     Priority: Medium     Adequately treated in labor with PCN.       Labor and delivery, indication for care 2020     Priority: Medium     Encounter for supervision of normal first pregnancy, unspecified trimester 2019     Priority: Medium     Nausea and vomiting in pregnancy prior to 22 weeks gestation 2019     Priority: Medium     Bilateral thoracic back pain 2019     Priority: Medium       INTERVAL HISTORY:  BP 90/51   Pulse 84   Temp 98.4  F (36.9  C) (Oral)   Resp 17   LMP 2019 (Exact Date)   SpO2 98%   Breastfeeding Unknown     Pt stable, baby is rooming in  Breast feeding status:initiated and breastfeeding with formula supplementation per patient request  Complications since 2 hours post delivery: None  Patient is tolerating acitivity well Voiding without difficulty, cramping is relieved by Ibuprophen, lochia is decreasing and patient denies clots.  Perineal pain is is relieved by Ibuprophen.  The perineum laceration is well approximated    Normal postpartum exam   BP 90/51   Pulse 84   Temp 98.4  F (36.9  C) (Oral)   Resp 17   LMP 2019 (Exact Date)   SpO2 98%   Breastfeeding Unknown   Reflexes +2 no clonus no edema  Sitting up attempting to breastfeed.    Postpartum hemoglobin   Hemoglobin   Date Value Ref Range Status   2020 11.6 (L) 11.7 - 15.7 g/dL Final     Blood type   Lab Results   Component Value Date    ABO O 2020       Lab Results   Component Value Date    RH Pos 2020     Rubella status immune     ASSESSMENT/PLAN:  Stable Post-partum day  #2  Complications:none  Plan d/c home today  RTC 6 weeks  Teaching done: D/C Instructions: Nutrition/Activity, Engorgement Management, Birth Control Options, Warning Signs/When to Call: Excessive Bleeding, Infection, PP Depression, Kegals and Crunches, RTC Clinic for PP Appointment and PNV    Postpartum warning s/s reviewed, including bleeding/clots, fever, mastitis, or depression    Birthcontrol planned:uncertain will consider and discuss at 6 weeks postpartum  Current Discharge Medication List      START taking these medications    Details   acetaminophen (TYLENOL) 325 MG tablet Take 2 tablets (650 mg) by mouth every 6 hours as needed for mild pain Start after Delivery.  Qty: 100 tablet, Refills: 0    Associated Diagnoses:  (normal spontaneous vaginal delivery)      ibuprofen (ADVIL/MOTRIN) 600 MG tablet Take 1 tablet (600 mg) by mouth every 6 hours as needed for moderate pain Start after delivery  Qty: 60 tablet, Refills: 0    Associated Diagnoses:  (normal spontaneous vaginal delivery)      senna-docusate (SENOKOT-S/PERICOLACE) 8.6-50 MG tablet Take 1 tablet by mouth daily Start after delivery.  Qty: 100 tablet, Refills: 0    Associated Diagnoses:  (normal spontaneous vaginal delivery)         CONTINUE these medications which have NOT CHANGED    Details   Prenatal Vit-Fe Fumarate-FA (PRENATAL MULTIVITAMIN W/IRON) 27-0.8 MG tablet Take 1 tablet by mouth daily  Qty: 90 tablet, Refills: 3    Associated Diagnoses: Encounter for supervision of normal first pregnancy, third trimester                 All teaching done, enc. Abdominal crunches and kegals, reviewed warning signs of mastitis, postpartum depression and postpartum. Discussed birth control briefly and pt and  will decide and she will make appt at 6 weeks postpartum.  Discharge home.  Karen Ahumada CNM

## 2020-07-23 ENCOUNTER — PRENATAL OFFICE VISIT (OUTPATIENT)
Dept: MIDWIFE SERVICES | Facility: CLINIC | Age: 25
End: 2020-07-23
Payer: COMMERCIAL

## 2020-07-23 VITALS
TEMPERATURE: 97.7 F | BODY MASS INDEX: 24.27 KG/M2 | SYSTOLIC BLOOD PRESSURE: 100 MMHG | DIASTOLIC BLOOD PRESSURE: 69 MMHG | WEIGHT: 151 LBS | HEART RATE: 63 BPM | HEIGHT: 66 IN

## 2020-07-23 PROCEDURE — 99207 ZZC POST PARTUM EXAM: CPT | Performed by: ADVANCED PRACTICE MIDWIFE

## 2020-07-23 ASSESSMENT — PATIENT HEALTH QUESTIONNAIRE - PHQ9: SUM OF ALL RESPONSES TO PHQ QUESTIONS 1-9: 3

## 2020-07-23 ASSESSMENT — MIFFLIN-ST. JEOR: SCORE: 1461.68

## 2020-07-23 NOTE — PROGRESS NOTES
Marielena is here for a 6-week postpartum checkup.    She had a  of a viable girl, weight 6 pounds 8 oz., with Hemorrhage of 650ml. Date of delivery was 6/10/20. Since delivery, she has been breast feeding and supplementing with bottle feeding.  She has no signs of infection, bleeding or other complications.  She is not pregnant.  We discussed contraceptions and she is unsure. She has had Nexplanon in the past and thinks she will have this option again but does not want it placed today. Will schedule appointment. Discussed return to fertility and encouraged to use condoms.  Discussed that she is due for Pap smear and reviewed screening for cervical cancer. She is nervous for speculum and Pap smear, but understands indication. Agrees to schedule with Nexplanon placement.    Post partum tubal: No  Type of Delivery:  Vaginal  Feeding Method:  Breast and formula      REVIEW OF SYSTEMS:  Ears/Nose/Throat: negative  Respiratory: negative  Cardiovascular: negative  Gastrointestinal: negative  Genitourinary: negative  Musculoskeletal: negative    Neurologic: negative   Skin: negative   Endocrine:  negative  Vagina: negative  Cervix: negative  Breasts: negative  Vulva: negative  Episiotomy: negative  Contraception Plan: unsure  Medical History Reviewed yes -  Family History Reviewed yes  Problem List Updated yes     EXAM:    HEENT: grossly normal.  NECK: no lymphadenopathy or thyroidomegaly.  LUNGS: CTA X 2, no rales or crackles.  BACK: No spinal or CVA tenderness.  HEART: RRR without murmurs clicks or gallops.  ABDOMEN: soft, non tender, good bowel sounds, without masses   rebound, guarding or tenderness.  PELVIC: deferred  EXTREMITIES:  warm to touch, good pulses, no ankle edema or calf tenderness.  NEUROLOGIC: grossly normal.    ASSESSMENT:   6-week postpartum exam after .    PLAN: Return to clinic for Nexplanon placement and Pap smear    ELIZABETH Dooley CNM

## 2020-08-13 ENCOUNTER — MEDICAL CORRESPONDENCE (OUTPATIENT)
Dept: HEALTH INFORMATION MANAGEMENT | Facility: CLINIC | Age: 25
End: 2020-08-13

## 2020-10-15 ENCOUNTER — MEDICAL CORRESPONDENCE (OUTPATIENT)
Dept: HEALTH INFORMATION MANAGEMENT | Facility: CLINIC | Age: 25
End: 2020-10-15

## 2020-12-16 ENCOUNTER — MEDICAL CORRESPONDENCE (OUTPATIENT)
Dept: HEALTH INFORMATION MANAGEMENT | Facility: CLINIC | Age: 25
End: 2020-12-16

## 2021-01-03 ENCOUNTER — HEALTH MAINTENANCE LETTER (OUTPATIENT)
Age: 26
End: 2021-01-03

## 2021-05-24 ENCOUNTER — IMMUNIZATION (OUTPATIENT)
Dept: LAB | Facility: CLINIC | Age: 26
End: 2021-05-24

## 2021-10-05 ENCOUNTER — PRENATAL OFFICE VISIT (OUTPATIENT)
Dept: NURSING | Facility: CLINIC | Age: 26
End: 2021-10-05
Payer: COMMERCIAL

## 2021-10-05 ENCOUNTER — TRANSCRIBE ORDERS (OUTPATIENT)
Dept: MATERNAL FETAL MEDICINE | Facility: CLINIC | Age: 26
End: 2021-10-05

## 2021-10-05 VITALS — HEIGHT: 66 IN | BODY MASS INDEX: 26.52 KG/M2 | WEIGHT: 165 LBS

## 2021-10-05 DIAGNOSIS — Z34.80 ENCOUNTER FOR SUPERVISION OF NORMAL PREGNANCY IN MULTIGRAVIDA: Primary | ICD-10-CM

## 2021-10-05 DIAGNOSIS — Z23 NEED FOR TDAP VACCINATION: ICD-10-CM

## 2021-10-05 DIAGNOSIS — O26.90 PREGNANCY RELATED CONDITION, ANTEPARTUM: Primary | ICD-10-CM

## 2021-10-05 PROCEDURE — 99207 PR NO CHARGE NURSE ONLY: CPT

## 2021-10-05 ASSESSMENT — MIFFLIN-ST. JEOR: SCORE: 1515.19

## 2021-10-05 NOTE — PROGRESS NOTES
Important Information for Provider:     New ob nurse intake by phone, second pregnancy. Recent pregnancy 6/10/2020. Handouts reviewed. TE sent  to [facility] to send prenatal vitamins, B6, Unisom to updated pharmacy. Ordered genetic screening Ultrasound and NOB 11/01/2021    Prenatal OB Questionnaire  Patient supplied answers from flow sheet for:  Prenatal OB Questionnaire.  Past Medical History  Have you ever recieved care for your mental health? : No  Have you ever been in a major accident or suffered serious trauma?: No  Within the last year, has anyone hit, slapped, kicked or otherwise hurt you?: No  In the last year, has anyone forced you to have sex when you didn't want to?: No    Past Medical History 2   Have you ever received a blood transfusion?: No  Would you accept a blood transfusion if was medically recommended?: Yes  Does anyone in your home smoke?: No   Is your blood type Rh negative?: No  Have you ever ?: (!) Yes  Have you been hospitalized for a nonsurgical reason excluding normal delivery?: No  Have you ever had an abnormal pap smear?: No    Past Medical History (Continued)  Do you have a history of abnormalities of the uterus?: No  Did your mother take JUDI or any other hormones when she was pregnant with you?: No  Do you have any other problems we have not asked about which you feel may be important to this pregnancy?: No              Allergies as of 10/5/2021:    Allergies as of 10/05/2021     (No Known Allergies)       Current medications are:  No current outpatient medications on file.         Early ultrasound screening tool:    Does patient have irregular periods?  No  Did patient use hormonal birth control in the three months prior to positive urine pregnancy test? No  Is the patient breastfeeding?  No  Is the patient 10 weeks or greater at time of education visit?  No

## 2021-10-10 ENCOUNTER — HEALTH MAINTENANCE LETTER (OUTPATIENT)
Age: 26
End: 2021-10-10

## 2021-10-13 ENCOUNTER — HOSPITAL ENCOUNTER (EMERGENCY)
Facility: CLINIC | Age: 26
Discharge: HOME | End: 2021-10-13
Attending: EMERGENCY MEDICINE | Admitting: EMERGENCY MEDICINE
Payer: COMMERCIAL

## 2021-10-13 VITALS
TEMPERATURE: 97.9 F | HEIGHT: 66 IN | OXYGEN SATURATION: 100 % | BODY MASS INDEX: 25.55 KG/M2 | DIASTOLIC BLOOD PRESSURE: 84 MMHG | WEIGHT: 159 LBS | RESPIRATION RATE: 16 BRPM | SYSTOLIC BLOOD PRESSURE: 114 MMHG | HEART RATE: 96 BPM

## 2021-10-13 DIAGNOSIS — E87.6 HYPOKALEMIA: ICD-10-CM

## 2021-10-13 DIAGNOSIS — Z33.1 PREGNANT STATE, INCIDENTAL: ICD-10-CM

## 2021-10-13 DIAGNOSIS — O21.0 HYPEREMESIS GRAVIDARUM: ICD-10-CM

## 2021-10-13 LAB
ANION GAP SERPL CALCULATED.3IONS-SCNC: 10 MMOL/L (ref 3–14)
BASOPHILS # BLD AUTO: 0.1 10E3/UL (ref 0–0.2)
BASOPHILS NFR BLD AUTO: 1 %
BUN SERPL-MCNC: 14 MG/DL (ref 7–30)
CALCIUM SERPL-MCNC: 9.1 MG/DL (ref 8.5–10.1)
CHLORIDE BLD-SCNC: 101 MMOL/L (ref 94–109)
CO2 SERPL-SCNC: 25 MMOL/L (ref 20–32)
CREAT SERPL-MCNC: 0.62 MG/DL (ref 0.52–1.04)
EOSINOPHIL # BLD AUTO: 0 10E3/UL (ref 0–0.7)
EOSINOPHIL NFR BLD AUTO: 0 %
ERYTHROCYTE [DISTWIDTH] IN BLOOD BY AUTOMATED COUNT: 13.7 % (ref 10–15)
GFR SERPL CREATININE-BSD FRML MDRD: >90 ML/MIN/1.73M2
GLUCOSE BLD-MCNC: 84 MG/DL (ref 70–99)
HCT VFR BLD AUTO: 44.5 % (ref 35–47)
HGB BLD-MCNC: 14.9 G/DL (ref 11.7–15.7)
HOLD SPECIMEN: NORMAL
IMM GRANULOCYTES # BLD: 0 10E3/UL
IMM GRANULOCYTES NFR BLD: 0 %
LYMPHOCYTES # BLD AUTO: 1.9 10E3/UL (ref 0.8–5.3)
LYMPHOCYTES NFR BLD AUTO: 18 %
MCH RBC QN AUTO: 25.9 PG (ref 26.5–33)
MCHC RBC AUTO-ENTMCNC: 33.5 G/DL (ref 31.5–36.5)
MCV RBC AUTO: 77 FL (ref 78–100)
MONOCYTES # BLD AUTO: 1.1 10E3/UL (ref 0–1.3)
MONOCYTES NFR BLD AUTO: 10 %
NEUTROPHILS # BLD AUTO: 7.2 10E3/UL (ref 1.6–8.3)
NEUTROPHILS NFR BLD AUTO: 71 %
NRBC # BLD AUTO: 0 10E3/UL
NRBC BLD AUTO-RTO: 0 /100
PLATELET # BLD AUTO: 410 10E3/UL (ref 150–450)
POTASSIUM BLD-SCNC: 3.1 MMOL/L (ref 3.4–5.3)
RBC # BLD AUTO: 5.76 10E6/UL (ref 3.8–5.2)
SODIUM SERPL-SCNC: 136 MMOL/L (ref 133–144)
TROPONIN I SERPL-MCNC: <0.015 UG/L (ref 0–0.04)
WBC # BLD AUTO: 10.4 10E3/UL (ref 4–11)

## 2021-10-13 PROCEDURE — 96374 THER/PROPH/DIAG INJ IV PUSH: CPT

## 2021-10-13 PROCEDURE — 258N000003 HC RX IP 258 OP 636: Performed by: EMERGENCY MEDICINE

## 2021-10-13 PROCEDURE — 93005 ELECTROCARDIOGRAM TRACING: CPT

## 2021-10-13 PROCEDURE — 85025 COMPLETE CBC W/AUTO DIFF WBC: CPT | Performed by: EMERGENCY MEDICINE

## 2021-10-13 PROCEDURE — 36415 COLL VENOUS BLD VENIPUNCTURE: CPT | Performed by: EMERGENCY MEDICINE

## 2021-10-13 PROCEDURE — 250N000013 HC RX MED GY IP 250 OP 250 PS 637: Performed by: EMERGENCY MEDICINE

## 2021-10-13 PROCEDURE — 99284 EMERGENCY DEPT VISIT MOD MDM: CPT | Mod: 25

## 2021-10-13 PROCEDURE — 250N000009 HC RX 250: Performed by: EMERGENCY MEDICINE

## 2021-10-13 PROCEDURE — 250N000011 HC RX IP 250 OP 636: Performed by: EMERGENCY MEDICINE

## 2021-10-13 PROCEDURE — 84484 ASSAY OF TROPONIN QUANT: CPT | Performed by: EMERGENCY MEDICINE

## 2021-10-13 PROCEDURE — 96361 HYDRATE IV INFUSION ADD-ON: CPT

## 2021-10-13 PROCEDURE — 80048 BASIC METABOLIC PNL TOTAL CA: CPT | Performed by: EMERGENCY MEDICINE

## 2021-10-13 RX ORDER — METOCLOPRAMIDE HYDROCHLORIDE 5 MG/ML
10 INJECTION INTRAMUSCULAR; INTRAVENOUS ONCE
Status: COMPLETED | OUTPATIENT
Start: 2021-10-13 | End: 2021-10-13

## 2021-10-13 RX ORDER — POTASSIUM CHLORIDE 1500 MG/1
40 TABLET, EXTENDED RELEASE ORAL ONCE
Status: COMPLETED | OUTPATIENT
Start: 2021-10-13 | End: 2021-10-13

## 2021-10-13 RX ORDER — METOCLOPRAMIDE 5 MG/1
5 TABLET ORAL 3 TIMES DAILY PRN
Qty: 20 TABLET | Refills: 0 | Status: SHIPPED | OUTPATIENT
Start: 2021-10-13 | End: 2022-05-26

## 2021-10-13 RX ADMIN — LIDOCAINE HYDROCHLORIDE 30 ML: 20 SOLUTION ORAL; TOPICAL at 14:42

## 2021-10-13 RX ADMIN — SODIUM CHLORIDE 1000 ML: 9 INJECTION, SOLUTION INTRAVENOUS at 14:42

## 2021-10-13 RX ADMIN — METOCLOPRAMIDE HYDROCHLORIDE 10 MG: 5 INJECTION INTRAMUSCULAR; INTRAVENOUS at 14:42

## 2021-10-13 RX ADMIN — POTASSIUM CHLORIDE 40 MEQ: 1500 TABLET, EXTENDED RELEASE ORAL at 15:35

## 2021-10-13 ASSESSMENT — ENCOUNTER SYMPTOMS
VOMITING: 1
DIARRHEA: 0
NAUSEA: 1
FEVER: 0
SORE THROAT: 1
ABDOMINAL PAIN: 0
SHORTNESS OF BREATH: 1
APPETITE CHANGE: 1

## 2021-10-13 ASSESSMENT — MIFFLIN-ST. JEOR: SCORE: 1487.97

## 2021-10-13 NOTE — ED NOTES
Report received. Patient visualized. Vitals are stable. Patient given warm blanket per request. States she is feeling improved since arrival. Will continue to monitor

## 2021-10-13 NOTE — ED TRIAGE NOTES
7 wks pregnant, nausea vomiting for 3 weeks, having sore throat and shortness of breath for 2 weeks. 4 mg Zofran Odt given helped.

## 2021-10-13 NOTE — DISCHARGE INSTRUCTIONS
Please follow up with your ob/gyn provider.  Return to the emergency department with any new or worrisome symptoms.

## 2021-10-13 NOTE — ED NOTES
Patient is requiring frequent reminders to keep her arm straight so IV fluids will infuse. First liter of fluids has not yet been completed due to patient bending her arm.

## 2021-10-13 NOTE — ED PROVIDER NOTES
History     Chief Complaint:  Nausea & Vomiting, Pharyngitis, and Shortness of Breath      The history is provided by the patient.      Marielena Rodriguez is a 26 year old female who is 7 weeks pregnant and presents with Nausea & Vomiting, Pharyngitis, and Shortness of Breath. Symptoms started a 2 weeks ago with nausea. She describes the pain in her throat as a burning sensation. Additionally, her lungs feel like they are stuck together and it is making her short of breath. She has not taken any medications for pain management because she has not eaten or drank much recently. The patient has a history of the symptoms during her previous pregnancy and was hospitalized. She states that symptoms were resolved after receiving fluids and antinausea medications. She denies diarrhea, fever, chills, abdominal pain, vaginal bleeding, or leg swelling. Her OBGYN is at the [facility].     Review of Systems   Constitutional: Positive for appetite change. Negative for fever.   HENT: Positive for sore throat.    Respiratory: Positive for shortness of breath.    Cardiovascular: Negative for leg swelling.   Gastrointestinal: Positive for nausea and vomiting. Negative for abdominal pain and diarrhea.   Genitourinary: Negative for vaginal bleeding.   All other systems reviewed and are negative.       Allergies:  No Known Allergies    Medications:    Patient denies current use of medications.     Past Medical History:    Carrier of group B Streptococcus   UTI     Past Surgical History:    Patient denies pertinent past surgical history.     Family History:    Patient denies pertinent family history.      Social History:  Patient was unaccompanied to the ED.    Physical Exam     Patient Vitals for the past 24 hrs:   BP Temp Temp src Pulse Resp SpO2 Height Weight   10/13/21 1529 -- -- -- -- -- 100 % -- --   10/13/21 1528 -- -- -- -- -- 100 % -- --   10/13/21 1527 -- -- -- -- -- 99 % -- --   10/13/21 1526 -- -- -- -- -- 100 %  "-- --   10/13/21 1525 -- -- -- -- -- 100 % -- --   10/13/21 1524 114/84 -- -- 96 -- -- -- --   10/13/21 1430 -- -- -- 88 -- 100 % -- --   10/13/21 1228 114/77 97.9  F (36.6  C) Temporal 103 16 97 % 1.676 m (5' 6\") 72.1 kg (159 lb)       Physical Exam  Eyes:  The pupils are equal and round    Conjunctivae and sclerae are normal  ENT:    The nose is normal    Pinnae are normal    The oropharynx is normal  CV:  Regular rate and rhythm     No edema  Resp:  Lungs are clear    Non-labored    No rales    No wheezing   GI:  Abdomen is soft and non-tender, there is no rigidity    No distension    No rebound tenderness   MS:  Normal muscular tone    No asymmetric leg swelling  Skin:  No rash or acute skin lesions noted  Neuro:   Awake, alert.      Speech is normal and fluent.    Face is symmetric.     Moves all extremities    Emergency Department Course   EC  ECG taken at 1437, ECG read at 1447  Normal sinus rhythm   Bilateral enlargement   Abnormal ECG   Rate 87 bpm. MO interval 134 ms. QRS duration 72 ms. QT/QTc 366/440 ms. P-R-T axes 70 31 22.     Laboratory:  Labs Ordered and Resulted from Time of ED Arrival Up to the Time of Departure from the ED   BASIC METABOLIC PANEL - Abnormal; Notable for the following components:       Result Value    Potassium 3.1 (*)     All other components within normal limits   CBC WITH PLATELETS AND DIFFERENTIAL - Abnormal; Notable for the following components:    RBC Count 5.76 (*)     MCV 77 (*)     MCH 25.9 (*)     All other components within normal limits   TROPONIN I - Normal   EXTRA RED TOP TUBE   PERIPHERAL IV CATHETER   CBC WITH PLATELETS & DIFFERENTIAL    Narrative:     The following orders were created for panel order CBC with platelets + differential.  Procedure                               Abnormality         Status                     ---------                               -----------         ------                     CBC with platelets and d...[[id number]]  Abnormal         "    Final result                 Please view results for these tests on the individual orders.   EXTRA TUBE    Narrative:     The following orders were created for panel order [place] Draw.  Procedure                               Abnormality         Status                     ---------                               -----------         ------                     Extra Red Top Tube[[id number]]                               Final result                 Please view results for these tests on the individual orders.     Emergency Department Course:    Reviewed:  I reviewed nursing notes, vitals, past history and care everywhere    Assessments:  1423 I obtained history and examined the patient as noted above.   1527 I rechecked the patient and explained findings. She states she is feeling much better.      Interventions:  1442 NS 1 L IV   1442 Reglan 1 mg IV   1442 GI Cocktail 30 ml PO  1535 Klor-Con 40 mEq PO    Disposition:  The patient was discharged to home.    Impression & Plan    Medical Decision Making:  Marielena Rodriguez is a 26 year old who present to the ED with vomiting. She also had some burning sensation in her throat and shortness of breath associated with this. She is 7 weeks pregnant and had similar symptoms during her prior pregnancy including shortness of breath and burning in her throat. Here she is given Reglan and IV fluids and noted significant improvement of her symptoms. Additionally she was given a GI cocktail which resolved the burning in her chest. Additionally, she says that this resolved her shortness of breath. Oxygenation was 100%.  No abdominal tenderness and pain. No leg swelling or pain. Low suspicion for DVT/PE based on symptoms/exam/ED course. EKG showed some sinus tachycardia and troponin was negative. Potassium was low and she was able to tolerate oral potassium replacement. Discussed foods to use at home to help with potassium replacement such as potatoes or orange juice. She was given  a prescription for Reglan to use at home as well as Unisom. She was discharged and encouraged to return with any new or worsening symptoms.     Critical Care time:  none    Covid-19  Marielena Rodriguez was evaluated during a global COVID-19 pandemic, which necessitated consideration that the patient might be at risk for infection with the SARS-CoV-2 virus that causes COVID-19.   Applicable protocols for evaluation were followed during the patient's care.       Diagnosis:    ICD-10-CM    1. Hyperemesis gravidarum  O21.0    2. Pregnant state, incidental  Z33.1    3. Hypokalemia  E87.6        Discharge Medications:  New Prescriptions    DOXYLAMINE (UNISOM) 25 MG TABS TABLET    Take 1 tablet (25 mg) by mouth At Bedtime for 14 days    METOCLOPRAMIDE (REGLAN) 5 MG TABLET    Take 1 tablet (5 mg) by mouth 3 times daily as needed (Nausea or Vomiting)         Scribe Disclosure:  I, Yusuf Gregory, am serving as a scribe at 2:13 PM on 10/13/2021 to document services personally performed by Jimmie Walton MD based on my observations and the provider's statements to me.      Jimmie Walton MD  10/13/21 [id number]

## 2021-10-14 LAB
ATRIAL RATE - MUSE: 87 BPM
DIASTOLIC BLOOD PRESSURE - MUSE: NORMAL MMHG
INTERPRETATION ECG - MUSE: NORMAL
P AXIS - MUSE: 70 DEGREES
PR INTERVAL - MUSE: 134 MS
QRS DURATION - MUSE: 72 MS
QT - MUSE: 366 MS
QTC - MUSE: 440 MS
R AXIS - MUSE: 31 DEGREES
SYSTOLIC BLOOD PRESSURE - MUSE: NORMAL MMHG
T AXIS - MUSE: 22 DEGREES
VENTRICULAR RATE- MUSE: 87 BPM

## 2021-10-25 ENCOUNTER — TELEPHONE (OUTPATIENT)
Dept: MIDWIFE SERVICES | Facility: CLINIC | Age: 26
End: 2021-10-25

## 2021-10-25 NOTE — TELEPHONE ENCOUNTER
Forms filled out and placed in JR's basket for signature. Patient will  at next PNV.       [facility]  Surgery Scheduler

## 2021-10-28 ENCOUNTER — TELEPHONE (OUTPATIENT)
Dept: MIDWIFE SERVICES | Facility: CLINIC | Age: 26
End: 2021-10-28

## 2021-10-28 NOTE — TELEPHONE ENCOUNTER
Forms received via mail, filled out and placed in 's basket for signature.     [facility]  Surgery Scheduler

## 2021-10-28 NOTE — TELEPHONE ENCOUNTER
Forms signed by provider, placed at RD FD for patient to  at next PNV.     Alyssa  Surgery Scheduler      tea

## 2021-11-05 ENCOUNTER — TELEPHONE (OUTPATIENT)
Dept: MIDWIFE SERVICES | Facility: CLINIC | Age: 26
End: 2021-11-05
Payer: COMMERCIAL

## 2021-11-05 NOTE — TELEPHONE ENCOUNTER
Forms signed by provider, faxed back to [facility].     [facility]  Surgery Scheduler

## 2021-11-05 NOTE — TELEPHONE ENCOUNTER
Pt called to talk to Russell Otero regarding paperwork for a group they had talked about before. A message was sent to Russell. 11/5 SH

## 2021-11-09 ENCOUNTER — MYC MEDICAL ADVICE (OUTPATIENT)
Dept: OBGYN | Facility: CLINIC | Age: 26
End: 2021-11-09
Payer: COMMERCIAL

## 2021-11-10 ENCOUNTER — TELEPHONE (OUTPATIENT)
Dept: MIDWIFE SERVICES | Facility: CLINIC | Age: 26
End: 2021-11-10
Payer: COMMERCIAL

## 2021-11-10 NOTE — TELEPHONE ENCOUNTER
Mayte called with patient on the line to state that forms send to us were not for parental leave after delivery but were in regards to extreme nausea and vomiting in early pregnancy and patient's request for SWATHI from 10/05/21 to 12/13/21.     Patient seeing PAIGE for new prenatal on Friday 11/12/21 at 3:30PM, and paperwork is also due by Friday.     Forms updated reflecting request for SWATHI vs parental leave, placed in 's basket for approval and signature.       Alyssa  Surgery Scheduler

## 2021-11-11 ENCOUNTER — PRE VISIT (OUTPATIENT)
Dept: MATERNAL FETAL MEDICINE | Facility: CLINIC | Age: 26
End: 2021-11-11
Payer: COMMERCIAL

## 2021-11-12 ENCOUNTER — PRENATAL OFFICE VISIT (OUTPATIENT)
Dept: MIDWIFE SERVICES | Facility: CLINIC | Age: 26
End: 2021-11-12
Attending: ADVANCED PRACTICE MIDWIFE
Payer: COMMERCIAL

## 2021-11-12 ENCOUNTER — ANCILLARY PROCEDURE (OUTPATIENT)
Dept: ULTRASOUND IMAGING | Facility: CLINIC | Age: 26
End: 2021-11-12
Attending: ADVANCED PRACTICE MIDWIFE
Payer: COMMERCIAL

## 2021-11-12 VITALS
TEMPERATURE: 98.1 F | BODY MASS INDEX: 25.34 KG/M2 | SYSTOLIC BLOOD PRESSURE: 113 MMHG | HEART RATE: 119 BPM | WEIGHT: 157 LBS | DIASTOLIC BLOOD PRESSURE: 76 MMHG

## 2021-11-12 DIAGNOSIS — Z34.81 ENCOUNTER FOR SUPERVISION OF OTHER NORMAL PREGNANCY IN FIRST TRIMESTER: Primary | ICD-10-CM

## 2021-11-12 DIAGNOSIS — Z34.80 ENCOUNTER FOR SUPERVISION OF NORMAL PREGNANCY IN MULTIGRAVIDA: ICD-10-CM

## 2021-11-12 LAB
ERYTHROCYTE [DISTWIDTH] IN BLOOD BY AUTOMATED COUNT: 15.1 % (ref 10–15)
HCT VFR BLD AUTO: 37 % (ref 35–47)
HGB BLD-MCNC: 12.8 G/DL (ref 11.7–15.7)
MCH RBC QN AUTO: 26.3 PG (ref 26.5–33)
MCHC RBC AUTO-ENTMCNC: 34.6 G/DL (ref 31.5–36.5)
MCV RBC AUTO: 76 FL (ref 78–100)
PLATELET # BLD AUTO: 353 10E3/UL (ref 150–450)
RBC # BLD AUTO: 4.86 10E6/UL (ref 3.8–5.2)
WBC # BLD AUTO: 11.9 10E3/UL (ref 4–11)

## 2021-11-12 PROCEDURE — 99207 PR FIRST OB VISIT: CPT | Performed by: ADVANCED PRACTICE MIDWIFE

## 2021-11-12 PROCEDURE — 86780 TREPONEMA PALLIDUM: CPT | Performed by: ADVANCED PRACTICE MIDWIFE

## 2021-11-12 PROCEDURE — 36415 COLL VENOUS BLD VENIPUNCTURE: CPT | Performed by: ADVANCED PRACTICE MIDWIFE

## 2021-11-12 PROCEDURE — 87389 HIV-1 AG W/HIV-1&-2 AB AG IA: CPT | Performed by: ADVANCED PRACTICE MIDWIFE

## 2021-11-12 PROCEDURE — 86762 RUBELLA ANTIBODY: CPT | Performed by: ADVANCED PRACTICE MIDWIFE

## 2021-11-12 PROCEDURE — 87086 URINE CULTURE/COLONY COUNT: CPT | Performed by: ADVANCED PRACTICE MIDWIFE

## 2021-11-12 PROCEDURE — 87340 HEPATITIS B SURFACE AG IA: CPT | Performed by: ADVANCED PRACTICE MIDWIFE

## 2021-11-12 PROCEDURE — 76801 OB US < 14 WKS SINGLE FETUS: CPT | Performed by: OBSTETRICS & GYNECOLOGY

## 2021-11-12 PROCEDURE — 85027 COMPLETE CBC AUTOMATED: CPT | Performed by: ADVANCED PRACTICE MIDWIFE

## 2021-11-12 NOTE — TELEPHONE ENCOUNTER
Forms signed by PAIGE, faxed back to [facility] and [facility], copy placed in mail to patient's home address.     [facility]  Surgery Scheduler

## 2021-11-13 LAB
BACTERIA UR CULT: NORMAL
T PALLIDUM AB SER QL: NONREACTIVE

## 2021-11-14 LAB
HBV SURFACE AG SERPL QL IA: NONREACTIVE
HIV 1+2 AB+HIV1 P24 AG SERPL QL IA: NONREACTIVE

## 2021-11-15 LAB
RUBV IGG SERPL QL IA: 10.8 INDEX
RUBV IGG SERPL QL IA: POSITIVE

## 2021-11-16 NOTE — PROGRESS NOTES
Marielena Rodriguez is here for a NOB visit.  This like her previous pregnancy is being complicated and impacted by hyperemesis.  Is using Reglan to assist with minimalizing symptoms. of She has a 8# documented wt loss and has been unable to work so is applying for Short term disability for the 1st trimester.  Dating US is S=D.   ASSESSMENT: 11 5/7   PLAN: NOB with labs today.    Marielena Rodriguez is a 26 year old  [place]  who is a previous CNM patient.  She presents for a new OB Visit.         Patient's last menstrual period was 08/23/2021..  Estimated Date of Delivery: May 30, 2022.  Estimated Date of Delivery: May 30, 2022  correspond with Patient's last menstrual period was 08/23/2021.  She has not had bleeding since her LMP.  This was a planned pregnancy.   FOB is in good health.  FOB IS actively involved in relationship with Marielena Rodriguez and this pregnancy.        Current medications are:    Current Outpatient Medications:      metoclopramide (REGLAN) 5 MG tablet, Take 1 tablet (5 mg) by mouth 3 times daily as needed (Nausea or Vomiting), Disp: 20 tablet, Rfl: 0  No current facility-administered medications for this visit.    Facility-Administered Medications Ordered in Other Visits:      lidocaine (PF) (XYLOCAINE) 1 % injection, , , PRN, Olinda Toro MD, 2 mL at 06/10/20 0434     lidocaine-EPINEPHrine 1.5 %-[id number] injection, , EPIDURAL, PRN, Olinda Toro MD, 2 mL at 06/10/20 0432     =========================================    INFECTION HISTORY  HIV: no  Hepatitis B: no  Hepatitis C: no  Tuberculosis: no  Last PPD   Herpes self: no  Herpes partner:  no  Chlamydia:  no  Gonorrhea:  no  HPV: no  BV:  no  Trichomoniasis:  no  Syphilis:  no  Chicken Pox:  yes  ============================================    PERSONAL/SOCIAL HISTORY  Lives lives with their spouse.  Exercise Habits:  none 1-2 days per week.  Employment: Full time.  Her job involves light activity with little potential for toxic  exposure.  Travel plans are none planned. Additional items: Has been given information regarding WIC  =============================================    REVIEW OF SYSTEMS  CONSTITUTIONAL:  She denies fever, chills and viral infections since her last LMP.  There is moderate fatigue.  Weigh loss has occurred, for a total of 8 pounds..  DIET: Appetite is decrease.  Eats a Regular diet.    Recommend to gain 25-35 pounds with her pregnancy.  SKIN: Denies changes and suspicious moles or lesions.  HEAD: No headache since LMP  denies dizziness and fainting.  ENT: Denies blurred vision, hearing loss, tinnitus, frequent colds, epistaxis, hoarseness and tooth pain.    ENDOCRINE: Denies heat and cold intolerance, and polydipsia.    BREASTS:  Denies tenderness since LMP.  Denies discharge and lumps.  She does not do SBE on a monthly basis.  HEART/LUNGS: Denies dyspnea, wheezing, coughing and chest pain.  HEMATOLOGIC: Denies tendency to bruise and history of blood clots.  GASTROINTESTINAL: Denies heartburn, indigestion and change of color in stools.  She has had nausea resulting in non-bilious.  Constipation has notbeen a problem since LMP.  She denies hemorrhoids.  Denies rectal bleeding.   GENITOURINARY:  Denies urgency, dysuria and hematuria.  Has frequency of urination since LMP.  She does not experience stress incontinence.  MUSCULOSKELETAL: Denies joint stiffness, pain and restricted motion.  NEUROLOGIC:  Denies seizures, weakness and fainting.  PSYCHIATRIC:  Denies mood changes  Has no previous psychiatric history or mental health treatment.  ===========================================    PHYSICAL EXAM  GENERAL:  26 year old pleasant pregnant female, alert, cooperative and well groomed.  SKIN:  Warm and dry, without lesions or tenderness.    HEAD: Symmetrical features.  EYES:  PERRLA, wears no corective lenses.  EARS: Tympanic membranes gray, translucent and intact.  NOSE: No flaring, patent  MOUTH:  Buccal mucosa pink,  moist without lesions.  Teeth in good repair.    NECK:  Thyroid without enlargement and nodules.  Lymph nodes not palpable.   LUNGS:  Clear to auscultation.  BREAST:  Symmetrical without lesions or nodes.  Nipples everted.  Areolas symmetric.  No palpable axillary nodes.  HEART:  RRR without murmur.  ABDOMEN: Soft without masses or tenderness.  No CVA tenderness.  Uterus not palpable.  No scars noted..  MUSCULOSKELETAL:  Full range of motion.  GENITALIA: Secondary female hair growth pattern is normal.  [place] and [place] glands are without tenderness or inflammation.  Perineum without lesions.    VAGINA:  Pink, normal ruga and discharge.  CERVIX:  Posterior, smooth, without discharge or CMT and parous os, firm/ closed 4 cm long.  UTERUS: Retroflexed, nontender 11 weeks in size.  ADNEXA: Unable to assess  RECTAL:  Normal appearance.  Digital exam deferred.  PELVIS:  Arch; wide . Sacrum; deep. Spines;blunt.  Side walls; straight. Type; gynecoid  Pelvis proven to 6 pounds 8 ounces.  EXTREMITIES:  2+/2+ DTR, No edema. No significant varicosities.  ===================================================    PLAN:  GC/Chlamydia screening: Declined  NOB Labs as appropriate for patient.     consult for US for AMA patients.  Pap as indicated.  Instructed on use of triage nurse line and contacting the on call CNM after hours in an emergency.      Genetic Screening testing reviewed:  Multiple Marker Screening (QUAD Test) :  which is performed between 15 and 20 wks and combines the patients age, and measurement of MSAFP, hCG, uE3 and Inhibin A from the maternal serum.  This screen detects 70% of Down Syndrome and 60% of Trisomy 18 infants with a 5% screen positive rate.   Patient will have QUAD screen drawn: No.    Genetic Ultrasound which will be performed at 18-20 wks gestation will discover aneuploid markers in 60% of fetuses with Down Syndrome with 40% appearing normal by US.    Discussed the risks, benefits, side  effects and alternative therapies for current prescribed and OTC medications.    Will return to the clinic in 6 weeks for her next routine prenatal check.  Will call to be seen sooner if problems arise.    Russell JOHNSON, ALECM

## 2021-11-17 ENCOUNTER — TELEPHONE (OUTPATIENT)
Dept: MIDWIFE SERVICES | Facility: CLINIC | Age: 26
End: 2021-11-17
Payer: COMMERCIAL

## 2021-11-17 NOTE — TELEPHONE ENCOUNTER
Forms received via fax, filled out and attached records. Placed in JE's basket for signature.     Alyssa  [facility] Surgery Scheduler

## 2021-12-10 ENCOUNTER — TELEPHONE (OUTPATIENT)
Dept: MIDWIFE SERVICES | Facility: CLINIC | Age: 26
End: 2021-12-10

## 2021-12-10 ENCOUNTER — TELEPHONE (OUTPATIENT)
Dept: MATERNAL FETAL MEDICINE | Facility: CLINIC | Age: 26
End: 2021-12-10

## 2021-12-10 NOTE — TELEPHONE ENCOUNTER
Reason for call:  Form   Our goal is to have forms completed within 72 hours, however some forms may require a visit or additional information.     Who is the form from? Patient  Where did the form come from? Patient or family brought in     What clinic location [facility] the [place] [facility]  Where was the form placed? TC Box/Folder  What number is listed as a contact on the form? [phone]    Phone call message - patient request for a letter, form or note:     Date needed: within one week  Patient will  at the clinic when completed  Has the patient signed a consent form for release of information? Not Applicable    Additional comments: n/a    Type of letter, form or note: work     Phone number to reach patient:  Home number on file [phone] (home)    Best Time:  n/a    Can we leave a detailed message on this number?  YES    Travel screening: Not Applicable

## 2021-12-10 NOTE — TELEPHONE ENCOUNTER
JOHN received referral for first trimester screen appointment and called patient to schedule. Patient no showed to appointment and is now outside gestational age for this appointment. Referring provider notified. Removing orders.    JOHN Cardenas scheduling

## 2021-12-14 NOTE — TELEPHONE ENCOUNTER
Forms filled out, placed in GN's basket for signature.     Alyssa  [facility] Surgery Scheduler

## 2021-12-16 NOTE — TELEPHONE ENCOUNTER
Forms signed by PAIGE, copy placed in accordion folder at RD FD for patient .    Routing to RD RECEPTION to call patient regarding completion.       [facility] Scheduler

## 2021-12-21 ENCOUNTER — PRENATAL OFFICE VISIT (OUTPATIENT)
Dept: MIDWIFE SERVICES | Facility: CLINIC | Age: 26
End: 2021-12-21
Payer: COMMERCIAL

## 2021-12-21 VITALS
SYSTOLIC BLOOD PRESSURE: 112 MMHG | DIASTOLIC BLOOD PRESSURE: 71 MMHG | BODY MASS INDEX: 27.76 KG/M2 | HEART RATE: 129 BPM | WEIGHT: 172 LBS

## 2021-12-21 DIAGNOSIS — Z34.81 ENCOUNTER FOR SUPERVISION OF OTHER NORMAL PREGNANCY IN FIRST TRIMESTER: Primary | ICD-10-CM

## 2021-12-21 DIAGNOSIS — R25.2 MUSCLE CRAMP: ICD-10-CM

## 2021-12-21 PROCEDURE — 99207 PR PRENATAL VISIT: CPT | Performed by: ADVANCED PRACTICE MIDWIFE

## 2021-12-21 NOTE — PROGRESS NOTES
Marielena Rodriguez is doing much better with the severe hyperemesis resolving to point she has returned to work and a rebound on her wt from loss.  She is having some leg cramping that could be both muscle fatigue due to prolonged bedrest with hyperemesis or Magnesium deficiency.  Will start supplementation.  Also to start supplementation with PNV.  ASSESSMENT: 17w1d  Hyperemesis.  PLAN: RTC in 3 wks for fetal survey.    PAIGE

## 2022-01-14 ENCOUNTER — ANCILLARY PROCEDURE (OUTPATIENT)
Dept: ULTRASOUND IMAGING | Facility: CLINIC | Age: 27
End: 2022-01-14
Attending: ADVANCED PRACTICE MIDWIFE
Payer: COMMERCIAL

## 2022-01-14 ENCOUNTER — PRENATAL OFFICE VISIT (OUTPATIENT)
Dept: MIDWIFE SERVICES | Facility: CLINIC | Age: 27
End: 2022-01-14
Attending: ADVANCED PRACTICE MIDWIFE
Payer: COMMERCIAL

## 2022-01-14 VITALS
BODY MASS INDEX: 29.28 KG/M2 | HEIGHT: 66 IN | DIASTOLIC BLOOD PRESSURE: 62 MMHG | SYSTOLIC BLOOD PRESSURE: 103 MMHG | WEIGHT: 182.2 LBS | HEART RATE: 86 BPM

## 2022-01-14 DIAGNOSIS — Z23 NEED FOR TDAP VACCINATION: Primary | ICD-10-CM

## 2022-01-14 DIAGNOSIS — Z34.81 ENCOUNTER FOR SUPERVISION OF OTHER NORMAL PREGNANCY IN FIRST TRIMESTER: ICD-10-CM

## 2022-01-14 DIAGNOSIS — O35.BXX0 ECHOGENIC FOCUS OF HEART OF FETUS AFFECTING ANTEPARTUM CARE OF MOTHER, SINGLE OR UNSPECIFIED FETUS: ICD-10-CM

## 2022-01-14 PROCEDURE — 99207 PR PRENATAL VISIT: CPT | Performed by: ADVANCED PRACTICE MIDWIFE

## 2022-01-14 PROCEDURE — 76805 OB US >/= 14 WKS SNGL FETUS: CPT | Performed by: OBSTETRICS & GYNECOLOGY

## 2022-01-14 ASSESSMENT — MIFFLIN-ST. JEOR: SCORE: 1593.2

## 2022-01-14 NOTE — PROGRESS NOTES
Rebound wt gain noted.  Discussed rice and bread intake to be less than veggies.  Fetal survey is nl except for LV EIF and RVOT and spine suboptimal views.   Follow up with MFM.   ASSESSMENT: 20w4d   PLAN: RTC in 4 wks for GCT.    PAIGE

## 2022-01-17 ENCOUNTER — PRE VISIT (OUTPATIENT)
Dept: MATERNAL FETAL MEDICINE | Facility: CLINIC | Age: 27
End: 2022-01-17
Payer: COMMERCIAL

## 2022-01-17 ENCOUNTER — TRANSCRIBE ORDERS (OUTPATIENT)
Dept: MATERNAL FETAL MEDICINE | Facility: CLINIC | Age: 27
End: 2022-01-17
Payer: COMMERCIAL

## 2022-01-17 DIAGNOSIS — O26.90 PREGNANCY RELATED CONDITION, ANTEPARTUM: Primary | ICD-10-CM

## 2022-01-19 ENCOUNTER — LAB (OUTPATIENT)
Dept: LAB | Facility: CLINIC | Age: 27
End: 2022-01-19
Attending: ADVANCED PRACTICE MIDWIFE
Payer: COMMERCIAL

## 2022-01-19 ENCOUNTER — OFFICE VISIT (OUTPATIENT)
Dept: MATERNAL FETAL MEDICINE | Facility: CLINIC | Age: 27
End: 2022-01-19
Attending: ADVANCED PRACTICE MIDWIFE
Payer: COMMERCIAL

## 2022-01-19 ENCOUNTER — OFFICE VISIT (OUTPATIENT)
Dept: MATERNAL FETAL MEDICINE | Facility: CLINIC | Age: 27
End: 2022-01-19
Attending: OBSTETRICS & GYNECOLOGY
Payer: COMMERCIAL

## 2022-01-19 ENCOUNTER — HOSPITAL ENCOUNTER (OUTPATIENT)
Dept: ULTRASOUND IMAGING | Facility: CLINIC | Age: 27
End: 2022-01-19
Attending: ADVANCED PRACTICE MIDWIFE
Payer: COMMERCIAL

## 2022-01-19 DIAGNOSIS — O26.90 PREGNANCY RELATED CONDITION, ANTEPARTUM: ICD-10-CM

## 2022-01-19 DIAGNOSIS — O28.3 ABNORMAL PRENATAL ULTRASOUND: Primary | ICD-10-CM

## 2022-01-19 DIAGNOSIS — O35.DXX0 ECHOGENIC FOCUS OF BOWEL OF FETUS AFFECTING ANTEPARTUM CARE OF MOTHER, SINGLE OR UNSPECIFIED FETUS: Primary | ICD-10-CM

## 2022-01-19 DIAGNOSIS — O28.3 ABNORMAL PRENATAL ULTRASOUND: ICD-10-CM

## 2022-01-19 PROCEDURE — 76811 OB US DETAILED SNGL FETUS: CPT

## 2022-01-19 PROCEDURE — 76811 OB US DETAILED SNGL FETUS: CPT | Mod: 26 | Performed by: OBSTETRICS & GYNECOLOGY

## 2022-01-19 PROCEDURE — 999N000069 HC STATISTIC GENETIC COUNSELING, < 16 MIN: Performed by: GENETIC COUNSELOR, MS

## 2022-01-19 PROCEDURE — 36415 COLL VENOUS BLD VENIPUNCTURE: CPT

## 2022-01-19 NOTE — PROGRESS NOTES
[facility]  Genetic Counseling Consult    Patient:  Marielena Rodriguez YOB: 1995   Date of Service:  22      Marielena Rodriguez was seen at the [facility] for comprehensive ultrasound. I met with the patient following her ultrasound at the request of Dr. Park to discuss the options for screening and testing for fetal chromosome abnormalities. The patient was accompanied by her partner and child to today's visit.        Impression/Plan:   1. Marielena had a comprehensive (level II) ultrasound today.  Please see the ultrasound report for further details.    2. Marielena has not had serum screening in the current pregnancy. Given EIF noted on today's ultrasound, Marielena has elected to pursue NIPT through [facility]. Results are expected within 7-10 days, and will be available in [place].  We will contact her to discuss the results, and a copy will be forwarded to the office of the referring OB provider. Marielena provided verbal permission for results to be left on her voicemail, aware of male fetus.     Pregnancy History:   /Parity:    Age at Delivery: 26 year old  COSME: 2022, by Last Menstrual Period  Gestational Age: 21w2d       Risk Assessment for Chromosome Conditions:   We explained that the risk for fetal chromosome abnormalities increases with maternal age. We discussed specific features of common chromosome abnormalities, including Down syndrome, trisomy 13, trisomy 18, and sex chromosome trisomies.      - At age 26 at midtrimester, the risk to have a baby with Down syndrome is 1 in 1087.    - At age 26 at midtrimester, the risk to have a baby with any chromosome abnormality is 1 in 544.       Marielena did not have maternal serum screening earlier in pregnancy.     We briefly reviewed the ultrasound finding of echogenic intracardiac focus, which refers to a small bright spot in the heart.  This finding is  generally not associated with heart defects or other heart problems.  Some studies have suggested that this ultrasound finding is thought to be seen with a higher frequency in pregnancies with a chromosome problem (particularly Down syndrome), as compared to pregnancies that do not have a chromosome problem. Because of this, it is thought that this ultrasound finding may increase the risk of a chromosome problem in a pregnancy.       Testing Options:   We discussed the following options:   Non-invasive Prenatal Testing (NIPT)    Maternal plasma cell-free DNA testing; first trimester ultrasound with nuchal translucency and nasal bone assessment is recommended, when appropriate    Screens for fetal trisomy 21, trisomy 13, trisomy 18, and sex chromosome aneuploidy    Cannot screen for open neural tube defects; maternal serum AFP after 15 weeks is recommended     Genetic Amniocentesis    Invasive procedure typically performed in the second trimester by which amniotic fluid is obtained for the purpose of chromosome analysis and/or other prenatal genetic analysis    Diagnostic results; >99% sensitivity for fetal chromosome abnormalities    AFAFP measurement tests for open neural tube defects      We reviewed the benefits and limitations of this testing.  Screening tests provide a risk assessment specific to the pregnancy for certain fetal chromosome abnormalities, but cannot definitively diagnose or exclude a fetal chromosome abnormality.  Follow-up genetic counseling and consideration of diagnostic testing is recommended with any abnormal screening result.     Diagnostic tests carry inherent risks- including risk of miscarriage- that require careful consideration.  These tests can detect fetal chromosome abnormalities with greater than 99% certainty.  Results can be compromised by maternal cell contamination or mosaicism, and are limited by the resolution of cytogenetic G-banding technology.  There is no screening nor  diagnostic test that can detect all forms of birth defects or mental disability.    It was a pleasure to be involved with Marielena s care. Face-to-face time of the meeting was <15 minutes.    Mala Washburn MS, [facility]  Licensed Genetic Counselor  [facility]  Maternal Fetal Medicine  Ph: [phone]  [email or web address]

## 2022-01-19 NOTE — PROGRESS NOTES
"Please see \"Imaging\" tab under \"Chart Review\" for details of today's US.    Ysabel Park, DO    "

## 2022-01-31 ENCOUNTER — TELEPHONE (OUTPATIENT)
Dept: MATERNAL FETAL MEDICINE | Facility: CLINIC | Age: 27
End: 2022-01-31
Payer: COMMERCIAL

## 2022-01-31 NOTE — TELEPHONE ENCOUNTER
January 31, 2022    I spoke with Marielena to let her know that NIPT is still pending and there has been a delay by the performing laboratory. I will contact her as soon as results become available.     Mala Washburn MS, [facility]  Licensed Genetic Counselor  [facility]  Maternal Fetal Medicine  Ph: [phone]  [email or web address]

## 2022-02-03 LAB — SCANNED LAB RESULT: NORMAL

## 2022-02-03 NOTE — TELEPHONE ENCOUNTER
February 3, 2022  I spoke with Marielena regarding her NIPT results.     Results indicate NO ANEUPLOIDY DETECTED for chromosomes 21, 18, 13, or sex chromosomes (XY).     This puts her current pregnancy at low risk for Down syndrome, trisomy 18, trisomy 13 and sex chromosome abnormalities. This test is reported to have the following sensitivities: Down syndrome: 99.99%, trisomy 18: 99.99%, and trisomy 13: 99.99%. Although these results are reassuring, this does not replace a standard chromosome analysis from a chorionic villus sampling or amniocentesis.     Her results are available in her Epic chart for her primary OB to review.     Mala Washburn MS, [facility]  Licensed Genetic Counselor  [facility]  Maternal Fetal Medicine  Ph: [phone]  [email or web address]

## 2022-02-10 ENCOUNTER — LAB REQUISITION (OUTPATIENT)
Dept: LAB | Facility: CLINIC | Age: 27
End: 2022-02-10

## 2022-02-10 LAB — SARS-COV-2 RNA RESP QL NAA+PROBE: NEGATIVE

## 2022-02-10 PROCEDURE — U0005 INFEC AGEN DETEC AMPLI PROBE: HCPCS | Performed by: INTERNAL MEDICINE

## 2022-02-11 ENCOUNTER — LAB (OUTPATIENT)
Dept: LAB | Facility: CLINIC | Age: 27
End: 2022-02-11
Payer: COMMERCIAL

## 2022-02-11 DIAGNOSIS — Z34.81 ENCOUNTER FOR SUPERVISION OF OTHER NORMAL PREGNANCY IN FIRST TRIMESTER: ICD-10-CM

## 2022-02-11 LAB
GLUCOSE 1H P 50 G GLC PO SERPL-MCNC: 124 MG/DL (ref 70–129)
HGB BLD-MCNC: 11.7 G/DL (ref 11.7–15.7)

## 2022-02-11 PROCEDURE — 82950 GLUCOSE TEST: CPT

## 2022-02-11 PROCEDURE — 36415 COLL VENOUS BLD VENIPUNCTURE: CPT

## 2022-02-16 ENCOUNTER — PRENATAL OFFICE VISIT (OUTPATIENT)
Dept: MIDWIFE SERVICES | Facility: CLINIC | Age: 27
End: 2022-02-16
Payer: COMMERCIAL

## 2022-02-16 VITALS
OXYGEN SATURATION: 98 % | WEIGHT: 184.4 LBS | DIASTOLIC BLOOD PRESSURE: 66 MMHG | SYSTOLIC BLOOD PRESSURE: 101 MMHG | BODY MASS INDEX: 29.76 KG/M2

## 2022-02-16 DIAGNOSIS — Z34.81 ENCOUNTER FOR SUPERVISION OF OTHER NORMAL PREGNANCY IN FIRST TRIMESTER: ICD-10-CM

## 2022-02-16 PROCEDURE — 99207 PR PRENATAL VISIT: CPT | Performed by: ADVANCED PRACTICE MIDWIFE

## 2022-02-16 NOTE — PROGRESS NOTES
25w2d   Marielena is not feeling the best today. She reports body aches and sinus headaches that began a few days ago, she notes occasional dizziness with long periods of standing. No palpitations or shortness of breath. Has taken a COVID test last week and it was negative, she plans on doing another one in the next few days. Declines a COVID test today in clinic. Denies fevers, fatigue , GI disturbances. She is vaccinated against COVID but not boosted. We discussed the importance of taking a COVID test and also the current recommendation for COVID booster vaccination for pregnant women. She will consider a booster and get back to us. Her nausea has improved. Appetite is better, she is trying to eat less rice and bread and more vegetables. Has been having a lot of symphysis pubis pain and curious about ways to help improve it. Denies vaginal bleeding, discharge that's itchy or irritating, severe headaches, leakage of fluids, blurry or double vision, RUQ pain.     GCT and Hgb completed on 2/11 and WNL.   PT referral today.  Discussed use of pregnancy support belt, hydrotherapy, Tylenol PRN for symphysis pubis pain.   TDAP at next visit.   RTC in 4 weeks.     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 I have verified the history and personally performed the physical exam and medical decision making, as documented by the student and edited by me.    Coni Henely, ELIZABETH MICHAEL   February 16, 2022

## 2022-02-17 ENCOUNTER — LAB REQUISITION (OUTPATIENT)
Dept: LAB | Facility: CLINIC | Age: 27
End: 2022-02-17

## 2022-02-17 LAB — SARS-COV-2 RNA RESP QL NAA+PROBE: NEGATIVE

## 2022-02-17 PROCEDURE — U0005 INFEC AGEN DETEC AMPLI PROBE: HCPCS | Performed by: INTERNAL MEDICINE

## 2022-03-07 ENCOUNTER — THERAPY VISIT (OUTPATIENT)
Dept: PHYSICAL THERAPY | Facility: CLINIC | Age: 27
End: 2022-03-07
Attending: ADVANCED PRACTICE MIDWIFE
Payer: COMMERCIAL

## 2022-03-07 DIAGNOSIS — N39.3 STRESS INCONTINENCE OF URINE: ICD-10-CM

## 2022-03-07 DIAGNOSIS — O99.891 PAIN IN SYMPHYSIS PUBIS DURING PREGNANCY: Primary | ICD-10-CM

## 2022-03-07 DIAGNOSIS — M79.18 PAIN IN SYMPHYSIS PUBIS DURING PREGNANCY: Primary | ICD-10-CM

## 2022-03-07 PROCEDURE — 97140 MANUAL THERAPY 1/> REGIONS: CPT | Mod: GP

## 2022-03-07 PROCEDURE — 97535 SELF CARE MNGMENT TRAINING: CPT | Mod: GP

## 2022-03-07 PROCEDURE — 97110 THERAPEUTIC EXERCISES: CPT | Mod: GP

## 2022-03-07 PROCEDURE — 97161 PT EVAL LOW COMPLEX 20 MIN: CPT | Mod: GP

## 2022-03-07 NOTE — PROGRESS NOTES
"Physical Therapy Initial Evaluation  Subjective:  The history is provided by the patient. No  was used.   Therapist Generated HPI Evaluation  Problem details: Patient is about 27 wk pregnant and she reports pain in L inguinal crease, into L adductors, and vaginal canal. The pain has begun since her current pregnancy. She did not have any pain during her first pregnancy and had an uncomplicated vaginal delivery. She notes that getting out of bed is especially painful and she can have difficulty walking. She notes pain with rolling over in bed. She was given a belt at her midwife appointment, has not tried it yet because it is uncomfortable at work but she is willing to try it at home.   She also notes she is having SRIDHAR, frequency, and nocturia. She thinks these are pregnancy related. She has fallen twice in the past month, feeling like her legs gave out and she landed on her back without injury or complication. She notes she can get dizzy and have headaches, but don't think these contributed to her fall.   Exercise: walking a lot at work but not currently doing anything else.   Goals: \"To be able to manage my pain or at least for it to go away\"    Urination:  Do you leak on the way to the bathroom or with a strong urge to void? n   Do you leak with cough,sneeze, jumping, running? y  Any other activities that cause leaking?  n  Do you have triggers that make you feel you can't wait to go to the bathroom?  What are they? Pregnancy bladder.  Type of pad and number used per day? none  When you leak what is the amount? sm  How long can you delay the need to urinate? I cannot delay.   Frequency of daytime urination:1-2x/hr  Frequency of nighttime urination:3  Can you stop the flow of urine when on the toilet? n  Is the volume of urine passed usually:  (8sec rule= 250ml with average bladder storing 400-600ml) avg  Do you strain to pass urine? n  Do you have a slow or hesitant urinary stream? y  Do you " have difficulty initiating the urine stream? n  Is urination painful? n  How many bladder infections have you had in last 12 months?0  Fluid intake(one glass is 8oz or one cup) 4-5 bottles water/day, 1caffinated glasses/day, 0 alcohol glasses/day.    Bowel habits:  Frequency of bowel movements? 1 times a day  Consistancy of stool?  Soft formed  Do you ignore the urge to defecate? n  Do you strain to pass stool? n    Pelvic Pain:  Do you have any pelvic pain? Y, started 3 mo ago  Are you sexually active? y  Is initial penetration during intercourse painful? n  Is deeper penetration painful? n  Do you use lubricant?  n  Have you ever been worried for your physical safety? n  Have you practiced the PF(kegel) exercises for 4 or more weeks?n  Marinoff Scale:Level 1  (Level 3: Abstinence from intercourse because of severe pain. Level 2: Painful intercourse which limites frequency of activity. Level 1: Painful intercourse not severe enough to prevent activity.)  .                                    Patient Health History  Marielena Rodriguez being seen for Physical therapy for the pain in my pelvic area.     Date of Onset: 3 mo ago.   Problem occurred: Maybe d/t pregnancy   Pain is reported as 9/10 on pain scale.  General health as reported by patient is good.  Pertinent medical history includes: concussions/dizziness and currently pregnant.                Current occupation is Lab unit coordinator.   Primary job tasks include:  Prolonged standing and prolonged sitting.                                    Objective:  System         Lumbar/SI Evaluation                      SI joint/Sacrum:    Palpation of pubic symphysis painful, negative palpation of ASIS and inguinal crease bilaterally. No pelvic asymmetry palpation. Positive ASLR on L. Pain reduce with manual ilium compression and TA activation. Resisted hip adduction painful on L side                                  Pelvic Dysfunction Evaluation:                  External  Assessment:  External assessment pelvic: Absent knack with cough. Initial glute and adductor substitutions, better isolation of PFM with cues and anal wink facilitation.   Skin Condition:  Normal          Muscle Contraction/Perineal Mobility:  Slight lift, no urogential triangle descent and substitution    Additional History:  Delivery History:  Vaginal delivery  Number of Pregnancies: 2  Number of Live Births: 1            Hip Evaluation    Hip Strength:    Flexion:   Left: 3+/5   Pain:  Right: 3+/5   Pain:                      Abduction:  Left: 3+/5     Pain:Right: 3+/5    Pain:  Adduction:  Left: 4/5   +++   Pain:Right: 4/5   -  Pain:    External Rotation:  Left: 4/5   Pain:  Right: 4/5   Pain:    Knee Extension:  Left: 4/5   Pain:Right: 4/5    Pain:          Hip Palpation:    Left hip tenderness present at:   Pubis  Left hip tenderness not present at:  PSIS; ASIS or Iliac Crest    Right hip tenderness not present at:  PSIS; ASIS; Iliac Crest or Pubis             General     ROS    Assessment/Plan:    Patient is a 26 year old female with pelvic complaints.    Patient has the following significant findings with corresponding treatment plan.                Diagnosis 1:  Pubic symphysis pain during pregnancy  Pain -  manual therapy, splint/taping/bracing/orthotics, self management and education  Decreased strength - therapeutic exercise and therapeutic activities  Impaired muscle performance - biofeedback and neuro re-education  Decreased function - therapeutic activities and home program  Diagnosis 2:  Stress Urinary Incontinence   -please see above    Therapy Evaluation Codes:   1) History comprised of:   Personal factors that impact the plan of care:      None.    Comorbidity factors that impact the plan of care are:      Current pregnancy and Dizziness.     Medications impacting care: None.  2) Examination of Body Systems comprised of:   Body structures and functions that impact the plan of care:      Hip,  Lumbar spine, Pelvis, Sacral illiac joint and Thoracic Spine.   Activity limitations that impact the plan of care are:      Lifting, Sports, Standing, Walking, Sleeping and Stress incontinence.  3) Clinical presentation characteristics are:   Evolving/Changing.  4) Decision-Making    Low complexity using standardized patient assessment instrument and/or measureable assessment of functional outcome.  Cumulative Therapy Evaluation is: Low complexity.    Previous and current functional limitations:  (See Goal Flow Sheet for this information)    Short term and Long term goals: (See Goal Flow Sheet for this information)     Communication ability:  Patient appears to be able to clearly communicate and understand verbal and written communication and follow directions correctly.  Treatment Explanation - The following has been discussed with the patient:   RX ordered/plan of care  Anticipated outcomes  Possible risks and side effects  This patient would benefit from PT intervention to resume normal activities.   Rehab potential is good.    Frequency:  1 X week, once daily  Duration:  for 12 weeks  Discharge Plan:  Achieve all LTG.  Independent in home treatment program.  Reach maximal therapeutic benefit.    Please refer to the daily flowsheet for treatment today, total treatment time and time spent performing 1:1 timed codes.     Delores Al, PT, DPT

## 2022-03-07 NOTE — PROGRESS NOTES
[facility]    OUTPATIENT Physical Therapy ORTHOPEDIC EVALUATION  PLAN OF TREATMENT FOR OUTPATIENT REHABILITATION  (COMPLETE FOR INITIAL CLAIMS ONLY)  Patient's Last Name, First Name, M.I.  YOB: 1995  Marielena Rodriguez    Provider s Name:  [facility]   Medical Record No.  [id number]   Start of Care Date:  03/07/22   Onset Date:   02/16/22 (Order date)   Type:     _X__PT   ___OT Medical Diagnosis:    Encounter Diagnoses   Name Primary?     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      Pain in symphysis pubis during pregnancy Yes     Stress incontinence of urine         Treatment Diagnosis:  Pubic symphysis pain during pregnancy        Goals:     03/07/22 0700   Urinary Leakage   Previous Functional Level No problems   Current Functional Level Leakage with cough or sneeze   LTG Target Performance Decrease leakage with   Performance Level cough/sneeze by 50%   Rationale continence throughout the day   Due Date 05/30/22   Pelvic Pain   Previous Functional Level No restrictions   Current Functional Level Level of pain experienced with  (transfers)   Performance Level 9/10   STG Target Performance Decrease pain with   Performance Level rolling in bed to 6/10   Rationale for pain free ADL's   Due Date 04/04/22   LTG Target Performance Decrease pain with   Performance Level functional movements such as transfers to 4/10   Rationale for pain free ADL's   Due Date 05/30/22       Therapy Frequency:  1x/wk  Predicted Duration of Therapy Intervention:  12 wk    HATTIE MARTINEZ, PT                 I CERTIFY THE NEED FOR THESE SERVICES FURNISHED UNDER        THIS PLAN OF TREATMENT AND WHILE UNDER MY CARE     (Physician co-signature of this document indicates review and certification of the therapy plan).                       Certification Date From:  03/07/22   Certification Date  To:  05/30/22    Referring Provider:  Coni Henley    Initial Assessment        See Epic Evaluation SOC Date: 03/07/22

## 2022-03-17 ENCOUNTER — PRENATAL OFFICE VISIT (OUTPATIENT)
Dept: MIDWIFE SERVICES | Facility: CLINIC | Age: 27
End: 2022-03-17
Payer: COMMERCIAL

## 2022-03-17 VITALS
DIASTOLIC BLOOD PRESSURE: 62 MMHG | HEART RATE: 83 BPM | OXYGEN SATURATION: 98 % | BODY MASS INDEX: 29.41 KG/M2 | WEIGHT: 182.2 LBS | SYSTOLIC BLOOD PRESSURE: 99 MMHG

## 2022-03-17 DIAGNOSIS — Z34.83 ENCOUNTER FOR SUPERVISION OF OTHER NORMAL PREGNANCY IN THIRD TRIMESTER: Primary | ICD-10-CM

## 2022-03-17 PROCEDURE — 99207 PR PRENATAL VISIT: CPT | Performed by: ADVANCED PRACTICE MIDWIFE

## 2022-03-17 NOTE — PROGRESS NOTES
29w3d   Pt feeling well, no c/o.  Asking about baby size.  Her last baby was 6 lbs and does not want to have a 9 lb baby.  Discussed we will monitor baby size as she progresses through 3rd trimester.  Currently measuring WNL, but slightly under her wks gestation.  Discussed TDAP, pt states she will consider, given info to read about it, please ask again next visit.  Has decided to wait on covid booster for now.  RTC 3 wks ELIZABETH GilliamM

## 2022-04-04 ENCOUNTER — THERAPY VISIT (OUTPATIENT)
Dept: PHYSICAL THERAPY | Facility: CLINIC | Age: 27
End: 2022-04-04
Payer: COMMERCIAL

## 2022-04-04 DIAGNOSIS — O99.891 PAIN IN SYMPHYSIS PUBIS DURING PREGNANCY: Primary | ICD-10-CM

## 2022-04-04 DIAGNOSIS — M79.18 PAIN IN SYMPHYSIS PUBIS DURING PREGNANCY: Primary | ICD-10-CM

## 2022-04-04 PROCEDURE — 97530 THERAPEUTIC ACTIVITIES: CPT | Mod: GP

## 2022-04-04 PROCEDURE — 97110 THERAPEUTIC EXERCISES: CPT | Mod: GP

## 2022-04-04 PROCEDURE — 97140 MANUAL THERAPY 1/> REGIONS: CPT | Mod: GP

## 2022-04-08 ENCOUNTER — PRENATAL OFFICE VISIT (OUTPATIENT)
Dept: MIDWIFE SERVICES | Facility: CLINIC | Age: 27
End: 2022-04-08
Payer: COMMERCIAL

## 2022-04-08 VITALS
DIASTOLIC BLOOD PRESSURE: 56 MMHG | BODY MASS INDEX: 29.78 KG/M2 | HEART RATE: 79 BPM | SYSTOLIC BLOOD PRESSURE: 104 MMHG | HEIGHT: 66 IN | WEIGHT: 185.3 LBS

## 2022-04-08 DIAGNOSIS — Z34.83 ENCOUNTER FOR SUPERVISION OF OTHER NORMAL PREGNANCY IN THIRD TRIMESTER: Primary | ICD-10-CM

## 2022-04-08 PROCEDURE — 99207 PR PRENATAL VISIT: CPT | Performed by: ADVANCED PRACTICE MIDWIFE

## 2022-04-08 NOTE — PROGRESS NOTES
32w4d   Here with Augusta and daughter, Feeling well with exception of continued symphysis pubis pain.  Pt has been to PT, has support belt and exercises, discussed physiology and comfort measures.  Declines TDAP.  RTC 2 wks ELIZABETH GilliamM

## 2022-04-20 ENCOUNTER — PRENATAL OFFICE VISIT (OUTPATIENT)
Dept: MIDWIFE SERVICES | Facility: CLINIC | Age: 27
End: 2022-04-20
Payer: COMMERCIAL

## 2022-04-20 VITALS
BODY MASS INDEX: 30.23 KG/M2 | HEART RATE: 69 BPM | WEIGHT: 187.3 LBS | DIASTOLIC BLOOD PRESSURE: 64 MMHG | SYSTOLIC BLOOD PRESSURE: 97 MMHG

## 2022-04-20 DIAGNOSIS — Z34.83 ENCOUNTER FOR SUPERVISION OF OTHER NORMAL PREGNANCY IN THIRD TRIMESTER: Primary | ICD-10-CM

## 2022-04-20 PROCEDURE — 99207 PR PRENATAL VISIT: CPT | Performed by: ADVANCED PRACTICE MIDWIFE

## 2022-04-21 NOTE — PROGRESS NOTES
Here with Guera and daughter Seda.  Is very uncomfortable with the pelvic pressure and pain down her legs that if feels numb at times.  Recommended Chiropractic, massage, maternity belt, pillows between legs, Tylenol PM, blanket roll.  Stated PT did not help much.  ASSESSMENT: 34w2d   PLAN: RTC in 2 wks for PNC, GBS and Hgb.   PAIGE

## 2022-04-25 ENCOUNTER — HOSPITAL ENCOUNTER (OUTPATIENT)
Facility: CLINIC | Age: 27
Discharge: HOME | End: 2022-04-25
Attending: OBSTETRICS & GYNECOLOGY | Admitting: NURSE PRACTITIONER
Payer: COMMERCIAL

## 2022-04-25 ENCOUNTER — HOSPITAL ENCOUNTER (OUTPATIENT)
Facility: CLINIC | Age: 27
End: 2022-04-25
Admitting: NURSE PRACTITIONER
Payer: COMMERCIAL

## 2022-04-25 VITALS
RESPIRATION RATE: 16 BRPM | WEIGHT: 185 LBS | HEART RATE: 70 BPM | SYSTOLIC BLOOD PRESSURE: 110 MMHG | BODY MASS INDEX: 30.82 KG/M2 | TEMPERATURE: 97.8 F | DIASTOLIC BLOOD PRESSURE: 56 MMHG | HEIGHT: 65 IN

## 2022-04-25 DIAGNOSIS — O23.43 URINARY TRACT INFECTION IN MOTHER DURING THIRD TRIMESTER OF PREGNANCY: ICD-10-CM

## 2022-04-25 DIAGNOSIS — O26.893 DYSURIA DURING PREGNANCY IN THIRD TRIMESTER: Primary | ICD-10-CM

## 2022-04-25 DIAGNOSIS — R30.0 DYSURIA DURING PREGNANCY IN THIRD TRIMESTER: Primary | ICD-10-CM

## 2022-04-25 PROBLEM — Z36.89 ENCOUNTER FOR TRIAGE IN PREGNANT PATIENT: Status: ACTIVE | Noted: 2022-04-25

## 2022-04-25 LAB
ALBUMIN UR-MCNC: 20 MG/DL
APPEARANCE UR: ABNORMAL
BILIRUB UR QL STRIP: NEGATIVE
COLOR UR AUTO: ABNORMAL
GLUCOSE UR STRIP-MCNC: 300 MG/DL
HGB UR QL STRIP: NEGATIVE
KETONES UR STRIP-MCNC: NEGATIVE MG/DL
LEUKOCYTE ESTERASE UR QL STRIP: ABNORMAL
MUCOUS THREADS #/AREA URNS LPF: PRESENT /LPF
NITRATE UR QL: NEGATIVE
PH UR STRIP: 5.5 [PH] (ref 5–7)
RBC URINE: 7 /HPF
SP GR UR STRIP: 1.02 (ref 1–1.03)
SQUAMOUS EPITHELIAL: 13 /HPF
UROBILINOGEN UR STRIP-MCNC: NORMAL MG/DL
WBC URINE: 108 /HPF

## 2022-04-25 PROCEDURE — 81001 URINALYSIS AUTO W/SCOPE: CPT | Performed by: OBSTETRICS & GYNECOLOGY

## 2022-04-25 PROCEDURE — G0463 HOSPITAL OUTPT CLINIC VISIT: HCPCS | Mod: 25

## 2022-04-25 PROCEDURE — 999N000105 HC STATISTIC NO DOCUMENTATION TO SUPPORT CHARGE

## 2022-04-25 PROCEDURE — 59025 FETAL NON-STRESS TEST: CPT

## 2022-04-25 PROCEDURE — 87086 URINE CULTURE/COLONY COUNT: CPT | Performed by: OBSTETRICS & GYNECOLOGY

## 2022-04-25 RX ORDER — LIDOCAINE 40 MG/G
CREAM TOPICAL
Status: DISCONTINUED | OUTPATIENT
Start: 2022-04-25 | End: 2022-04-25 | Stop reason: HOSPADM

## 2022-04-25 RX ORDER — NITROFURANTOIN 25; 75 MG/1; MG/1
100 CAPSULE ORAL 2 TIMES DAILY
Qty: 10 CAPSULE | Refills: 0 | Status: SHIPPED | OUTPATIENT
Start: 2022-04-25 | End: 2022-04-30

## 2022-04-25 NOTE — DISCHARGE INSTRUCTIONS
Discharge Instruction for Undelivered Patients      You were seen for: left lower abdominal pain  We Consulted: Priyanka Perez  You had (Test or Medicine):NST UA wet prep    Diet: regular     Activity:as tolerated    Call your provider if you notice:  Swelling in your face or increased swelling in your hands or legs.  Headaches that are not relieved by Tylenol (acetaminophen).  Changes in your vision (blurring: seeing spots or stars.)  Nausea (sick to your stomach) and vomiting (throwing up).   Weight gain of 5 pounds or more per week.  Heartburn that doesn't go away.  Signs of bladder infection: pain when you urinate (use the toilet), need to go more often and more urgently.  The bag of sainz (rupture of membranes) breaks, or you notice leaking in your underwear.  Bright red blood in your underwear.  Abdominal (lower belly) or stomach pain.  For first baby: Contractions (tightening) less than 5 minutes apart for one hour or more.  Second (plus) baby: Contractions (tightening) less than 10 minutes apart and getting stronger.  *If less than 34 weeks: Contractions (tightening) more than 6 times in one hour.  Increase or change in vaginal discharge (note the color and amount)  Other: take Macrobid as ordered.     Follow-up: May 5th 2022

## 2022-04-25 NOTE — PLAN OF CARE
Wet prep specimen obtained and sent to lab. Discharge orders reviewed with pt all questions answered.

## 2022-04-25 NOTE — PLAN OF CARE
JOSSUE Perez called with UA results. Orders received to collect a wet prep and may discharge to home. JOSSUE Perez will call RX for Macrobid to [facility] pharmacy on [address] in [place] as requested by pt.

## 2022-04-27 LAB — BACTERIA UR CULT: NORMAL

## 2022-05-05 ENCOUNTER — PRENATAL OFFICE VISIT (OUTPATIENT)
Dept: MIDWIFE SERVICES | Facility: CLINIC | Age: 27
End: 2022-05-05
Payer: COMMERCIAL

## 2022-05-05 VITALS
SYSTOLIC BLOOD PRESSURE: 102 MMHG | BODY MASS INDEX: 30.37 KG/M2 | HEART RATE: 75 BPM | HEIGHT: 66 IN | OXYGEN SATURATION: 99 % | WEIGHT: 189 LBS | DIASTOLIC BLOOD PRESSURE: 67 MMHG

## 2022-05-05 DIAGNOSIS — Z34.83 ENCOUNTER FOR SUPERVISION OF OTHER NORMAL PREGNANCY IN THIRD TRIMESTER: Primary | ICD-10-CM

## 2022-05-05 LAB
HGB BLD-MCNC: 11.1 G/DL (ref 11.7–15.7)
HOLD SPECIMEN: NORMAL

## 2022-05-05 PROCEDURE — 36415 COLL VENOUS BLD VENIPUNCTURE: CPT | Performed by: ADVANCED PRACTICE MIDWIFE

## 2022-05-05 PROCEDURE — 99207 PR PRENATAL VISIT: CPT | Performed by: ADVANCED PRACTICE MIDWIFE

## 2022-05-05 RX ORDER — PRENATAL VIT/IRON FUM/FOLIC AC 27MG-0.8MG
1 TABLET ORAL DAILY
COMMUNITY
End: 2024-03-01

## 2022-05-07 NOTE — PROGRESS NOTES
Marielena and her  and daugher here for PNC.  Some lower uterine cramping.  Is GBS positive so no swab needed.  Antibiotic in labor.   ASSESSMENT: 36w4d  PLAN: RTC weekly until delivery.    PAIGE

## 2022-05-09 NOTE — PROGRESS NOTES
Discharge Note    Progress reporting period is from initial evaluation date (please see noted date below) to Apr 4, 2022.  Linked Episodes   Type: Episode: Status: Noted: Resolved: Last update: Updated by:   PHYSICAL THERAPY Pelvic Floor 3/7/22 Active 3/7/2022  4/4/2022  2:09 PM Delores Al, PT      Comments:       Marielena failed to follow up and current status is unknown.  Please see information below for last relevant information on current status.  Patient seen for 2 visits.    SUBJECTIVE  Subjective changes noted by patient:  Patient reports pain has been worse since last session. Unable to lay down, sit, or stand for too long. Has not been wearing her maternity belt at home. HEP has not changed her sx. Pain with rolling in bed and supine to sit especially. SRIDHAR has improved to only leaking with sneeze  .  Current pain level is  .     Previous pain level was  9/10.   Changes in function:  Yes (See Goal flowsheet attached for changes in current functional level)  Adverse reaction to treatment or activity: None    OBJECTIVE  Changes noted in objective findings: Pubic symphysis TTP, L hip adduction painful     ASSESSMENT/PLAN  Diagnosis: Pubic symphysis pain during pregnancy   Updated problem list and treatment plan:   Pain - HEP  Decreased function - HEP  STG/LTGs have been met or progress has been made towards goals:  Yes, please see goal flowsheet for most current information  Assessment of Progress: current status is unknown.    Last current status: Pt is progressing as expected   Self Management Plans:  HEP  I have re-evaluated this patient and find that the nature, scope, duration and intensity of the therapy is appropriate for the medical condition of the patient.  Marielena continues to require the following intervention to meet STG and LTG's:  HEP.    Recommendations:  Discharge with current home program.  Patient to follow up with MD as needed.    Please refer to the daily flowsheet for treatment today,  total treatment time and time spent performing 1:1 timed codes.    Delores Al, PT, DPT

## 2022-05-13 ENCOUNTER — PRENATAL OFFICE VISIT (OUTPATIENT)
Dept: MIDWIFE SERVICES | Facility: CLINIC | Age: 27
End: 2022-05-13
Payer: COMMERCIAL

## 2022-05-13 VITALS
HEART RATE: 90 BPM | SYSTOLIC BLOOD PRESSURE: 108 MMHG | TEMPERATURE: 98.1 F | DIASTOLIC BLOOD PRESSURE: 68 MMHG | WEIGHT: 189 LBS | BODY MASS INDEX: 30.51 KG/M2

## 2022-05-13 DIAGNOSIS — Z34.83 ENCOUNTER FOR SUPERVISION OF OTHER NORMAL PREGNANCY IN THIRD TRIMESTER: Primary | ICD-10-CM

## 2022-05-13 DIAGNOSIS — O23.43 GROUP B STREPTOCOCCUS URINARY TRACT INFECTION AFFECTING PREGNANCY IN THIRD TRIMESTER: ICD-10-CM

## 2022-05-13 DIAGNOSIS — B95.1 GROUP B STREPTOCOCCUS URINARY TRACT INFECTION AFFECTING PREGNANCY IN THIRD TRIMESTER: ICD-10-CM

## 2022-05-13 PROCEDURE — 99207 PR PRENATAL VISIT: CPT | Performed by: ADVANCED PRACTICE MIDWIFE

## 2022-05-13 NOTE — PROGRESS NOTES
Here with her  and daughter.  She is not having any uncomfortable ctx but is having some pelvic pressure.  Hx of delivery prior to term.  Working.  GBS is positive.  Was treated with PCN with last pregnancy.  Open to epidural as had with 1st child.  ASSESSMENT: 37w4d  GBS positive.   PLAN: RTC weekly for PNC.

## 2022-05-20 ENCOUNTER — PRENATAL OFFICE VISIT (OUTPATIENT)
Dept: MIDWIFE SERVICES | Facility: CLINIC | Age: 27
End: 2022-05-20
Payer: COMMERCIAL

## 2022-05-20 VITALS
WEIGHT: 189.4 LBS | BODY MASS INDEX: 30.57 KG/M2 | HEART RATE: 82 BPM | SYSTOLIC BLOOD PRESSURE: 106 MMHG | DIASTOLIC BLOOD PRESSURE: 68 MMHG

## 2022-05-20 DIAGNOSIS — Z34.83 ENCOUNTER FOR SUPERVISION OF OTHER NORMAL PREGNANCY, THIRD TRIMESTER: Primary | ICD-10-CM

## 2022-05-20 PROCEDURE — 99207 PR PRENATAL VISIT: CPT | Performed by: ADVANCED PRACTICE MIDWIFE

## 2022-05-20 NOTE — PROGRESS NOTES
Marielena is here with Augusta for PNV.  Doing well.  Few additional ctx.  Fetus is active.  Nl BP.  Reviewed when to ccall. Come in for labor.  ASSESSMENT: 38w4d   PLAN: RTC next week.

## 2022-05-26 ENCOUNTER — PRENATAL OFFICE VISIT (OUTPATIENT)
Dept: MIDWIFE SERVICES | Facility: CLINIC | Age: 27
End: 2022-05-26
Payer: COMMERCIAL

## 2022-05-26 VITALS
WEIGHT: 188 LBS | OXYGEN SATURATION: 97 % | DIASTOLIC BLOOD PRESSURE: 70 MMHG | HEART RATE: 99 BPM | SYSTOLIC BLOOD PRESSURE: 116 MMHG | BODY MASS INDEX: 30.34 KG/M2

## 2022-05-26 DIAGNOSIS — Z34.83 ENCOUNTER FOR SUPERVISION OF OTHER NORMAL PREGNANCY, THIRD TRIMESTER: Primary | ICD-10-CM

## 2022-05-26 PROCEDURE — 99207 PR PRENATAL VISIT: CPT | Performed by: ADVANCED PRACTICE MIDWIFE

## 2022-05-26 PROCEDURE — 87653 STREP B DNA AMP PROBE: CPT | Performed by: ADVANCED PRACTICE MIDWIFE

## 2022-05-26 NOTE — PROGRESS NOTES
No additional ctx.  Active baby.  GBS today as not done due to being positive last pregnancy and misread that result as this pregnancy.   ASSESSMENT: 39w3d   PLAN: RTC in 1 wk and then post dates prn.

## 2022-05-28 LAB — GP B STREP DNA SPEC QL NAA+PROBE: POSITIVE

## 2022-06-01 ENCOUNTER — PRENATAL OFFICE VISIT (OUTPATIENT)
Dept: MIDWIFE SERVICES | Facility: CLINIC | Age: 27
End: 2022-06-01
Payer: COMMERCIAL

## 2022-06-01 VITALS
SYSTOLIC BLOOD PRESSURE: 107 MMHG | OXYGEN SATURATION: 98 % | HEART RATE: 116 BPM | BODY MASS INDEX: 30.67 KG/M2 | WEIGHT: 190 LBS | DIASTOLIC BLOOD PRESSURE: 62 MMHG

## 2022-06-01 DIAGNOSIS — O48.0 POST-TERM PREGNANCY, 40-42 WEEKS OF GESTATION: Primary | ICD-10-CM

## 2022-06-01 PROCEDURE — 99207 PR PRENATAL VISIT: CPT | Performed by: ADVANCED PRACTICE MIDWIFE

## 2022-06-02 ENCOUNTER — NURSE TRIAGE (OUTPATIENT)
Dept: NURSING | Facility: CLINIC | Age: 27
End: 2022-06-02
Payer: COMMERCIAL

## 2022-06-02 NOTE — TELEPHONE ENCOUNTER
"OB Triage Call      Is patient's OB/Midwife with the [facility] or [facility]? [facility]- Proceed with triage     Reason for call: 40w 3 d,  I am having a lot of cramps on my lower belly all day today. I feel them for a very long time, and then they stop and come back again. It feels different than contractions.     Assessment: Cramping began @ 9am when she woke up and continues all day. No bleeding or leaking of fluid. This is her 2nd baby. Pain=\"7-8\". She has not taken anything for the pain.      Plan: Home care per JYOTI on call instructions.     Patient plans to deliver at [place]    Patient's primary OB Provider is Russell Otero CNM  Per protocol recommendations Consult needed for [facility] or Midwife.  Patient's primary OB is Magi Nullife. Paged on-call midwife for patient's primary OB clinic (refer to where patient is seen as midwives may go to multiple locations) Melida Burroughs CNM  to call [facility] back at [phone].  Call returned at 6:33pm and advised on triage assessment. Does midwife recommend L&D evaluation? No- Per midwife on-call home care measures for now. .     Is patient's delivering hospital on divert? Does not apply due to Patient given home care instructions:  Try to help patient feel more comfortable, she may try a warm shower or tub bath, or heating pad, Tylenol as needed, stay hydrated and eat. She may be going into early labor. She should monitor for contractions. If there is a change in baby's movements, bleeding or leaking of fluid then she should be seen. Discomfort may be related to baby moving down into position.   This information was given to patient--she verbalized understanding.     40w3d    Estimated Date of Delivery: May 30, 2022        OB History    Para Term  AB Living   2 1 1 0 0 1   SAB IAB Ectopic Multiple Live Births   0 0 0 0 1      # Outcome Date GA Lbr Earl/2nd Weight Sex Delivery Anes PTL Lv   2 Current            1 Term 06/10/20 39w3d 11:45 / 01:56 2.95 kg (6 " "lb 8.1 oz) F Vag-Spont EPI, Local  ANGEL LUIS      Complications: Dysfunctional Labor, GBS      Name: Consuelo      Apgar1: 8  Apgar5: 9       Lab Results   Component Value Date    GBS Positive (A) 2020          Aydee Lieberman RN 22 6:20 PM  [facility] Nurse Advisor    Reason for Disposition    MODERATE-SEVERE abdominal pain (e.g., interferes with normal activities, awakens from sleep)    Additional Information    Negative: Passed out (i.e., lost consciousness, collapsed and was not responding)    Negative: Shock suspected (e.g., cold/pale/clammy skin, too weak to stand, low BP, rapid pulse)    Negative: Difficult to awaken or acting confused (e.g., disoriented, slurred speech)    Negative: [1] SEVERE abdominal pain (e.g., excruciating) AND [2] constant AND [3] present > 1 hour    Negative: SEVERE vaginal bleeding (e.g., continuous red blood from vagina, large blood clots)    Negative: Sounds like a life-threatening emergency to the triager    Negative: Followed an abdomen (stomach) injury    Negative: [1] Having contractions or other symptoms of labor (such as vaginal pressure) AND [2] >= 37 weeks pregnant (i.e., term pregnancy)    Negative: [1] Having contractions or other symptoms of labor (such as vaginal pressure) AND [2] < 37 weeks pregnant (i.e., )    Negative: [1] Abdominal pain AND [2] pregnant < 20 weeks    Negative: [1] Vomiting AND [2] contains red blood or black (\"coffee ground\") material  (Exception: few red streaks in vomit that only happened once)    Protocols used: PREGNANCY - ABDOMINAL PAIN GREATER THAN 20 WEEKS EGA-A-AH      "

## 2022-06-03 ENCOUNTER — TELEPHONE (OUTPATIENT)
Dept: MIDWIFE SERVICES | Facility: CLINIC | Age: 27
End: 2022-06-03
Payer: COMMERCIAL

## 2022-06-03 DIAGNOSIS — Z34.83 ENCOUNTER FOR SUPERVISION OF OTHER NORMAL PREGNANCY IN THIRD TRIMESTER: Primary | ICD-10-CM

## 2022-06-03 NOTE — TELEPHONE ENCOUNTER
Call to pt,    Monday 6/6/22 is full for IOL so scheduled for Tuesday 6/7/22 @ 1100.  Pt plans to come for post dates testing on 6/6/22 and will do covid test that day.    Will place and future order now.    ELIZABETH Gilliam CNM    Self

## 2022-06-03 NOTE — TELEPHONE ENCOUNTER
Due for repeat thyroid US for continued surveillance Will route to the provider to review and advise.   Barbie RIVERO RN

## 2022-06-04 ENCOUNTER — LAB (OUTPATIENT)
Dept: LAB | Facility: CLINIC | Age: 27
End: 2022-06-04
Attending: ADVANCED PRACTICE MIDWIFE
Payer: COMMERCIAL

## 2022-06-04 DIAGNOSIS — Z34.83 ENCOUNTER FOR SUPERVISION OF OTHER NORMAL PREGNANCY IN THIRD TRIMESTER: ICD-10-CM

## 2022-06-04 PROCEDURE — U0003 INFECTIOUS AGENT DETECTION BY NUCLEIC ACID (DNA OR RNA); SEVERE ACUTE RESPIRATORY SYNDROME CORONAVIRUS 2 (SARS-COV-2) (CORONAVIRUS DISEASE [COVID-19]), AMPLIFIED PROBE TECHNIQUE, MAKING USE OF HIGH THROUGHPUT TECHNOLOGIES AS DESCRIBED BY CMS-2020-01-R: HCPCS

## 2022-06-05 LAB — SARS-COV-2 RNA RESP QL NAA+PROBE: NEGATIVE

## 2022-06-06 ENCOUNTER — PRENATAL OFFICE VISIT (OUTPATIENT)
Dept: MIDWIFE SERVICES | Facility: CLINIC | Age: 27
End: 2022-06-06
Payer: COMMERCIAL

## 2022-06-06 ENCOUNTER — PRENATAL OFFICE VISIT (OUTPATIENT)
Dept: OBGYN | Facility: CLINIC | Age: 27
End: 2022-06-06

## 2022-06-06 VITALS
DIASTOLIC BLOOD PRESSURE: 72 MMHG | HEART RATE: 101 BPM | OXYGEN SATURATION: 97 % | BODY MASS INDEX: 30.51 KG/M2 | SYSTOLIC BLOOD PRESSURE: 110 MMHG | WEIGHT: 189 LBS

## 2022-06-06 DIAGNOSIS — Z34.83 ENCOUNTER FOR SUPERVISION OF OTHER NORMAL PREGNANCY, THIRD TRIMESTER: Primary | ICD-10-CM

## 2022-06-06 PROCEDURE — 99207 PR PRENATAL VISIT: CPT | Performed by: ADVANCED PRACTICE MIDWIFE

## 2022-06-06 PROCEDURE — 59025 FETAL NON-STRESS TEST: CPT | Performed by: ADVANCED PRACTICE MIDWIFE

## 2022-06-06 NOTE — PROGRESS NOTES
41w0d  Feeling well. BPP today is 8/8 with MVP of 2.08. has induction scheduled tomorrow at 11am. Covid test neg on 6/4. NST don for posdates with reactive results. Baseline 125 with accels. No decels. Irregular contractions not felt by patient. Reports good fetal movement. Denies leaking of fluid, vaginal bleeding, regular uterine contractions, headache or other concerns.  RTC tomorrow for IOL> HKJ

## 2022-06-07 ENCOUNTER — HOSPITAL ENCOUNTER (INPATIENT)
Facility: CLINIC | Age: 27
LOS: 2 days | Discharge: HOME | End: 2022-06-09
Attending: ADVANCED PRACTICE MIDWIFE | Admitting: ADVANCED PRACTICE MIDWIFE
Payer: COMMERCIAL

## 2022-06-07 ENCOUNTER — ANESTHESIA (OUTPATIENT)
Dept: OBGYN | Facility: CLINIC | Age: 27
End: 2022-06-07
Payer: COMMERCIAL

## 2022-06-07 ENCOUNTER — ANESTHESIA EVENT (OUTPATIENT)
Dept: OBGYN | Facility: CLINIC | Age: 27
End: 2022-06-07
Payer: COMMERCIAL

## 2022-06-07 DIAGNOSIS — D62 ANEMIA DUE TO BLOOD LOSS, ACUTE: ICD-10-CM

## 2022-06-07 PROBLEM — Z34.90 ENCOUNTER FOR INDUCTION OF LABOR: Status: ACTIVE | Noted: 2022-06-07

## 2022-06-07 LAB
ABO/RH(D): NORMAL
ANTIBODY SCREEN: NEGATIVE
BASOPHILS # BLD MANUAL: 0 10E3/UL (ref 0–0.2)
BASOPHILS NFR BLD MANUAL: 0 %
EOSINOPHIL # BLD MANUAL: 0.1 10E3/UL (ref 0–0.7)
EOSINOPHIL NFR BLD MANUAL: 1 %
ERYTHROCYTE [DISTWIDTH] IN BLOOD BY AUTOMATED COUNT: 15.6 % (ref 10–15)
HCT VFR BLD AUTO: 33.3 % (ref 35–47)
HGB BLD-MCNC: 11 G/DL (ref 11.7–15.7)
LYMPHOCYTES # BLD MANUAL: 1.8 10E3/UL (ref 0.8–5.3)
LYMPHOCYTES NFR BLD MANUAL: 18 %
MCH RBC QN AUTO: 26.3 PG (ref 26.5–33)
MCHC RBC AUTO-ENTMCNC: 33 G/DL (ref 31.5–36.5)
MCV RBC AUTO: 80 FL (ref 78–100)
MONOCYTES # BLD MANUAL: 0.4 10E3/UL (ref 0–1.3)
MONOCYTES NFR BLD MANUAL: 4 %
MYELOCYTES # BLD MANUAL: 0.1 10E3/UL
MYELOCYTES NFR BLD MANUAL: 1 %
NEUTROPHILS # BLD MANUAL: 7.8 10E3/UL (ref 1.6–8.3)
NEUTROPHILS NFR BLD MANUAL: 76 %
PLAT MORPH BLD: ABNORMAL
PLATELET # BLD AUTO: 195 10E3/UL (ref 150–450)
RBC # BLD AUTO: 4.18 10E6/UL (ref 3.8–5.2)
RBC MORPH BLD: ABNORMAL
SPECIMEN EXPIRATION DATE: NORMAL
WBC # BLD AUTO: 10.2 10E3/UL (ref 4–11)

## 2022-06-07 PROCEDURE — 85014 HEMATOCRIT: CPT | Performed by: ADVANCED PRACTICE MIDWIFE

## 2022-06-07 PROCEDURE — 3E0P7VZ INTRODUCTION OF HORMONE INTO FEMALE REPRODUCTIVE, VIA NATURAL OR ARTIFICIAL OPENING: ICD-10-PCS | Performed by: ADVANCED PRACTICE MIDWIFE

## 2022-06-07 PROCEDURE — 59025 FETAL NON-STRESS TEST: CPT | Mod: 26 | Performed by: ADVANCED PRACTICE MIDWIFE

## 2022-06-07 PROCEDURE — 250N000011 HC RX IP 250 OP 636: Performed by: ADVANCED PRACTICE MIDWIFE

## 2022-06-07 PROCEDURE — 370N000003 HC ANESTHESIA WARD SERVICE

## 2022-06-07 PROCEDURE — 86780 TREPONEMA PALLIDUM: CPT | Performed by: ADVANCED PRACTICE MIDWIFE

## 2022-06-07 PROCEDURE — 85007 BL SMEAR W/DIFF WBC COUNT: CPT | Performed by: ADVANCED PRACTICE MIDWIFE

## 2022-06-07 PROCEDURE — 258N000003 HC RX IP 258 OP 636: Performed by: ADVANCED PRACTICE MIDWIFE

## 2022-06-07 PROCEDURE — G0463 HOSPITAL OUTPT CLINIC VISIT: HCPCS

## 2022-06-07 PROCEDURE — 86850 RBC ANTIBODY SCREEN: CPT | Performed by: ADVANCED PRACTICE MIDWIFE

## 2022-06-07 PROCEDURE — 250N000013 HC RX MED GY IP 250 OP 250 PS 637: Performed by: ADVANCED PRACTICE MIDWIFE

## 2022-06-07 PROCEDURE — 120N000002 HC R&B MED SURG/OB UMMC

## 2022-06-07 RX ORDER — PENICILLIN G 3000000 [IU]/50ML
3 INJECTION, SOLUTION INTRAVENOUS EVERY 4 HOURS
Status: DISCONTINUED | OUTPATIENT
Start: 2022-06-07 | End: 2022-06-08 | Stop reason: HOSPADM

## 2022-06-07 RX ORDER — METHYLERGONOVINE MALEATE 0.2 MG/ML
200 INJECTION INTRAVENOUS
Status: DISCONTINUED | OUTPATIENT
Start: 2022-06-07 | End: 2022-06-08 | Stop reason: HOSPADM

## 2022-06-07 RX ORDER — MISOPROSTOL 200 UG/1
TABLET ORAL
Status: DISCONTINUED
Start: 2022-06-07 | End: 2022-06-08 | Stop reason: HOSPADM

## 2022-06-07 RX ORDER — PROCHLORPERAZINE 25 MG
25 SUPPOSITORY, RECTAL RECTAL EVERY 12 HOURS PRN
Status: DISCONTINUED | OUTPATIENT
Start: 2022-06-07 | End: 2022-06-08 | Stop reason: HOSPADM

## 2022-06-07 RX ORDER — PROCHLORPERAZINE MALEATE 10 MG
10 TABLET ORAL EVERY 6 HOURS PRN
Status: DISCONTINUED | OUTPATIENT
Start: 2022-06-07 | End: 2022-06-08 | Stop reason: HOSPADM

## 2022-06-07 RX ORDER — CITRIC ACID/SODIUM CITRATE 334-500MG
30 SOLUTION, ORAL ORAL
Status: DISCONTINUED | OUTPATIENT
Start: 2022-06-07 | End: 2022-06-08 | Stop reason: HOSPADM

## 2022-06-07 RX ORDER — KETOROLAC TROMETHAMINE 30 MG/ML
30 INJECTION, SOLUTION INTRAMUSCULAR; INTRAVENOUS
Status: DISCONTINUED | OUTPATIENT
Start: 2022-06-07 | End: 2022-06-09 | Stop reason: HOSPADM

## 2022-06-07 RX ORDER — NALOXONE HYDROCHLORIDE 0.4 MG/ML
0.2 INJECTION, SOLUTION INTRAMUSCULAR; INTRAVENOUS; SUBCUTANEOUS
Status: DISCONTINUED | OUTPATIENT
Start: 2022-06-07 | End: 2022-06-08 | Stop reason: HOSPADM

## 2022-06-07 RX ORDER — TRANEXAMIC ACID 10 MG/ML
1 INJECTION, SOLUTION INTRAVENOUS EVERY 30 MIN PRN
Status: DISCONTINUED | OUTPATIENT
Start: 2022-06-07 | End: 2022-06-08 | Stop reason: HOSPADM

## 2022-06-07 RX ORDER — METOCLOPRAMIDE HYDROCHLORIDE 5 MG/ML
10 INJECTION INTRAMUSCULAR; INTRAVENOUS EVERY 6 HOURS PRN
Status: DISCONTINUED | OUTPATIENT
Start: 2022-06-07 | End: 2022-06-08 | Stop reason: HOSPADM

## 2022-06-07 RX ORDER — IBUPROFEN 800 MG/1
800 TABLET, FILM COATED ORAL
Status: DISCONTINUED | OUTPATIENT
Start: 2022-06-07 | End: 2022-06-09 | Stop reason: HOSPADM

## 2022-06-07 RX ORDER — NALOXONE HYDROCHLORIDE 0.4 MG/ML
0.4 INJECTION, SOLUTION INTRAMUSCULAR; INTRAVENOUS; SUBCUTANEOUS
Status: DISCONTINUED | OUTPATIENT
Start: 2022-06-07 | End: 2022-06-08 | Stop reason: HOSPADM

## 2022-06-07 RX ORDER — OXYTOCIN 10 [USP'U]/ML
10 INJECTION, SOLUTION INTRAMUSCULAR; INTRAVENOUS
Status: DISCONTINUED | OUTPATIENT
Start: 2022-06-07 | End: 2022-06-08 | Stop reason: HOSPADM

## 2022-06-07 RX ORDER — OXYTOCIN/0.9 % SODIUM CHLORIDE 30/500 ML
PLASTIC BAG, INJECTION (ML) INTRAVENOUS
Status: DISCONTINUED
Start: 2022-06-07 | End: 2022-06-08 | Stop reason: HOSPADM

## 2022-06-07 RX ORDER — OXYTOCIN/0.9 % SODIUM CHLORIDE 30/500 ML
340 PLASTIC BAG, INJECTION (ML) INTRAVENOUS CONTINUOUS PRN
Status: DISCONTINUED | OUTPATIENT
Start: 2022-06-07 | End: 2022-06-08 | Stop reason: HOSPADM

## 2022-06-07 RX ORDER — MISOPROSTOL 200 UG/1
400 TABLET ORAL
Status: DISCONTINUED | OUTPATIENT
Start: 2022-06-07 | End: 2022-06-08 | Stop reason: HOSPADM

## 2022-06-07 RX ORDER — FENTANYL CITRATE-0.9 % NACL/PF 10 MCG/ML
100 PLASTIC BAG, INJECTION (ML) INTRAVENOUS EVERY 5 MIN PRN
Status: DISCONTINUED | OUTPATIENT
Start: 2022-06-07 | End: 2022-06-08 | Stop reason: HOSPADM

## 2022-06-07 RX ORDER — METOCLOPRAMIDE 10 MG/1
10 TABLET ORAL EVERY 6 HOURS PRN
Status: DISCONTINUED | OUTPATIENT
Start: 2022-06-07 | End: 2022-06-08 | Stop reason: HOSPADM

## 2022-06-07 RX ORDER — LIDOCAINE HYDROCHLORIDE 10 MG/ML
INJECTION, SOLUTION EPIDURAL; INFILTRATION; INTRACAUDAL; PERINEURAL
Status: DISCONTINUED
Start: 2022-06-07 | End: 2022-06-08 | Stop reason: HOSPADM

## 2022-06-07 RX ORDER — LIDOCAINE 40 MG/G
CREAM TOPICAL
Status: DISCONTINUED | OUTPATIENT
Start: 2022-06-07 | End: 2022-06-08 | Stop reason: HOSPADM

## 2022-06-07 RX ORDER — ONDANSETRON 2 MG/ML
4 INJECTION INTRAMUSCULAR; INTRAVENOUS EVERY 6 HOURS PRN
Status: DISCONTINUED | OUTPATIENT
Start: 2022-06-07 | End: 2022-06-08 | Stop reason: HOSPADM

## 2022-06-07 RX ORDER — FENTANYL/ROPIVACAINE/NS/PF 2MCG/ML-.1
PLASTIC BAG, INJECTION (ML) EPIDURAL
Status: DISCONTINUED | OUTPATIENT
Start: 2022-06-07 | End: 2022-06-08 | Stop reason: HOSPADM

## 2022-06-07 RX ORDER — ONDANSETRON 4 MG/1
4 TABLET, ORALLY DISINTEGRATING ORAL EVERY 6 HOURS PRN
Status: DISCONTINUED | OUTPATIENT
Start: 2022-06-07 | End: 2022-06-08 | Stop reason: HOSPADM

## 2022-06-07 RX ORDER — NALBUPHINE HYDROCHLORIDE 20 MG/ML
2.5-5 INJECTION, SOLUTION INTRAMUSCULAR; INTRAVENOUS; SUBCUTANEOUS EVERY 6 HOURS PRN
Status: DISCONTINUED | OUTPATIENT
Start: 2022-06-07 | End: 2022-06-09 | Stop reason: HOSPADM

## 2022-06-07 RX ORDER — MISOPROSTOL 100 UG/1
25 TABLET ORAL EVERY 4 HOURS PRN
Status: DISCONTINUED | OUTPATIENT
Start: 2022-06-07 | End: 2022-06-08 | Stop reason: HOSPADM

## 2022-06-07 RX ORDER — ACETAMINOPHEN 325 MG/1
650 TABLET ORAL EVERY 4 HOURS PRN
Status: DISCONTINUED | OUTPATIENT
Start: 2022-06-07 | End: 2022-06-08 | Stop reason: HOSPADM

## 2022-06-07 RX ORDER — MISOPROSTOL 200 UG/1
800 TABLET ORAL
Status: DISCONTINUED | OUTPATIENT
Start: 2022-06-07 | End: 2022-06-08 | Stop reason: HOSPADM

## 2022-06-07 RX ORDER — OXYTOCIN 10 [USP'U]/ML
10 INJECTION, SOLUTION INTRAMUSCULAR; INTRAVENOUS
Status: DISCONTINUED | OUTPATIENT
Start: 2022-06-07 | End: 2022-06-09 | Stop reason: HOSPADM

## 2022-06-07 RX ORDER — CARBOPROST TROMETHAMINE 250 UG/ML
250 INJECTION, SOLUTION INTRAMUSCULAR
Status: DISCONTINUED | OUTPATIENT
Start: 2022-06-07 | End: 2022-06-08 | Stop reason: HOSPADM

## 2022-06-07 RX ORDER — FENTANYL CITRATE 50 UG/ML
100 INJECTION, SOLUTION INTRAMUSCULAR; INTRAVENOUS
Status: DISCONTINUED | OUTPATIENT
Start: 2022-06-07 | End: 2022-06-08 | Stop reason: HOSPADM

## 2022-06-07 RX ORDER — PENICILLIN G POTASSIUM 5000000 [IU]/1
5 INJECTION, POWDER, FOR SOLUTION INTRAMUSCULAR; INTRAVENOUS ONCE
Status: COMPLETED | OUTPATIENT
Start: 2022-06-07 | End: 2022-06-07

## 2022-06-07 RX ORDER — OXYTOCIN/0.9 % SODIUM CHLORIDE 30/500 ML
100-340 PLASTIC BAG, INJECTION (ML) INTRAVENOUS CONTINUOUS PRN
Status: DISCONTINUED | OUTPATIENT
Start: 2022-06-07 | End: 2022-06-09 | Stop reason: HOSPADM

## 2022-06-07 RX ORDER — TERBUTALINE SULFATE 1 MG/ML
0.25 INJECTION, SOLUTION SUBCUTANEOUS
Status: DISCONTINUED | OUTPATIENT
Start: 2022-06-07 | End: 2022-06-08 | Stop reason: HOSPADM

## 2022-06-07 RX ORDER — OXYTOCIN 10 [USP'U]/ML
INJECTION, SOLUTION INTRAMUSCULAR; INTRAVENOUS
Status: DISCONTINUED
Start: 2022-06-07 | End: 2022-06-08 | Stop reason: HOSPADM

## 2022-06-07 RX ADMIN — MISOPROSTOL 25 MCG: 100 TABLET ORAL at 19:14

## 2022-06-07 RX ADMIN — PENICILLIN G POTASSIUM 5 MILLION UNITS: 5000000 POWDER, FOR SOLUTION INTRAMUSCULAR; INTRAPLEURAL; INTRATHECAL; INTRAVENOUS at 20:00

## 2022-06-07 RX ADMIN — SODIUM CHLORIDE, POTASSIUM CHLORIDE, SODIUM LACTATE AND CALCIUM CHLORIDE 1000 ML: 600; 310; 30; 20 INJECTION, SOLUTION INTRAVENOUS at 19:59

## 2022-06-07 RX ADMIN — MISOPROSTOL 25 MCG: 100 TABLET ORAL at 15:20

## 2022-06-07 ASSESSMENT — ACTIVITIES OF DAILY LIVING (ADL)
ADLS_ACUITY_SCORE: 20

## 2022-06-07 NOTE — PLAN OF CARE
Marielena admitted for IOL for PD and Mild Poly. VSS, afebrile. See flowsheet for FHT and UC documentation. Pain toleratable with rest and position support. Membranes intact. SVE attempted when she arrived, cervix was posterior and pt did not tolerate it. Miso given x2. Spouse at bedside and supportive. Will continue to monitor closely.

## 2022-06-07 NOTE — PLAN OF CARE
Data: Patient admitted to room 444 at 1409 . Patient is a . Prenatal record reviewed.   OB History    Para Term  AB Living   2 1 1 0 0 1   SAB IAB Ectopic Multiple Live Births   0 0 0 0 1      # Outcome Date GA Lbr Earl/2nd Weight Sex Delivery Anes PTL Lv   2 Current            1 Term 06/10/20 39w3d 11:45 / 01:56 2.95 kg (6 lb 8.1 oz) F Vag-Spont EPI, Local  ANGEL LUIS      Complications: Dysfunctional Labor, GBS      Name: Cnosuelo      Apgar1: 8  Apgar5: 9   .  Medical History:   Past Medical History:   Diagnosis Date     Carrier of group B Streptococcus    .  Gestational age 41w1d. Vital signs per doc flowsheet. Fetal movement present. Patient reports Induction Of Labor   as reason for admission. Support persons [place] present.  Action: Care of patient assumed at 1409. Verbal consent for EFM, external fetal monitors applied. Admission assessment completed. Patient and support persons educated on labor process. Patient instructed to report change in fetal movement, contractions, vaginal leaking of fluid or bleeding, abdominal pain, or any concerns related to the pregnancy to her nurse/physician. Patient oriented to room, call light in reach.   Response: Aury informed of arrival. Plan per provider is admit and start IOL. Patient verbalized understanding of education and verbalized agreement with plan. Patient coping with labor via rest.

## 2022-06-07 NOTE — H&P
Marielena Rodriguez is a 26 year old female,      Patient's last menstrual period was 2021., Estimated Date of Delivery: May 30, 2022 is calculated from lmp and verified with 12 week US.    [facility] at 3:05 PM  for induction of labor.  Indication postdates. Marielena had postdates testing yesterday  with BPP 8/8 and MARI 4.4cm. MVP is 2.08cm. Borderline oligohydramnios, and IOL was recommended. Membranes are intact    PRENATAL COURSE  Prenatal care began at 6 wks gestation for a total of 14 prenatal visits.  Total wt gain 24lb  Prenatal vital signs WNL  Prenatal course was   complicated by    Patient Active Problem List    Diagnosis Date Noted     Encounter for induction of labor 2022     Priority: Medium     Encounter for triage in pregnant patient 2022     Priority: Medium     Need for Tdap vaccination 10/05/2021     Priority: Medium     Group B Streptococcus urinary tract infection affecting pregnancy in third trimester 06/10/2020     Priority: Medium     Adequately treated in labor with PCN.       Nausea and vomiting in pregnancy prior to 22 weeks gestation 2019     Priority: Medium     Bilateral thoracic back pain 2019     Priority: Medium       HISTORIES  No Known Allergies  Past Medical History:   Diagnosis Date     Carrier of group B Streptococcus      Past Surgical History:   Procedure Laterality Date     NO HISTORY OF SURGERY  12/10/2018     Family History   Problem Relation Age of Onset     No Known Problems Mother      No Known Problems Father      Diabetes Maternal Grandmother      Social History     Tobacco Use     Smoking status: Never Smoker     Smokeless tobacco: Never Used   Substance Use Topics     Alcohol use: No     OB History    Para Term  AB Living   2 1 1 0 0 1   SAB IAB Ectopic Multiple Live Births   0 0 0 0 1      # Outcome Date GA Lbr Earl/2nd Weight Sex Delivery Anes PTL Lv   2 Current            1 Term  06/10/20 39w3d 11:45 / 01:56 2.95 kg (6 lb 8.1 oz) F Vag-Spont EPI, Local  ANGEL LUIS      Complications: Dysfunctional Labor, GBS      Name: Consuelo      Apgar1: 8  Apgar5: 9       LABS:  Lab Results   Component Value Date    ABO O 2020       Lab Results   Component Value Date    RH Pos 2020     Rhogam not indicated  Rubella immune  RPR nonreactive  HIV nonreactive  Lab Results   Component Value Date    HGB 11.1 2022    HGB 11.6 2020      Lab Results   Component Value Date    HEPBANG Nonreactive 2021    HEPBANG Nonreactive 2019     Lab Results   Component Value Date    GBS Positive 2020       ROS  Pt denies significant constitutional symptoms including fever and/or malaise.  Pt denies significant respiratory, cardiovacular, GI, or muscular/skeletal complaints.      PHYSICAL EXAM:  Vital signs stable, afebrile and BP is 113/71  General appearance healthy, alert and active  Heart: RRR without murmur  Lungs:  clear to auscultation   Neuro:  denies headache and visual disturbances  Psych: Mentation normal and bright   Legs: 2+/2+, no clonus, no edema     Abdomen: gravid, single vertex fetus, non-tender, EFW 7 lbs. Pt is not feeling contractions, though occasionally detected on toco    FETAL HEART TONES: baseline 135 with moderate FHRV variability and accelerations. No decelerations present.     PELVIC EXAM: unable to reach cervical os, cervix is very posterior, soft  BLOODY SHOW:: no  Membranes as listed above    ASSESSMENT:   41w1d  Postdates  Borderline oligohydramnios  NST reactive   Fetal-maternal status reassuring  Parity: Multip  GBS positive and membranes intact    PLAN:  Admit - see IP orders  cervical ripening with vaginal miso  Patient and  questions answered, discussed IOL process    Aury Gallo, ELIZABETH MONTES

## 2022-06-08 LAB — T PALLIDUM AB SER QL: NONREACTIVE

## 2022-06-08 PROCEDURE — 250N000013 HC RX MED GY IP 250 OP 250 PS 637: Performed by: ADVANCED PRACTICE MIDWIFE

## 2022-06-08 PROCEDURE — 250N000009 HC RX 250: Performed by: ANESTHESIOLOGY

## 2022-06-08 PROCEDURE — 258N000003 HC RX IP 258 OP 636

## 2022-06-08 PROCEDURE — 722N000001 HC LABOR CARE VAGINAL DELIVERY SINGLE

## 2022-06-08 PROCEDURE — 3E0R3BZ INTRODUCTION OF ANESTHETIC AGENT INTO SPINAL CANAL, PERCUTANEOUS APPROACH: ICD-10-PCS | Performed by: ANESTHESIOLOGY

## 2022-06-08 PROCEDURE — 250N000011 HC RX IP 250 OP 636: Performed by: ANESTHESIOLOGY

## 2022-06-08 PROCEDURE — 59400 OBSTETRICAL CARE: CPT | Performed by: ADVANCED PRACTICE MIDWIFE

## 2022-06-08 PROCEDURE — 120N000002 HC R&B MED SURG/OB UMMC

## 2022-06-08 PROCEDURE — 250N000011 HC RX IP 250 OP 636: Performed by: ADVANCED PRACTICE MIDWIFE

## 2022-06-08 PROCEDURE — 258N000003 HC RX IP 258 OP 636: Performed by: ANESTHESIOLOGY

## 2022-06-08 PROCEDURE — 250N000009 HC RX 250: Performed by: ADVANCED PRACTICE MIDWIFE

## 2022-06-08 PROCEDURE — 0HQ9XZZ REPAIR PERINEUM SKIN, EXTERNAL APPROACH: ICD-10-PCS | Performed by: ADVANCED PRACTICE MIDWIFE

## 2022-06-08 PROCEDURE — 00HU33Z INSERTION OF INFUSION DEVICE INTO SPINAL CANAL, PERCUTANEOUS APPROACH: ICD-10-PCS | Performed by: ANESTHESIOLOGY

## 2022-06-08 RX ORDER — OXYTOCIN/0.9 % SODIUM CHLORIDE 30/500 ML
340 PLASTIC BAG, INJECTION (ML) INTRAVENOUS CONTINUOUS PRN
Status: DISCONTINUED | OUTPATIENT
Start: 2022-06-08 | End: 2022-06-09 | Stop reason: HOSPADM

## 2022-06-08 RX ORDER — DOCUSATE SODIUM 100 MG/1
100 CAPSULE, LIQUID FILLED ORAL DAILY
Status: DISCONTINUED | OUTPATIENT
Start: 2022-06-08 | End: 2022-06-09 | Stop reason: HOSPADM

## 2022-06-08 RX ORDER — HYDROCORTISONE 25 MG/G
CREAM TOPICAL 3 TIMES DAILY PRN
Status: DISCONTINUED | OUTPATIENT
Start: 2022-06-08 | End: 2022-06-09 | Stop reason: HOSPADM

## 2022-06-08 RX ORDER — BISACODYL 10 MG
10 SUPPOSITORY, RECTAL RECTAL DAILY PRN
Status: DISCONTINUED | OUTPATIENT
Start: 2022-06-08 | End: 2022-06-09 | Stop reason: HOSPADM

## 2022-06-08 RX ORDER — OXYTOCIN 10 [USP'U]/ML
10 INJECTION, SOLUTION INTRAMUSCULAR; INTRAVENOUS
Status: DISCONTINUED | OUTPATIENT
Start: 2022-06-08 | End: 2022-06-09 | Stop reason: HOSPADM

## 2022-06-08 RX ORDER — MISOPROSTOL 200 UG/1
800 TABLET ORAL
Status: DISCONTINUED | OUTPATIENT
Start: 2022-06-08 | End: 2022-06-09 | Stop reason: HOSPADM

## 2022-06-08 RX ORDER — ACETAMINOPHEN 325 MG/1
650 TABLET ORAL EVERY 4 HOURS PRN
Status: DISCONTINUED | OUTPATIENT
Start: 2022-06-08 | End: 2022-06-09 | Stop reason: HOSPADM

## 2022-06-08 RX ORDER — CALCIUM CARBONATE 500 MG/1
500 TABLET, CHEWABLE ORAL DAILY PRN
Status: DISCONTINUED | OUTPATIENT
Start: 2022-06-08 | End: 2022-06-09 | Stop reason: HOSPADM

## 2022-06-08 RX ORDER — MISOPROSTOL 200 UG/1
400 TABLET ORAL
Status: DISCONTINUED | OUTPATIENT
Start: 2022-06-08 | End: 2022-06-09 | Stop reason: HOSPADM

## 2022-06-08 RX ORDER — MODIFIED LANOLIN
OINTMENT (GRAM) TOPICAL
Status: DISCONTINUED | OUTPATIENT
Start: 2022-06-08 | End: 2022-06-09 | Stop reason: HOSPADM

## 2022-06-08 RX ORDER — IBUPROFEN 800 MG/1
800 TABLET, FILM COATED ORAL EVERY 6 HOURS PRN
Status: DISCONTINUED | OUTPATIENT
Start: 2022-06-08 | End: 2022-06-09 | Stop reason: HOSPADM

## 2022-06-08 RX ORDER — METHYLERGONOVINE MALEATE 0.2 MG/ML
200 INJECTION INTRAVENOUS
Status: DISCONTINUED | OUTPATIENT
Start: 2022-06-08 | End: 2022-06-09 | Stop reason: HOSPADM

## 2022-06-08 RX ORDER — CARBOPROST TROMETHAMINE 250 UG/ML
250 INJECTION, SOLUTION INTRAMUSCULAR
Status: DISCONTINUED | OUTPATIENT
Start: 2022-06-08 | End: 2022-06-09 | Stop reason: HOSPADM

## 2022-06-08 RX ORDER — SODIUM CHLORIDE, SODIUM LACTATE, POTASSIUM CHLORIDE, CALCIUM CHLORIDE 600; 310; 30; 20 MG/100ML; MG/100ML; MG/100ML; MG/100ML
INJECTION, SOLUTION INTRAVENOUS
Status: COMPLETED
Start: 2022-06-08 | End: 2022-06-08

## 2022-06-08 RX ORDER — TRANEXAMIC ACID 10 MG/ML
1 INJECTION, SOLUTION INTRAVENOUS EVERY 30 MIN PRN
Status: DISCONTINUED | OUTPATIENT
Start: 2022-06-08 | End: 2022-06-09 | Stop reason: HOSPADM

## 2022-06-08 RX ADMIN — PENICILLIN G 3 MILLION UNITS: 3000000 INJECTION, SOLUTION INTRAVENOUS at 03:54

## 2022-06-08 RX ADMIN — Medication 5 ML: at 02:01

## 2022-06-08 RX ADMIN — KETOROLAC TROMETHAMINE 30 MG: 30 INJECTION, SOLUTION INTRAMUSCULAR at 08:27

## 2022-06-08 RX ADMIN — Medication 340 ML/HR: at 05:45

## 2022-06-08 RX ADMIN — ACETAMINOPHEN 325MG 650 MG: 325 TABLET ORAL at 23:37

## 2022-06-08 RX ADMIN — FENTANYL CITRATE 100 MCG: 50 INJECTION, SOLUTION INTRAMUSCULAR; INTRAVENOUS at 00:04

## 2022-06-08 RX ADMIN — PENICILLIN G 3 MILLION UNITS: 3000000 INJECTION, SOLUTION INTRAVENOUS at 00:09

## 2022-06-08 RX ADMIN — IBUPROFEN 800 MG: 800 TABLET, FILM COATED ORAL at 23:37

## 2022-06-08 RX ADMIN — ROPIVACAINE HYDROCHLORIDE: 2 INJECTION, SOLUTION EPIDURAL; INFILTRATION at 01:25

## 2022-06-08 RX ADMIN — IBUPROFEN 800 MG: 800 TABLET, FILM COATED ORAL at 16:16

## 2022-06-08 RX ADMIN — Medication 100 MCG: at 02:17

## 2022-06-08 RX ADMIN — SODIUM CHLORIDE, POTASSIUM CHLORIDE, SODIUM LACTATE AND CALCIUM CHLORIDE 1000 ML: 600; 310; 30; 20 INJECTION, SOLUTION INTRAVENOUS at 03:54

## 2022-06-08 RX ADMIN — DOCUSATE SODIUM 100 MG: 100 CAPSULE, LIQUID FILLED ORAL at 10:10

## 2022-06-08 RX ADMIN — ACETAMINOPHEN 325MG 650 MG: 325 TABLET ORAL at 10:11

## 2022-06-08 RX ADMIN — Medication 10 ML: at 01:18

## 2022-06-08 RX ADMIN — LIDOCAINE HYDROCHLORIDE 20 ML: 10 INJECTION, SOLUTION EPIDURAL; INFILTRATION; INTRACAUDAL; PERINEURAL at 05:56

## 2022-06-08 ASSESSMENT — ACTIVITIES OF DAILY LIVING (ADL)
ADLS_ACUITY_SCORE: 20
ADLS_ACUITY_SCORE: 22
ADLS_ACUITY_SCORE: 20
ADLS_ACUITY_SCORE: 20
ADLS_ACUITY_SCORE: 22
ADLS_ACUITY_SCORE: 20
ADLS_ACUITY_SCORE: 22
ADLS_ACUITY_SCORE: 20
ADLS_ACUITY_SCORE: 22

## 2022-06-08 NOTE — ANESTHESIA PROCEDURE NOTES
Epidural catheter Procedure Note    Pre-Procedure   Staff -        Anesthesiologist:  China Diallo MD       Resident/Fellow: Richard Kennedy MD       Performed By: resident       Location: OB       Procedure Start/Stop Times: 6/8/2022 1:05 AM and 6/8/2022 1:20 AM       Pre-Anesthestic Checklist: patient identified, IV checked, risks and benefits discussed, informed consent, monitors and equipment checked, pre-op evaluation, at physician/surgeon's request and post-op pain management  Timeout:       Correct Patient: Yes        Correct Procedure: Yes        Correct Site: Yes        Correct Position: Yes   Procedure Documentation  Procedure: epidural catheter       Patient Position: sitting       Skin prep: Chloraprep       Local skin infiltrated with mL of 1% lidocaine.        Insertion Site: L3-4. (midline approach).       Technique: LORT air        LARRY at 6.5 cm.       Needle Type: Touhy needle       Needle Gauge: 17.        Needle Length (Inches): 5        Catheter: 18 G.          Catheter threaded easily.         3.5 cm epidural space.         Threaded 10 cm at skin.         # of attempts: 1 and  # of redirects:  1    Assessment/Narrative         Paresthesias: No.       Test dose of 3 mL lidocaine 1.5% w/ 1:200,000 epinephrine at 01:13 CDT.         Test dose negative, 3 minutes after injection, for signs of intravascular, subdural, or intrathecal injection.       Insertion/Infusion Method: LORT air       Aspiration negative for Heme or CSF via Epidural Catheter.    Medication(s) Administered   0.125% bupivacaine (Epidural) - EPIDURAL   10 mL - 6/8/2022 1:18:00 AM  Medication Administration Time: 6/8/2022 1:05 AM

## 2022-06-08 NOTE — PLAN OF CARE
Data: Marielena Rodriguez transferred to 73 via wheelchair at 0810. Baby transferred via parent's arms.  Action: Receiving unit notified of transfer: Yes. Patient and family notified of room change. Report given to Thelma CORONA at 0811. Belongings sent to receiving unit. Accompanied by Registered Nurse. Oriented patient to surroundings. Call light within reach. ID bands double-checked with receiving RN.  Response: Patient tolerated transfer and is stable.

## 2022-06-08 NOTE — PLAN OF CARE
Problem: Plan of Care - These are the overarching goals to be used throughout the patient stay.    Goal: Optimal Comfort and Wellbeing  Intervention: Monitor Pain and Promote Comfort  Recent Flowsheet Documentation  Taken 6/8/2022 1011 by Barbie Coleman, RN  Pain Management Interventions: medication (see MAR)  Taken 6/8/2022 0827 by Barbie Coleman, RN  Pain Management Interventions: medication (see MAR)   Goal Outcome Evaluation:    Plan of Care Reviewed With: patient, spouse     Overall Patient Progress: no change  VSS. Pain managed with toradol x1 dose and tylenol. Pain medication offered and patient declines at this time, will notify RN. Voided x1.

## 2022-06-08 NOTE — PROGRESS NOTES
"    Anesthesia Post-Partum Follow-Up Note After  with Epidural    Patient: Marielena Rodriguez    Patient location: Post-partum floor.    Anesthesia type: Epidural    Subjective:     The patient reports no headache, does not complain of pruritis at this time. She denies weakness, paresthesia, or difficulties voiding.    Objective:    Respiratory Function (RR / SpO2 / Airway Patency): Satisfactory    Cardiac Function (HR / Rhythm / BP): Satisfactory    Strength and sensation lower extremities: Normal    Site of epidural insertion: No signs of infection or inflammation.     Last Vitals: BP 93/50   Temp 36.9  C (98.5  F) (Oral)   Resp 16   Ht 1.676 m (5' 6\")   Wt 85.7 kg (189 lb)   LMP 2021   SpO2 98%   BMI 30.51 kg/m      Assessment and plan:   Marielena Rodriguez is a 26 year old female  post-partum. A lumbar epidural catheter was successfully inserted for labor. This provided labor analgesia via PCEA pump infusing bupivacaine with fentanyl. The patient delivered and the epidural catheter was removed.      At this time, there is no evidence of adverse side effects associated with the insertion or removal of the epidural catheter.     Thank you for including us in the care for this patient.        Russell Sepulveda III, MD   Anesthesiology Resident    If questions or concerns, please contact OB anesthesia resident at [phone] or OB anesthesiologist at [phone] if resident unavailable.           "

## 2022-06-08 NOTE — PROGRESS NOTES
"S:  Marielena is resting in bed after epidural placement. Slowly feeling a little relief.     O:  /57   Temp 98.2  F (36.8  C) (Oral)   Resp 18   Ht 1.676 m (5' 6\")   Wt 85.7 kg (189 lb)   LMP 2021   SpO2 100%   BMI 30.51 kg/m    FHT: baseline: 120; variability: moderate with periods of minimal; accels: none; decels: None; ctx: q 2-5 min    Labor Course:  22  1500 SVE soft, very posterior, unable to reach cervical os  1520 vaginal miso x 1  1914 vaginal miso x 2  2000 PCN started per RN for GBS prophylaxis  2300 SVE /-3     A:   at 41w1d, here for IOL for postdates and oligohydramnios  Labor status: cervical ripening  GBS pos, adequately treated with PCN  Fetus Cat I with short periods of Cat II  COVID-19 neg    P:  Plan for SVE when patient is comfortable, consider Pitocin or AROM if needed for augmentation    Arely Orr, ELIZABETH MICHAELM      "

## 2022-06-08 NOTE — ANESTHESIA PREPROCEDURE EVALUATION
Anesthesia Pre-Procedure Evaluation    Patient: Marielena Rodriguez   MRN: [id number] : 1995        Procedure :           Past Medical History:   Diagnosis Date     Carrier of group B Streptococcus       Past Surgical History:   Procedure Laterality Date     NO HISTORY OF SURGERY  12/10/2018      No Known Allergies   Social History     Tobacco Use     Smoking status: Never Smoker     Smokeless tobacco: Never Used   Substance Use Topics     Alcohol use: No      Wt Readings from Last 1 Encounters:   22 85.7 kg (189 lb)        Anesthesia Evaluation            ROS/MED HX  ENT/Pulmonary:  - neg pulmonary ROS     Neurologic:  - neg neurologic ROS     Cardiovascular:  - neg cardiovascular ROS     METS/Exercise Tolerance:     Hematologic:  - neg hematologic  ROS     Musculoskeletal:       GI/Hepatic:  - neg GI/hepatic ROS     Renal/Genitourinary:       Endo:  - neg endo ROS     Psychiatric/Substance Use:  - neg psychiatric ROS     Infectious Disease:       Malignancy:       Other:            Physical Exam    Airway        Mallampati: II   TM distance: > 3 FB   Neck ROM: full   Mouth opening: > 3 cm    Respiratory Devices and Support         Dental  no notable dental history         Cardiovascular   cardiovascular exam normal          Pulmonary   pulmonary exam normal                OUTSIDE LABS:  CBC:   CBC RESULTS: Recent Labs   Lab Test 22  1512 22  1618 22  0937 21  1511 10/13/21  1238 20  0843 06/10/20  0330   WBC 10.2  --   --  11.9* 10.4  --  13.8*   HGB 11.0* 11.1* 11.7 12.8 14.9   < > 13.2   HCT 33.3*  --   --  37.0 44.5  --  38.5     --   --  353 410  --  220    < > = values in this interval not displayed.     Last Comprehensive Metabolic Panel:  Recent Labs   Lab Test 10/13/21  1238 19  1814 10/31/19  1359    134 138   POTASSIUM 3.1* 3.1* 3.3*   CHLORIDE 101 100 109   CO2 25 23 23   ANIONGAP 10 11 6   BUN 14 15 8   CR 0.62 0.51* 0.53   GFRESTIMATED >90 >90  >90   GLC 84 90 71   SNOW 9.1 10.5* 8.1*        Lab Results   Component Value Date    WBC 10.2 06/07/2022    WBC 11.9 (H) 11/12/2021    HGB 11.0 (L) 06/07/2022    HGB 11.1 (L) 05/05/2022    HCT 33.3 (L) 06/07/2022    HCT 37.0 11/12/2021     06/07/2022     11/12/2021     BMP:   Lab Results   Component Value Date     10/13/2021     11/18/2019    POTASSIUM 3.1 (L) 10/13/2021    POTASSIUM 3.1 (L) 11/18/2019    CHLORIDE 101 10/13/2021    CHLORIDE 100 11/18/2019    CO2 25 10/13/2021    CO2 23 11/18/2019    BUN 14 10/13/2021    BUN 15 11/18/2019    CR 0.62 10/13/2021    CR 0.51 (L) 11/18/2019    GLC 84 10/13/2021    GLC 90 11/18/2019     COAGS: No results found for: PTT, INR, FIBR  POC:   Lab Results   Component Value Date    HCG Negative 10/04/2018     HEPATIC: No results found for: ALBUMIN, PROTTOTAL, ALT, AST, GGT, ALKPHOS, BILITOTAL, BILIDIRECT, ROOPA  OTHER:   Lab Results   Component Value Date    SNOW 9.1 10/13/2021       Anesthesia Plan    ASA Status:  2      Anesthesia Type: Epidural.              Consents            Postoperative Care            Comments:    Other Comments: 41+1 IOL       neg OB ROS.       China Diallo MD

## 2022-06-08 NOTE — PROGRESS NOTES
"S:  Marielena is feeling contractions now and breathing with focus through them. She has been supported by RN, laboring on birth ball, and moving in the room. Interested in laboring in bathroom tub now.     O:  /62 (BP Location: Right arm, Patient Position: Sitting, Cuff Size: Adult Regular)   Temp 98.2  F (36.8  C) (Oral)   Resp 18   Ht 1.676 m (5' 6\")   Wt 85.7 kg (189 lb)   LMP 2021   BMI 30.51 kg/m    FHT: baseline: 125; variability: moderate; accels: yes; decels: None; ctx: q 2-6 min    Labor Course:  22  1500 SVE soft, very posterior, unable to reach cervical os  1520 vaginal miso x 1  1914 vaginal miso x 2  2000 PCN started per RN for GBS prophylaxis  2300 SVE 4/75/-3     A:   at 41w1d, here for IOL for postdates and oligohydramnios  Labor status: cervical ripening  GBS pos  Fetus Cat I  COVID-19 neg    P:  Discussed option to start Pitocin now or wait a few hours to see if contractions continue to increase in intensity; Marielena would like to wait 2 hours to see if contractions continue without additional medication  Labor support with hydrotherapy    Arely Orr, APRN CNM      "

## 2022-06-08 NOTE — PROVIDER NOTIFICATION
"   06/07/22 2326   Provider Notification   Provider Name/Title Aury MONTES   Method of Notification Electronic Page   Request Evaluate - Remote   Notification Reason Pain   Paged CNM, \"pt requesting epidural. IV bolus started, she is still in tub coping.\"  "

## 2022-06-08 NOTE — PROGRESS NOTES
"S:  Marielena is feeling more comfortable after epidural bolus but continues to have intermittent rectal pressure.    O:  /61   Temp 98.3  F (36.8  C) (Oral)   Resp 20   Ht 1.676 m (5' 6\")   Wt 85.7 kg (189 lb)   LMP 2021   SpO2 97%   BMI 30.51 kg/m    FHT: baseline: 125; variability: moderate; accels: none; decels: Early decelerations; ctx: q 2-4 min    Labor Course:  22  1500 SVE soft, very posterior, unable to reach cervical os  1520 vaginal miso x 1  1914 vaginal miso x 2  2000 PCN started per RN for GBS prophylaxis  2300 SVE 4/75/-3       0150 SVE 7/90/-2    A:   at 41w1d, here for IOL for postdates and oligohydramnios  Labor status: cervical ripening  GBS pos, adequately treated with PCN  Fetus Cat I  COVID-19 neg    P:  Rest with epidural and consider SVE when rectal pressure is more constant.    Arely Orr, ELIZABETH CNM      "

## 2022-06-08 NOTE — PLAN OF CARE
Delay in publishing note d/t direct patient care.    Goal Outcome Evaluation: Vaginal Delivery Note   of viable Male with CNM, RN, and FOB in attendance. Nursery RN, Lis, present.  Infant with spontaneous cry, to mother's abdomen, dried and stimulated.  See delivery summary for APGARs.  Placenta delivered with out complication, pitocin infusing per order, first degree laceration with repair, belkis cares provided.  Mother and baby in stable condition.

## 2022-06-08 NOTE — PROVIDER NOTIFICATION
06/08/22 0045   Provider Notification   Provider Name/Title [place] [facility]   Method of Notification At Bedside   Request Evaluate in Person   Notification Reason Pain   MDA at bedside for consents.

## 2022-06-08 NOTE — L&D DELIVERY NOTE
OB Vaginal Delivery Note    Marielena Rodriguez MRN# [id number]   Age: 26 year old YOB: 1995     Labor Story:  Marielena arrived at hospital on 6/7 in the afternoon for planned IOL due to post dates pregnancy and oligohydramnios. She received two doses of vaginal miso at 1520 and 1914 and then began laboring spontaneously. Fetal heart tones monitored with CEFM and were overwhelmingly Cat I throughout labor. She labored on birth ball and in bathtub in bathroom. After laboring with her own inner resources for several hours, she requested epidural for pain support. Epidural was delayed due to other patient care in [facility], and she received one dose of IV fentanyl while waiting for epidural. When epidural was placed she continued to feel some pelvic pressure and rectal pain. Cervix was checked at 0149 on 6/8 and she was 7/90/-2. She desired to rest and rested for a couple hours. CNM returned to room and she was found to be complete and +2 station at 0509. She started pushing at 0519 with excellent effort. She pushed on her back and in left side lying position. At 0540 baby began to crown. Head was delivered between contractions in SWATHI position and anterior shoulder delivered with ease with next push immediately after. Baby opened eyes and began to cry as body was delivered. Terminal meconium noted. Baby placed on Marielena's abdomen. IV Pitocin started. Baby had good tone and cry. After several minutes, cord was doubly clamped and cut by GABRIELLA Rios. After a few minutes, Marielena requested that baby be wrapped and given to Blanca to hold. Cord blood collected. Placenta delivered in Jackson position with gentle cord traction. Fundus immediately firm. Genital inspection showed first degree tear repaired with 3-0 Vicryl. Bleeding was stable. Baby looking around in father's arms and rooting. Baby was returned to Marielena's chest and breastfeeding initiated. Family is bonding together. It was a pleasure to attend  this beautiful birth.      GA: 41w2d  GP:   Labor Complications: None   EBL:   mL  Delivery QBL:    Delivery Type: Vaginal, Spontaneous   ROM to Delivery Time: (Delivered) Hours: 4 Minutes: 43  [place]ison Weight: 3.33 kg (7 lb 5.5 oz)    1 Minute 5 Minute 10 Minute   Apgar Totals: 8   9        JACE ELIZABETH;ADI PAYTON;MAHESH CASTELAN     Delivery Details:  Marielena Rodriguez, a 26 year old  female delivered a viable infant with apgars of 8  and 9 . Patient was fully dilated and pushing after   hours   minutes in active labor. Delivery was via vaginal, spontaneous  to a sterile field under epidural  anesthesia. Infant delivered in vertex  left  occiput  anterior  position. Anterior and posterior shoulders delivered without difficulty. The cord was clamped, cut twice and   were noted. Cord blood was obtained in routine fashion with the following disposition: lab .      Cord complications: none   Placenta delivered at 2022  5:51 AM . Placental disposition was Hospital disposal . Fundal massage performed and fundus found to be firm.     Episiotomy: none    Perineum, vagina, cervix were inspected, and the following lacerations were noted:   Perineal lacerations: 1st                Any lacerations were repaired in the usual fashion using 3-0 Vicryl.    Excellent hemostasis was noted. Needle count correct. Infant and patient in delivery room in good and stable condition.        Herb Rodriguez-Marielena [[id number]]    Labor Event Times    Labor onset date: 22 Onset time:  1:30 AM   Dilation complete date: 22 Complete time:  5:09 AM   Start pushing date/time: 2022 0519      Labor Events     labor?: No   steroids: None  Labor Type: Induction/Cervical ripening  Predominate monitoring during 1st stage: continuous electronic fetal monitoring     Antibiotics received during labor?: Yes  Reason for Antibiotics: GBS  Antibiotics received for GBS:  Penicillin  Antibiotics Given (GBS): Less than or equal to 4 hours prior to delivery     Rupture date/time: 22 0100   Rupture type: Spontaneous rupture of membranes occuring during spontaneous labor or augmentation  Fluid color: Clear     Induction: Misoprostol  Induction date/time: 22 1414   Cervical ripening date/time: 22 1520   Indications for induction: Post-term Gestation     Augmentation: None  1:1 continuous labor support provided by?: RN, provider       Delivery/Placenta Date and Time    Delivery Date: 22 Delivery Time:  5:43 AM   Placenta Date/Time: 2022  5:51 AM  Oxytocin given at the time of delivery: after delivery of baby  Delivering clinician: Arely Chavez APRN CNM   Other personnel present at delivery:  Provider Role   Ludmila Javier RN Delivery Nurse   Lis Payton RN Delivery Assist   Arely Chavez APRN CNM Certified Nurse Midwife         Vaginal Counts     Initial count performed by 2 team members:  Two Team Members   luma chavez cnm       Needles Suture Needles Sponges (RETIRED) Instruments   Initial counts 2 0 5    Added to count 0 1 0    Relief counts       Final counts 2 1 5          Placed during labor Accounted for at the end of labor   FSE No NA   IUPC No NA   Cervidil No NA              Final count performed by 2 team members:  Two Team Members   luma chavze cnm      Final count correct?: Yes     Apgars    Living status: Living   1 Minute 5 Minute 10 Minute 15 Minute 20 Minute   Skin color: 1  1       Heart rate: 2  2       Reflex irritability: 2  2       Muscle tone: 1  2       Respiratory effort: 2  2       Total: 8  9       Apgars assigned by: BRITNI PAYTON RN     Cord    Cord Complications: None               Cord Blood Disposition: Lab    Gases Sent?: No    Delayed cord clamping?: Yes    Cord Clamping Delay (seconds): >120 seconds    Stem cell collection?: No        Resuscitation     Methods: None  Flushing Care at Delivery:  infant male with spontaneous cry, placed on mother's abdomen. Dried and stimulated with more vigorous cry. Bulb suctioned from nose and mouth with thick, clear secretions. Delayed cord clamping x4 minutes. Brought to mother's chest for skin to skin.   Output in Delivery Room: Stool      Measurements    Weight: 7 lb 5.5 oz    Output in delivery room: Stool     Skin to Skin and Feeding Plan    Skin to skin initiation date/time: 1841    Skin to skin with: Mother  Skin to skin end date/time: 1841       Labor Events and Shoulder Dystocia    Fetal Tracing Prior to Delivery: Category 1, Category 2  Shoulder dystocia present?: Neg     Delivery (Maternal) (Provider to Complete) ([id number])    Episiotomy: None  Perineal lacerations: 1st Repaired?: Yes   Repair suture: 3-0 Vicryl  Genital tract inspection done: Pos     Blood Loss  Mother: Marielena Rodriguez #[id number]   Start of Mother's Information    Delivery Blood Loss  22 0130 - 22 0624    None           End of Mother's Information  Mother: Marielena Rodriguez #[id number]          Delivery - Provider to Complete ([id number])    Delivering clinician: Arely Orr APRN CNM  CNM Care: Exclusive CNM care in labor  Attempted Delivery Types (Choose all that apply): Spontaneous Vaginal Delivery  Delivery Type (Choose the 1 that will go to the Birth History): Vaginal, Spontaneous                   Other personnel:  Provider Role   Ludmila Javier, RN Delivery Nurse   Lis Eli RN Delivery Assist   Arely Orr APRN CNM Certified Nurse Midwife                Placenta    Date/Time: 2022  5:51 AM  Removal: Spontaneous  Disposition: Hospital disposal           Anesthesia    Method: Epidural  Cervical dilation at placement: 4-7                Presentation and Position    Presentation: Vertex    Position: Left Occiput Anterior                 Aury Gallo,  APRN CNM

## 2022-06-08 NOTE — PLAN OF CARE
Goal Outcome Evaluation: Criteria met for transfer to [facility]  Patient arrived to [facility] unit via wheelchair at 0811,with belongings, accompanied by spouse/ significant other, with infant in arms. Received report from CHLOE Salguero and checked bands. Unit and room orientation started. Call light given; no concerns present at this time. Continue with plan of care.

## 2022-06-08 NOTE — PROGRESS NOTES
"S:  Marielena is feeling some cramping is low pelvis. She has moved around during the day and is also trying to get some rest in case things  overnight. Her  Augusta is at bedside and engaged in care.    O:  /68 (BP Location: Right arm, Patient Position: Semi-Hillman's, Cuff Size: Adult Regular)   Temp 98  F (36.7  C) (Oral)   Resp 16   Ht 1.676 m (5' 6\")   Wt 85.7 kg (189 lb)   LMP 2021   BMI 30.51 kg/m    FHT: baseline: 135; variability: moderate; accels: yes; decels: None; ctx: irregular, q 7 min    Labor Course:  22  1500 SVE soft, very posterior, unable to reach cervical os  1520 vaginal miso x 1  1914 vaginal miso x 2      A:   at 41w1d, here for IOL for postdates and oligohydramnios  Labor status: cervical ripening  GBS pos  Fetus Cat I  COVID-19 neg    P:  Continue cervical ripening  Next dose of miso due in 4 hours    Arely Orr, ELIZABETH MICHAELM      "

## 2022-06-09 VITALS
DIASTOLIC BLOOD PRESSURE: 65 MMHG | SYSTOLIC BLOOD PRESSURE: 93 MMHG | BODY MASS INDEX: 30.37 KG/M2 | RESPIRATION RATE: 16 BRPM | HEART RATE: 64 BPM | WEIGHT: 189 LBS | OXYGEN SATURATION: 99 % | HEIGHT: 66 IN | TEMPERATURE: 98.1 F

## 2022-06-09 LAB — HGB BLD-MCNC: 9.2 G/DL (ref 11.7–15.7)

## 2022-06-09 PROCEDURE — 85018 HEMOGLOBIN: CPT | Performed by: ADVANCED PRACTICE MIDWIFE

## 2022-06-09 PROCEDURE — 250N000013 HC RX MED GY IP 250 OP 250 PS 637: Performed by: ADVANCED PRACTICE MIDWIFE

## 2022-06-09 PROCEDURE — 36415 COLL VENOUS BLD VENIPUNCTURE: CPT | Performed by: ADVANCED PRACTICE MIDWIFE

## 2022-06-09 RX ORDER — AMOXICILLIN 250 MG
1 CAPSULE ORAL DAILY
Qty: 100 TABLET | Refills: 0 | Status: SHIPPED | OUTPATIENT
Start: 2022-06-09 | End: 2024-03-01

## 2022-06-09 RX ORDER — LANOLIN ALCOHOL/MO/W.PET/CERES
1000 CREAM (GRAM) TOPICAL DAILY
Qty: 42 TABLET | Refills: 0 | Status: SHIPPED | OUTPATIENT
Start: 2022-06-09 | End: 2022-07-21

## 2022-06-09 RX ORDER — ACETAMINOPHEN 325 MG/1
650 TABLET ORAL EVERY 6 HOURS PRN
Qty: 100 TABLET | Refills: 0 | Status: SHIPPED | OUTPATIENT
Start: 2022-06-09 | End: 2024-03-01

## 2022-06-09 RX ORDER — MULTIVIT WITH MINERALS/LUTEIN
250 TABLET ORAL DAILY
Qty: 42 TABLET | Refills: 0 | Status: SHIPPED | OUTPATIENT
Start: 2022-06-09 | End: 2022-07-21

## 2022-06-09 RX ORDER — IBUPROFEN 600 MG/1
600 TABLET, FILM COATED ORAL EVERY 6 HOURS PRN
Qty: 60 TABLET | Refills: 0 | Status: SHIPPED | OUTPATIENT
Start: 2022-06-09 | End: 2024-03-01

## 2022-06-09 RX ORDER — FERROUS SULFATE 325(65) MG
325 TABLET ORAL EVERY OTHER DAY
Qty: 21 TABLET | Refills: 0 | Status: SHIPPED | OUTPATIENT
Start: 2022-06-09 | End: 2022-07-21

## 2022-06-09 RX ADMIN — DOCUSATE SODIUM 100 MG: 100 CAPSULE, LIQUID FILLED ORAL at 11:45

## 2022-06-09 RX ADMIN — IBUPROFEN 800 MG: 800 TABLET, FILM COATED ORAL at 11:45

## 2022-06-09 ASSESSMENT — ACTIVITIES OF DAILY LIVING (ADL)
ADLS_ACUITY_SCORE: 20

## 2022-06-09 NOTE — PLAN OF CARE
Data: Vital signs within normal limits. Postpartum checks within normal limits - see flow record. Patient eating and drinking normally. Patient able to empty bladder independently and is up ambulating. Patient performing self cares and is able to care for infant.  Action: Patient medicated during the shift for pain and cramping. See MAR. Patient reassessed within 1 hour after each medication and pain was improved - patient stated she was comfortable. Patient education done about 24 hour screenings and pain regulation. See flow record.  Response: Positive attachment behaviors observed with infant. Support persons are present.   Plan: Anticipate discharge and continue with plan of care.

## 2022-06-09 NOTE — DISCHARGE INSTRUCTIONS
After a Vaginal Birth  After having a baby, your body may be very tired. It can take time to recover from a vaginal delivery. You may stay in the hospital or birth center from 1 to 4 days. In some cases, you may be able to go home the same day.     Right after the delivery  Your temperature and blood pressure will be taken until they are stable. A nurse or other healthcare provider will watch you as you rest. You may have afterbirth pains. These are cramps caused by the uterus shrinking. Sanitary pads are used to soak up the discharge of the uterine lining. To make sure that you aren t bleeding too much, the pad will be checked. And the firmness of your uterus will be checked. To do this, a nurse will gently push down on your stomach. If you had anesthesia, you ll be watched closely until you can feel and move your toes. If you have pain between your vagina and anus (perineal pain), an ice pack can help.    care  While still in the hospital or birth center, you ll learn how to hold and feed your baby. You will also be given instructions on how to care for your baby. This includes bathing and feeding.    Getting ready to go home  You may be anxious to go home as soon as possible. Before you and your baby go home, a healthcare provider will check to be sure you are healthy enough to take care of your baby and yourself. You re ready to go home when:   You can walk to the bathroom and use the bathroom without help.  You can eat solid food and swallow pills (if needed).  You have no sign of infection or other health problems, including fever.   You have adequate pain control.  You aren't bleeding isn't excessive.  You are able to care for your  and are emotionally stable.  Before going home, you ll be given written instructions for home self-care after vaginal delivery. Follow these instructions carefully. If you have questions or concerns, talk about them now.   If you have stitches  You may have  received stitches in the skin near your vagina. The stitches might have closed an incision that enlarged the opening of your vagina (episiotomy). Or you may have needed stitches to repair torn skin. Either way, your stitches should dissolve in weeks. Until then, it's important to keep the stitches clean. You can help reduce mild pain, aid healing, and reduce your risk of infection. These tips can help:   Gently wipe from front to back after you urinate or have a bowel movement.  After wiping, spray warm water on the area. Or you can have a sitz bath. This means sitting in a tub with a few inches of water in it. Then pat the area dry or use a hairdryer on a cool setting.  Don't use soap or any solution except water on the area.  You can take a shower unless told not to.  Change sanitary pads at least every 2 to 4 hours.  Place cold or heat packs on the area as directed by your healthcare providers or nurses. Keep a thin towel between the pack and your skin.  Sit on firm seats so the stitches pull less.   follow-up  Schedule a  follow-up exam with your healthcare provider for about 6 weeks after delivery. During this exam, your uterus and vaginal area will be checked. Contact your healthcare provider if you think you or your baby are having any problems.   When to call your healthcare provider  Call your healthcare provider right away if you have:   A fever of 100.4  F ( 38.0 C) or higher, or as directed by your provider  Bleeding that needs a new sanitary pad after an hour, or large blood clots  Pain in your vagina that gets worse and isn't eased with medicine  Swelling, discharge, or more pain from vaginal tear or episiotomy  Burning, pain, red streaks, or lumpy areas in your breasts that may occur with flu-like symptoms  Cracks, blisters, or blood on your nipples  Burning or pain when you urinate  Nausea or vomiting  Dizziness or fainting  Feelings of extreme sadness or anxiety, or a feeling that  you don t want to be with your baby  Belly pain that isn t eased with medicine  Vaginal discharge that has a bad odor  No bowel movement for 5 days  Painful urination, or inability to control urination  Redness, warmth, or pain in the lower leg  Chest pain  Tracee last reviewed this educational content on 8/1/2020 2000-2021 [employer]. All rights reserved. This information is not intended as a substitute for professional medical care. Always follow your healthcare professional's instructions.

## 2022-06-09 NOTE — PLAN OF CARE
Goal Outcome Evaluation:      VSS. Postpartum assessment WNL. C/o some uterine cramping/discomfort. Well managed with ibuprofen. Using ice and tucks for perineal pain. Pt discharged with bath salts for home. Breastfeeding and formula feeding independently. Birth certificate and ROP completed and turned. Breast pump given and explained to pt. Reviewed AVS and discharge medications. Pt to discharge to home with spouse and baby.

## 2022-06-09 NOTE — PLAN OF CARE
Goal Outcome Evaluation:    Plan of Care Reviewed With: patient, spouse           Data: VSS, postpartum assessments WNL. She is voiding without difficulty, up ad juan carlos, passing gas, eating and drinking normally. Perineum appears to be healing well, lochia WNL, no s/s infection. Breastfeeding infant independently. Taking ibuprofen for pain and using belkis ice packs, witch hazel and belkis bottle. Reports good pain management.   Action: Education provided on plan of care  Response: Pt is agreeable with her plan of care. Positive attachment behaviors observed with infant. Support person present. Anticipate discharge per care plan.

## 2022-06-09 NOTE — DISCHARGE SUMMARY
"Post Partum Note    Marielena is feeling well overall. She is happy with labor and birth experience. Feeling a little lightheaded. Discussed anemia and plan for iron supplementation. She understands. Some cramping that is relieved by Tylenol and ibuprofen but sometimes breaks through especially with breastfeeding. Breastfeeding is going well but she worries she doesn't have enough milk and has been giving baby a little formula as well. Discussed colostrum and that baby does not need much volume now, but that milk will come in around day 3. Discussed options for birth control and she desires Nexplanon.     SIGNIFICANT PROBLEMS:  Patient Active Problem List    Diagnosis Date Noted     Anemia due to blood loss, acute 06/24/2022     Priority: Medium     Encounter for induction of labor 06/07/2022     Priority: Medium     Encounter for triage in pregnant patient 04/25/2022     Priority: Medium     Need for Tdap vaccination 10/05/2021     Priority: Medium     Group B Streptococcus urinary tract infection affecting pregnancy in third trimester 06/10/2020     Priority: Medium     Adequately treated in labor with PCN.       Nausea and vomiting in pregnancy prior to 22 weeks gestation 11/12/2019     Priority: Medium     Bilateral thoracic back pain 01/25/2019     Priority: Medium       INTERVAL HISTORY:  BP 93/65   Pulse 64   Temp 98.1  F (36.7  C) (Oral)   Resp 16   Ht 1.676 m (5' 6\")   Wt 85.7 kg (189 lb)   LMP 08/23/2021   SpO2 99%   Breastfeeding Unknown   BMI 30.51 kg/m    Pt stable, baby is rooming in  Breast feeding status:initiated  Complications since 2 hours post delivery: None  Patient is tolerating acitivity well Voiding without difficulty, cramping is relieved by Ibuprophen, lochia is decreasing and patient denies clots.  Perineal pain is is relieved by Ibuprophen.  The perineum laceration is well approximated    Postpartum hemoglobin   Hemoglobin   Date Value Ref Range Status   06/09/2022 9.2 (L) 11.7 - " 15.7 g/dL Final   2020 11.6 (L) 11.7 - 15.7 g/dL Final     Blood type   Lab Results   Component Value Date    ABO O 2020       Lab Results   Component Value Date    RH Pos 2020     Rubella status immune  History of depression: none    ASSESSMENT/PLAN:  Normal postpartum exam   Stable Post-partum day #1  Complications:anemic, plan for iron supplementation  Plan d/c home today  RTC 6 weeks  Teaching done: D/C Instructions: Nutrition/Activity, Engorgement Management, Birth Control Options, Warning Signs/When to Call: Excessive Bleeding, Infection, PP Depression, Kegals and Crunches, RTC Clinic for PP Appointment, PNV and Iron supplemenation  Birthcontrol planned: Nexplanon  Current Discharge Medication List      START taking these medications    Details   acetaminophen (TYLENOL) 325 MG tablet Take 2 tablets (650 mg) by mouth every 6 hours as needed for mild pain Start after Delivery.  Qty: 100 tablet, Refills: 0    Associated Diagnoses:  (normal spontaneous vaginal delivery)      cyanocobalamin (VITAMIN B-12) 1000 MCG tablet Take 1 tablet (1,000 mcg) by mouth daily for 42 days  Qty: 42 tablet, Refills: 0    Associated Diagnoses: Anemia due to blood loss, acute      ferrous sulfate (FEROSUL) 325 (65 Fe) MG tablet Take 1 tablet (325 mg) by mouth every other day for 42 days  Qty: 21 tablet, Refills: 0    Associated Diagnoses: Anemia due to blood loss, acute      ibuprofen (ADVIL/MOTRIN) 600 MG tablet Take 1 tablet (600 mg) by mouth every 6 hours as needed for moderate pain Start after delivery  Qty: 60 tablet, Refills: 0    Associated Diagnoses:  (normal spontaneous vaginal delivery)      senna-docusate (SENOKOT-S/PERICOLACE) 8.6-50 MG tablet Take 1 tablet by mouth daily Start after delivery.  Qty: 100 tablet, Refills: 0    Associated Diagnoses:  (normal spontaneous vaginal delivery)      vitamin C (ASCORBIC ACID) 250 MG tablet Take 1 tablet (250 mg) by mouth daily for 42 days  Qty: 42  tablet, Refills: 0    Associated Diagnoses: Anemia due to blood loss, acute         CONTINUE these medications which have NOT CHANGED    Details   Prenatal Vit-Fe Fumarate-FA (PRENATAL MULTIVITAMIN W/IRON) 27-0.8 MG tablet Take 1 tablet by mouth daily           Aury Gallo APRN CNM [place] M. Neumeister, Gwen, APRN CNM,  [facility]

## 2022-06-24 PROBLEM — D62 ANEMIA DUE TO BLOOD LOSS, ACUTE: Status: ACTIVE | Noted: 2022-06-24

## 2022-08-08 ENCOUNTER — NURSE TRIAGE (OUTPATIENT)
Dept: NURSING | Facility: CLINIC | Age: 27
End: 2022-08-08

## 2022-08-08 NOTE — TELEPHONE ENCOUNTER
"Patient calling, and states she had a positive COVID test (antigen).  She is wanting to schedule a PCR test to confirm positive test.    Caller sent to make PCR COVID test.    Rosa Maria Bowen RN   [facility] Nurse Advisor    COVID 19 Nurse Triage Plan/Patient Instructions    Please be aware that novel coronavirus (COVID-19) may be circulating in the community. If you develop symptoms such as fever, cough, or SOB or if you have concerns about the presence of another infection including coronavirus (COVID-19), please contact your health care provider or visit [email or web address].     Disposition/Instructions    Home care recommended. Follow home care protocol based instructions.  In-Person Visit with provider recommended. Reference Visit Selection Guide.  Additional COVID19 information to add for patients.   How can I protect others?  If you have symptoms (fever, cough, body aches or trouble breathing): Stay home and away from others (self-isolate) until:    At least 10 days have passed since your symptoms started, And     You ve had no fever--and no medicine that reduces fever--for 1 full day (24 hours), And      Your other symptoms have resolved (gotten better).     If you don t have symptoms, but a test showed that you have COVID-19 (you tested positive):    Stay home and away from others (self-isolate). Follow the tips under \"How do I self-isolate?\" below for 10 days (20 days if you have a weak immune system).    You don't need to be retested for COVID-19 before going back to school or work. As long as you're fever-free and feeling better, you can go back to school, work and other activities after waiting the 10 or 20 days.     How do I self-isolate?    Stay in your own room, even for meals. Use your own bathroom if you can.     Stay away from others in your home. No hugging, kissing or shaking hands. No visitors.    Don t go to work, school or anywhere else.     Clean  high touch  surfaces often " (doorknobs, counters, handles, etc.). Use a household cleaning spray or wipes. You ll find a full list on the EPA website:  www.epa.gov/pesticide-registration/list-n-disinfectants-use-against-sars-cov-2.    Cover your mouth and nose with a mask, tissue or washcloth to avoid spreading germs.    Wash your hands and face often. Use soap and water.    Caregivers in these groups are at risk for severe illness due to COVID-19:  o People 65 years and older  o People who live in a nursing home or long-term care facility  o People with chronic disease (lung, heart, cancer, diabetes, kidney, liver, immunologic)  o People who have a weakened immune system, including those who:  - Are in cancer treatment  - Take medicine that weakens the immune system, such as corticosteroids  - Had a bone marrow or organ transplant  - Have an immune deficiency  - Have poorly controlled HIV or AIDS  - Are obese (body mass index of 40 or higher)  - Smoke regularly    Caregivers should wear gloves while washing dishes, handling laundry and cleaning bedrooms and bathrooms.    Use caution when washing and drying laundry: Don t shake dirty laundry, and use the warmest water setting that you can.    For more tips, go to www.cdc.gov/coronavirus/2019-ncov/downloads/10Things.pdf.    How can I take care of myself?  1. Get lots of rest. Drink extra fluids (unless a doctor has told you not to).     2. Take Tylenol (acetaminophen) for fever or pain. If you have liver or kidney problems, ask your family doctor if it s okay to take Tylenol.     Adults can take either:     650 mg (two 325 mg pills) every 4 to 6 hours, or     1,000 mg (two 500 mg pills) every 8 hours as needed.     Note: Don t take more than 3,000 mg in one day.   Acetaminophen is found in many medicines (both prescribed and over-the-counter medicines). Read all labels to be sure you don t take too much.     For children, check the Tylenol bottle for the right dose. The dose is based on the  child s age or weight.    3. If you have other health problems (like cancer, heart failure, an organ transplant or severe kidney disease): Call your specialty clinic if you don t feel better in the next 2 days.    4. Know when to call 911: Emergency warning signs include:    Trouble breathing or shortness of breath    Pain or pressure in the chest that doesn t go away    Feeling confused like you haven t felt before, or not being able to wake up    Bluish-colored lips or face    What are the symptoms of COVID-19?     The most common symptoms are cough, fever and trouble breathing.     Less common symptoms include body aches, chills, diarrhea (loose, watery poops), fatigue (feeling very tired), headache, runny nose, sore throat and loss of smell.    COVID-19 can cause severe coughing (bronchitis) and lung infection (pneumonia).    How does it spread?     The virus may spread when a person coughs or sneezes into the air. The virus can travel about 6 feet this way, and it can live on surfaces.      Common  (household disinfectants) will kill the virus.    Who is at risk?  Anyone can catch COVID-19 if they re around someone who has the virus.    How can others protect themselves?     Stay away from people who have COVID-19 (or symptoms of COVID-19).    Wash hands often with soap and water. Or, use hand  with at least 60% alcohol.    Avoid touching the eyes, nose or mouth.     Wear a face mask when you go out in public, when sick or when caring for a sick person.    Where can I get more information?    [facility]: About COVID-19: [email or web address]    CDC: What to Do If You re Sick: www.cdc.gov/coronavirus/2019-ncov/about/steps-when-sick.html    CDC: Ending Home Isolation: www.cdc.gov/coronavirus/2019-ncov/hcp/disposition-in-home-patients.html     CDC: Caring for Someone: www.cdc.gov/coronavirus/2019-ncov/if-you-are-sick/care-for-someone.html     [facility]: Interim Guidance for [facility]  Discharge to Home: www.[facility]./diseases/coronavirus/hcp/hospdischarge.pdf    [facility] clinical trials (COVID-19 research studies): clinicalaffairs.[facility]/[employer]-clinical-trials     Below are the COVID-19 hotlines at the [place] Department of Health ([facility]). Interpreters are available.   o For health questions: Call 798-092-2495 or 1-140.652.2175 (7 a.m. to 7 p.m.)  o For questions about schools and childcare: Call 954-242-6218 or 1-750.305.6452 (7 a.m. to 7 p.m.)          Thank you for taking steps to prevent the spread of this virus.  o Limit your contact with others.  o Wear a simple mask to cover your cough.  o Wash your hands well and often.    Resources    [facility]: About COVID-19: [email or web address]    CDC: What to Do If You're Sick: www.cdc.gov/coronavirus/2019-ncov/about/steps-when-sick.html    CDC: Ending Home Isolation: www.cdc.gov/coronavirus/2019-ncov/hcp/disposition-in-home-patients.html     CDC: Caring for Someone: www.cdc.gov/coronavirus/2019-ncov/if-you-are-sick/care-for-someone.html     [facility]: Interim Guidance for Hospital Discharge to Home: www.[facility]./diseases/coronavirus/hcp/hospdischarge.pdf    [facility] clinical trials (COVID-19 research studies): clinicalaffairs.[facility]/[employer]-clinical-trials     Below are the COVID-19 hotlines at the [place] Department [facility] ([facility]). Interpreters are available.   o For health questions: Call 742-957-3167 or 1-467.425.1742 (7 a.m. to 7 p.m.)  o For questions about schools and childcare: Call 433-776-1786 or 1-245.863.1457 (7 a.m. to 7 p.m.)                   Reason for Disposition    Information only question and nurse able to answer    Protocols used: INFORMATION ONLY CALL - NO TRIAGE-A-[place]

## 2022-08-18 ENCOUNTER — MEDICAL CORRESPONDENCE (OUTPATIENT)
Dept: HEALTH INFORMATION MANAGEMENT | Facility: CLINIC | Age: 27
End: 2022-08-18

## 2022-09-18 ENCOUNTER — HEALTH MAINTENANCE LETTER (OUTPATIENT)
Age: 27
End: 2022-09-18

## 2022-09-22 ENCOUNTER — OFFICE VISIT (OUTPATIENT)
Dept: MIDWIFE SERVICES | Facility: CLINIC | Age: 27
End: 2022-09-22
Payer: MEDICAID

## 2022-09-22 VITALS
WEIGHT: 175.2 LBS | HEART RATE: 74 BPM | OXYGEN SATURATION: 97 % | BODY MASS INDEX: 28.16 KG/M2 | HEIGHT: 66 IN | DIASTOLIC BLOOD PRESSURE: 74 MMHG | SYSTOLIC BLOOD PRESSURE: 114 MMHG

## 2022-09-22 DIAGNOSIS — Z30.017 INSERTION OF IMPLANTABLE SUBDERMAL CONTRACEPTIVE: ICD-10-CM

## 2022-09-22 DIAGNOSIS — Z12.4 SCREENING FOR MALIGNANT NEOPLASM OF CERVIX: ICD-10-CM

## 2022-09-22 LAB — HCG UR QL: NEGATIVE

## 2022-09-22 PROCEDURE — 11981 INSERTION DRUG DLVR IMPLANT: CPT | Performed by: ADVANCED PRACTICE MIDWIFE

## 2022-09-22 PROCEDURE — G0145 SCR C/V CYTO,THINLAYER,RESCR: HCPCS | Performed by: ADVANCED PRACTICE MIDWIFE

## 2022-09-22 PROCEDURE — 81025 URINE PREGNANCY TEST: CPT | Performed by: ADVANCED PRACTICE MIDWIFE

## 2022-09-22 PROCEDURE — 99207 PR POST PARTUM EXAM: CPT | Performed by: ADVANCED PRACTICE MIDWIFE

## 2022-09-22 NOTE — PROGRESS NOTES
"SUBJECTIVE:   Marielena Rodriguez is here for her 6-week postpartum checkup.     HPI: Marielena had a great labor and birth experience, was not as happy with her postpartum nursing care. But, she is feeling well and has not had any complications postpartum. She is still breastfeeding, but has also been supplementing with formula, so supply is decreasing. She has had intercourse, but has not gotten a menses yet.     DELIVERY DATE: 24   of a viable boy, weight 7 pounds 5.5 oz., with no complications.  FEEDING METHOD: and supplementing with formula    CONTRACEPTION PLANNED: Nexplanon  She  has had intercourse since delivery and complains of No discomfort.  HX OF DEPRESSION:No  HX OF ABUSE:No  OTHER HPI: She has no signs of infection, bleeding or other complications. Bleeding stopped, epis/lac healing well.         EXAM:  /74   Pulse 74   Ht 1.676 m (5' 6\")   Wt 79.5 kg (175 lb 3.2 oz)   SpO2 97%   Breastfeeding Yes   BMI 28.28 kg/m    GENERAL APPEARANCE: healthy, alert and no distress  NECK: thyroid normal to palpation  RESP: lungs clear to auscultation - no rales, rhonchi or wheezes  CV: regular rates and rhythm, normal S1 S2, no S3 or S4 and no murmur, click or rub -  ABDOMEN: soft, nontender, diastasis recti closing  NEURO: Normal strength and tone, sensory exam grossly normal, mentation intact and speech normal  PSYCH: mentation appears normal and affect normal/bright  PELVIC EXAM:  Vulva: BUS WNL, no lesions noted  Vagina: Discharge normal and physiologic, no lesions, well rugated, good tone    ASSESSMENT:   Normal postpartum exam after .    PLAN:  Birth Control as ordered. Fertility reviewed.  Nexplanon inserted today (see following note).  Return as needed or at time interval for next routine pap, pelvic, or breast exam.  Encourage Kegals and abdominal exercise. Slow, steady weight loss.  Continue a multi vitamin supplement, especially if breastfeeding.  Pap smear was " "obtained.  GC/CHLAMYDIA CULTURE OBTAINED: no  Post partum Hgb was not obtained.        Nexplanon Insertion:    Is a pregnancy test required: Yes.  Was it positive or negative?  Negative  Was a consent obtained?  Yes    Subjective: Marielena Rodriguez is a 27 year old  presents for Nexplanon.    Patient has been given the opportunity to ask questions about all forms of birth control, including all options appropriate for Marielena Rodriguez. Discussed that no method of birth control, except abstinence is 100% effective against pregnancy or sexually transmitted infection.     Marielena Rodriguez understands she may have the Nexplanon removed at any time and it should be removed by a health care provider.    The entire insertion procedure was reviewed with the patient, including care after placement.    No LMP recorded. (Menstrual status: Postpartum). Last sexual activity: 3 weeks ago. No allergy to betadine or shellfish. Patient declines STD screening  HCG Qual Urine   Date Value Ref Range Status   10/04/2018 Negative NEG^Negative Final     Comment:     This test is for screening purposes.  Results should be interpreted along with   the clinical picture.  Confirmation testing is available if warranted by   ordering [id number], HCG Quantitative Pregnancy.       hCG Urine Qualitative   Date Value Ref Range Status   2022 Negative Negative Final     Comment:     This test is for screening purposes.  Results should be interpreted along with the clinical picture.  Confirmation testing is available if warranted by ordering [id number], HCG Quantitative Pregnancy.         /74   Pulse 74   Ht 1.676 m (5' 6\")   Wt 79.5 kg (175 lb 3.2 oz)   SpO2 97%   Breastfeeding Yes   BMI 28.28 kg/m      PROCEDURE NOTE: -- Nexplanon Insertion    Reason for Insertion: contraception    Patient was placed supine with left arm exposed.  Luz was made 8-10 cm above medial epicondyle and a guiding luz 4 cm above the first.  Arm was prepped " with Betadine. Insertion point was anesthetized with 3 mL 1% lidocaine. After stretching the skin with thumb and index finger around the insertion site, skin punctured with the tip of the needle inserted at 30 degrees and then lowered to horizontal position. The needle was then advanced to its full length. Applicator was then stabilized and slider was unlocked. Slider was pulled back until it stopped and then removed.    Correct placement of the implant was confirmed by palpation in the patient's arm and visualizing the purple top of the obturator.   Bandage and pressure dressing applied to insertion site.    Lot # [id number]  Exp: 7/3/24    EBL: minimal    Complications: none    ASSESSMENT:     ICD-10-CM    1. Routine postpartum follow-up  Z39.2    2. Screening for malignant neoplasm of cervix  Z12.4 PAP imaged thin layer screen   3. Insertion of implantable subdermal contraceptive  Z30.017 etonogestrel (NEXPLANON) 68 MG IMPL     etonogestrel (NEXPLANON) subdermal implant 68 mg     INSERTION NON-BIODEGRADABLE DRUG DELIVERY IMPLANT   4. Nexplanon insertion  Z30.017 HCG qualitative urine     HCG qualitative urine        PLAN:    Given 's handouts, including when to have Nexplanon removed, list of danger s/sx, side effects and follow up recommended. Encouraged condom use for prevention of STD. Back up contraception advised for 7 days. Advised to call for any fever, for prolonged or severe pain or bleeding, abnormal vaginal dischage. She was advised to use pain medications (ibuprofen) as needed for mild to moderate pain.     Mike Pa CNM

## 2022-09-22 NOTE — PATIENT INSTRUCTIONS
What Nexplanon Users May Expect    For appropiate patients, Nexplanon is well tolerated and has a low early-removal rate.    Insertion site complications, such as prolonged pain or infection, are rare. Removal is occasionally difficult, and rarely requires a surgical procedure in the operating room.    Menstrual changes are common with Nexplanon. Bleeding may become more or less frequent or heavy, or absent. The bleeding pattern after the first three months is predictive of future bleeding, but the pattern may change at any time. Average bleeding is 18 days over 3 months. Over 50% of women experience rare over absent bleeding over the two year period, while 25% experience frequent or prolonged bleeding.    In clinical studies, users gained 3.7 pounds over two years. It is unknown what portion of this weight gain is related to Nexplanon    Women with a history of depressed mood may have worsening on Nexplanon, and may need to have the device removed.    Return to baseline ovulation patterns is seen 7-14 days after removal of Nexplanon.    Rarely, headaches and acne have also let to device removal.    Nexplanon may be less effective in women weight more than 130% of their ideal body weight.    Nexplanon does not protect against HIV or STDs.    Please call [facility] at [phone] if you have questions or concerns.    For more complete information:  [email or web address]

## 2022-09-26 LAB
BKR LAB AP GYN ADEQUACY: NORMAL
BKR LAB AP GYN INTERPRETATION: NORMAL
BKR LAB AP HPV REFLEX: NORMAL
BKR LAB AP PREVIOUS ABNORMAL: NORMAL
PATH REPORT.COMMENTS IMP SPEC: NORMAL
PATH REPORT.COMMENTS IMP SPEC: NORMAL
PATH REPORT.RELEVANT HX SPEC: NORMAL

## 2022-10-13 ENCOUNTER — MEDICAL CORRESPONDENCE (OUTPATIENT)
Dept: HEALTH INFORMATION MANAGEMENT | Facility: CLINIC | Age: 27
End: 2022-10-13

## 2022-11-23 ENCOUNTER — TELEPHONE (OUTPATIENT)
Dept: MIDWIFE SERVICES | Facility: CLINIC | Age: 27
End: 2022-11-23

## 2022-11-23 DIAGNOSIS — N92.1 BREAKTHROUGH BLEEDING ON NEXPLANON: Primary | ICD-10-CM

## 2022-11-23 DIAGNOSIS — Z97.5 BREAKTHROUGH BLEEDING ON NEXPLANON: Primary | ICD-10-CM

## 2022-11-23 RX ORDER — NORETHINDRONE ACETATE AND ETHINYL ESTRADIOL 1MG-20(21)
1 KIT ORAL DAILY
Qty: 84 TABLET | Refills: 0 | Status: SHIPPED | OUTPATIENT
Start: 2022-11-23 | End: 2023-04-19

## 2022-11-23 NOTE — TELEPHONE ENCOUNTER
Reason for call:  Other   Patient called regarding (reason for call): call back  Additional comments: Pt has questions about her current birth control    Phone number to reach patient:  Home number on file [phone] (home)    Best Time:  n/a    Can we leave a detailed message on this number?  YES    Travel screening: Not Applicable

## 2022-11-23 NOTE — TELEPHONE ENCOUNTER
TC to patient to discuss her bleeding. We discussed this can be a side effect of the nexplanon and she has 3 options moving forward.   1. Wait and see what happens. Sometimes bodies can adjust to the nexplanon. She stopped breastfeeding in Sept and delivered her baby in June so her body is going through a lot of changes.   2. Take birth control pills to help control the bleeding. She can take them for 3 months and then stop and see how the bleeding is at that time. This will give her a chance to keep the Nexplanon. She has used the nexplanon in the past and it has worked well for her. This was prior to her children.   3. She can make an appointment and we can take the Nexplanon out and she can try something different. She would like at least a 3 year break from having kids. She is not great about taking pills so does not want to rely on just that. We discussed an IUD.     She decided to try the pills. Will set an alarm to see if she can be good about taking them daily. Discussed if she is not good about taking them daily the irregular hormone levels will not help with her bleeding. She reports understanding. She will let us know how the bleeding is after this course of pills. Can return anytime if the bleeding does not resolve to have the Nexplanon taken out.   ELIZABETH Snyder CNM

## 2022-11-23 NOTE — TELEPHONE ENCOUNTER
Pt. Has been bleeding daily since her nexplanon insertion in September.   Bleeding varies.  On max heavy day, changing pad 6x a day.   On a light day, changing pad 3-4x a day.   Sometimes passing several small clots.  Mild-Moderate period cramps.  No recent intercourse.   No recent fevers.     Sending to on-call CNM to advise on next plan.

## 2023-01-28 ENCOUNTER — HEALTH MAINTENANCE LETTER (OUTPATIENT)
Age: 28
End: 2023-01-28

## 2023-03-02 ENCOUNTER — HOSPITAL ENCOUNTER (EMERGENCY)
Facility: CLINIC | Age: 28
Discharge: HOME | End: 2023-03-02
Attending: EMERGENCY MEDICINE | Admitting: EMERGENCY MEDICINE
Payer: COMMERCIAL

## 2023-03-02 ENCOUNTER — APPOINTMENT (OUTPATIENT)
Dept: GENERAL RADIOLOGY | Facility: CLINIC | Age: 28
End: 2023-03-02
Attending: EMERGENCY MEDICINE
Payer: COMMERCIAL

## 2023-03-02 VITALS
OXYGEN SATURATION: 98 % | DIASTOLIC BLOOD PRESSURE: 75 MMHG | SYSTOLIC BLOOD PRESSURE: 120 MMHG | BODY MASS INDEX: 28.93 KG/M2 | HEART RATE: 89 BPM | HEIGHT: 66 IN | WEIGHT: 180 LBS | RESPIRATION RATE: 17 BRPM | TEMPERATURE: 98.4 F

## 2023-03-02 DIAGNOSIS — S93.409A SPRAIN OF ANKLE, UNSPECIFIED LATERALITY, UNSPECIFIED LIGAMENT, INITIAL ENCOUNTER: ICD-10-CM

## 2023-03-02 PROCEDURE — 99283 EMERGENCY DEPT VISIT LOW MDM: CPT

## 2023-03-02 PROCEDURE — 250N000013 HC RX MED GY IP 250 OP 250 PS 637: Performed by: EMERGENCY MEDICINE

## 2023-03-02 PROCEDURE — 73610 X-RAY EXAM OF ANKLE: CPT | Mod: LT

## 2023-03-02 RX ORDER — IBUPROFEN 600 MG/1
600 TABLET, FILM COATED ORAL ONCE
Status: COMPLETED | OUTPATIENT
Start: 2023-03-02 | End: 2023-03-02

## 2023-03-02 RX ADMIN — IBUPROFEN 600 MG: 600 TABLET ORAL at 02:56

## 2023-03-02 ASSESSMENT — ENCOUNTER SYMPTOMS: ARTHRALGIAS: 1

## 2023-03-02 ASSESSMENT — ACTIVITIES OF DAILY LIVING (ADL): ADLS_ACUITY_SCORE: 35

## 2023-03-02 NOTE — ED TRIAGE NOTES
Ambulatory. Reports she fell on ice yesterday at 1800 and c/o left ankle pain. No meds PTA     Triage Assessment     Row Name 03/02/23 9970       Triage Assessment (Adult)    Airway WDL WDL       Respiratory WDL    Respiratory WDL WDL       Skin Circulation/Temperature WDL    Skin Circulation/Temperature WDL WDL       Cardiac WDL    Cardiac WDL WDL       Peripheral/Neurovascular WDL    Peripheral Neurovascular WDL WDL       Cognitive/Neuro/Behavioral WDL    Cognitive/Neuro/Behavioral WDL WDL

## 2023-03-02 NOTE — ED PROVIDER NOTES
"  History     Chief Complaint:  Ankle Pain       The history is provided by the patient.      Marielena Rodriguez is a 27 year old female who presents with left ankle pain. The patient was picking up her daughter from  today when she slipped on some ice while holding her daughter, falling and twisting her left ankle.     Independent Historian:   None - Patient Only    Review of External Notes: none      ROS:  Review of Systems   Musculoskeletal: Positive for arthralgias.   All other systems reviewed and are negative.    Allergies:  The patient has no known allergies      Medications:    Nexplanon   Junel Fe  Senokot     Past Medical History:    Anemia   Carrier of group B Streptococcus     Family History:    family history includes Diabetes in her maternal grandmother; No Known Problems in her father and mother.    Social History:   reports that she has never smoked. She has never used smokeless tobacco. She reports that she does not drink alcohol and does not use drugs.  PCP: No Ref-Primary, Physician     Physical Exam     Patient Vitals for the past 24 hrs:   BP Temp Temp src Pulse Resp SpO2 Height Weight   03/02/23 0252 117/73 98.4  F (36.9  C) Oral 89 17 98 % 1.676 m (5' 6\") 81.6 kg (180 lb)        Physical Exam  Nursing note and vitals reviewed.  General: Oriented to person, place, and time. Appears well-developed and well-nourished.   Head: No signs of trauma.   Mouth/Throat: Oropharynx is clear and moist.   Eyes: Conjunctivae are normal. Pupils are equal, round, and reactive to light.   Neck: Normal range of motion. No nuchal rigidity.   Cardiovascular: Normal rate and regular rhythm.    Respiratory: Effort normal and breath sounds normal. No respiratory distress.   Abdominal: Soft. There is no tenderness. There is no guarding.   Musculoskeletal: Normal range of motion. no edema.   Neurological: The patient is alert and oriented to person, place, and time.  PERRLA, EOMI, visual fields intact, strength in " upper/lower extremities normal and symmetrical.   Sensation normal. Speech normal  GCS eye subscore is 4. GCS verbal subscore is 5. GCS motor subscore is 6.   Skin: Skin is warm and dry. No rash noted.   Psychiatric: normal mood and affect. behavior is normal.     Emergency Department Course     Imaging:  XR Ankle Left G/E 3 Views   Final Result   IMPRESSION: Negative for fracture or dislocation. Ankle mortise is intact.         Report per radiology    Emergency Department Course & Assessments:         Interventions:  Medications   ibuprofen (ADVIL/MOTRIN) tablet 600 mg (600 mg Oral $Given 3/2/23 0256)        Independent Interpretation (X-rays, CTs, rhythm strip):  None      Consultations/Discussion of Management or Tests:  None      Social Determinants of Health affecting care:   None    Assessments:  0410 I examined the patient and obtained history     Disposition:  The patient was discharged to home.     Impression & Plan    CMS Diagnoses: None    Medical Decision Making:  Mairelena Rodriguez is a 27 year old female who presents for evaluation of ankle pain.  Signs and symptoms are consistent with an ankle sprain.  This involves ligaments of the anterior lateral ankle by clinical exam. No signs of septic arthritis, gout, pseudogout, fracture, cellulitis, etc.  There are no signs of fracture.  The patients neurovascular status is normal. A head to toe trauma exam is otherwise negative; the likelihood of other serious sequelae of trauma (spine, head, chest, abdomen, other extremities, pelvis) is low.  Plan is for protected weightbearing, RICE treatment with ice 15 minutes on, 1 hour off, ace wrap.  Patient will advance weightbearing and follow-up with primary in 2-3 days.  The patient was placed in a removable gel splint.    Diagnosis:    ICD-10-CM    1. Sprain of ankle, unspecified laterality, unspecified ligament, initial encounter  S93.409A            Discharge Medications:  New Prescriptions    No medications on  file        Scribe Disclosure:  I, Curt Nathalie, am serving as a scribe at 4:18 AM on 3/2/2023 to document services personally performed by Raul Phan MD based on my observations and the provider's statements to me.     3/2/2023   Raul Phan MD Joing, Todd Roger, MD  03/03/23 0110

## 2023-03-02 NOTE — LETTER
March 2, 2023      To Whom It May Concern:      Marielena Rodriguez was seen in our Emergency Department today, 03/02/23.  I expect her condition to improve over the next 1-3 days.  She may return to work/school when improved.    Sincerely,      Dr. Raul Phan

## 2023-04-19 ENCOUNTER — TELEPHONE (OUTPATIENT)
Dept: MIDWIFE SERVICES | Facility: CLINIC | Age: 28
End: 2023-04-19
Payer: COMMERCIAL

## 2023-04-19 DIAGNOSIS — N92.1 BREAKTHROUGH BLEEDING ON NEXPLANON: ICD-10-CM

## 2023-04-19 DIAGNOSIS — Z97.5 BREAKTHROUGH BLEEDING ON NEXPLANON: ICD-10-CM

## 2023-04-19 RX ORDER — NORETHINDRONE ACETATE AND ETHINYL ESTRADIOL 1MG-20(21)
1 KIT ORAL DAILY
Qty: 84 TABLET | Refills: 0 | Status: SHIPPED | OUTPATIENT
Start: 2023-04-19 | End: 2023-08-23

## 2023-04-19 NOTE — CONFIDENTIAL NOTE
Tried calling patient - unable to leave voicemail.  Sent [employer] message that an additional 3 month refill sent.    Ai Sandoval RN

## 2023-04-19 NOTE — TELEPHONE ENCOUNTER
Reason for call:  Medication   If this is a refill request, has the caller requested the refill from the pharmacy already? Yes  Will the patient be using a [facility]? No  Name of the pharmacy and phone number for the current request: [facility] on [address], [phone]    Name of the medication requested: Norethindrone-ethinyl estradiol    Other request: Marielena called requested a refill on her birth control medication. She stated that she was given this medication to help with bleeding, and it has been working well. She preferred to request a 3 month refill and then would come into the clinic after to assess bleeding and birth control. She is travelling and will be gone next month to mid August. See note from Shabana Reynoso in November for information on why birth control was prescribed. Patient stated she has a busy work schedule and might not be able to come into the clinic before she leaves.     Phone number to reach patient:  Home number on file [phone] (home)    Best Time:  Anytime    Can we leave a detailed message on this number?  YES    Travel screening: Not Applicable

## 2023-04-19 NOTE — TELEPHONE ENCOUNTER
Pt called requesting refill of oral BC    Per TE with Shabana in November-Take birth control pills to help control the bleeding. She can take them for 3 months and then stop and see how the bleeding is at that time. This will give her a chance to keep the Nexplanon. She has used the nexplanon in the past and it has worked well for her. This was prior to her children.   3. She can make an appointment and we can take the Nexplanon out and she can try something different. She would like at least a 3 year break from having kids. She is not great about taking pills so does not want to rely on just that. We discussed an IUD.   She decided to try the pills. Will set an alarm to see if she can be good about taking them daily. Discussed if she is not good about taking them daily the irregular hormone levels will not help with her bleeding. She reports understanding. She will let us know how the bleeding is after this course of pills. Can return anytime if the bleeding does not resolve to have the Nexplanon taken out.     Per  today:  Other request: Marielena called requested a refill on her birth control medication. She stated that she was given this medication to help with bleeding, and it has been working well. She preferred to request a 3 month refill and then would come into the clinic after to assess bleeding and birth control. She is travelling and will be gone next month to mid August. See note from Shabana Reynoso in November for information on why birth control was prescribed. Patient stated she has a busy work schedule and might not be able to come into the clinic before she leaves.        Okay to send in refill for birth control or want her to set up a phone visit at least?  Routing to provider to advise.  Chika Serrano, RN on 4/19/2023 at 1:39 PM

## 2023-08-23 DIAGNOSIS — Z97.5 BREAKTHROUGH BLEEDING ON NEXPLANON: ICD-10-CM

## 2023-08-23 DIAGNOSIS — N92.1 BREAKTHROUGH BLEEDING ON NEXPLANON: ICD-10-CM

## 2023-08-23 RX ORDER — NORETHINDRONE ACETATE AND ETHINYL ESTRADIOL 1MG-20(21)
1 KIT ORAL DAILY
Qty: 84 TABLET | Refills: 0 | Status: SHIPPED | OUTPATIENT
Start: 2023-08-23 | End: 2023-11-13

## 2023-08-23 NOTE — TELEPHONE ENCOUNTER
Requested Prescriptions   Pending Prescriptions Disp Refills    norethindrone-ethinyl estradiol (JUNEL FE 1/20) 1-20 MG-MCG tablet 84 tablet 0     Sig: Take 1 tablet by mouth daily       There is no refill protocol information for this order        Last Written Prescription Date:  4/19/23  Last Fill Quantity: 84,  # refills: 0   Last office visit: 9/22/2022 ; last virtual visit: Visit date not found with prescribing provider: Mike Pa CNM  Future Office Visit:    9/8 with JYOTI at [facility]    Medication is being filled for 1 time refill only due to:  Patient needs to be seen because it has been more than one year since last visit.  Chika Serrano RN on 8/23/2023 at 3:44 PM            
none

## 2023-11-13 DIAGNOSIS — Z97.5 BREAKTHROUGH BLEEDING ON NEXPLANON: ICD-10-CM

## 2023-11-13 DIAGNOSIS — N92.1 BREAKTHROUGH BLEEDING ON NEXPLANON: ICD-10-CM

## 2023-11-13 RX ORDER — NORETHINDRONE ACETATE AND ETHINYL ESTRADIOL 1MG-20(21)
1 KIT ORAL DAILY
Qty: 84 TABLET | Refills: 0 | Status: SHIPPED | OUTPATIENT
Start: 2023-11-13 | End: 2024-03-01

## 2023-12-14 NOTE — LETTER
November 18, 2019      To Whom It May Concern:      Dannie Pearson was in our Emergency Department today, 11/18/19, with family member. Please excuse from work.    Sincerely,        Diamond MADDEN RN         ADMIT

## 2024-02-13 DIAGNOSIS — N92.1 BREAKTHROUGH BLEEDING ON NEXPLANON: ICD-10-CM

## 2024-02-13 DIAGNOSIS — Z97.5 BREAKTHROUGH BLEEDING ON NEXPLANON: ICD-10-CM

## 2024-02-13 RX ORDER — NORETHINDRONE ACETATE AND ETHINYL ESTRADIOL 1MG-20(21)
1 KIT ORAL DAILY
Qty: 84 TABLET | Refills: 0 | OUTPATIENT
Start: 2024-02-13

## 2024-02-25 ENCOUNTER — HEALTH MAINTENANCE LETTER (OUTPATIENT)
Age: 29
End: 2024-02-25

## 2024-03-01 ENCOUNTER — OFFICE VISIT (OUTPATIENT)
Dept: MIDWIFE SERVICES | Facility: CLINIC | Age: 29
End: 2024-03-01
Payer: COMMERCIAL

## 2024-03-01 VITALS
DIASTOLIC BLOOD PRESSURE: 78 MMHG | WEIGHT: 179.3 LBS | HEART RATE: 103 BPM | OXYGEN SATURATION: 98 % | SYSTOLIC BLOOD PRESSURE: 115 MMHG | HEIGHT: 66 IN | BODY MASS INDEX: 28.82 KG/M2

## 2024-03-01 DIAGNOSIS — Z13.0 SCREENING FOR IRON DEFICIENCY ANEMIA: ICD-10-CM

## 2024-03-01 DIAGNOSIS — Z11.3 SCREEN FOR STD (SEXUALLY TRANSMITTED DISEASE): ICD-10-CM

## 2024-03-01 DIAGNOSIS — Z01.419 ENCOUNTER FOR GYNECOLOGICAL EXAMINATION WITHOUT ABNORMAL FINDING: Primary | ICD-10-CM

## 2024-03-01 DIAGNOSIS — Z13.220 LIPID SCREENING: ICD-10-CM

## 2024-03-01 DIAGNOSIS — Z30.011 ENCOUNTER FOR INITIAL PRESCRIPTION OF CONTRACEPTIVE PILLS: ICD-10-CM

## 2024-03-01 DIAGNOSIS — Z30.46 ENCOUNTER FOR NEXPLANON REMOVAL: ICD-10-CM

## 2024-03-01 DIAGNOSIS — Z13.1 SCREENING FOR DIABETES MELLITUS: ICD-10-CM

## 2024-03-01 LAB
ERYTHROCYTE [DISTWIDTH] IN BLOOD BY AUTOMATED COUNT: 13.2 % (ref 10–15)
HBA1C MFR BLD: 5.5 % (ref 0–5.6)
HCT VFR BLD AUTO: 44.3 % (ref 35–47)
HGB BLD-MCNC: 14.2 G/DL (ref 11.7–15.7)
MCH RBC QN AUTO: 27.3 PG (ref 26.5–33)
MCHC RBC AUTO-ENTMCNC: 32.1 G/DL (ref 31.5–36.5)
MCV RBC AUTO: 85 FL (ref 78–100)
PLATELET # BLD AUTO: 235 10E3/UL (ref 150–450)
RBC # BLD AUTO: 5.2 10E6/UL (ref 3.8–5.2)
WBC # BLD AUTO: 7.4 10E3/UL (ref 4–11)

## 2024-03-01 PROCEDURE — 83036 HEMOGLOBIN GLYCOSYLATED A1C: CPT | Performed by: ADVANCED PRACTICE MIDWIFE

## 2024-03-01 PROCEDURE — 11982 REMOVE DRUG IMPLANT DEVICE: CPT | Performed by: ADVANCED PRACTICE MIDWIFE

## 2024-03-01 PROCEDURE — 99395 PREV VISIT EST AGE 18-39: CPT | Mod: 25 | Performed by: ADVANCED PRACTICE MIDWIFE

## 2024-03-01 PROCEDURE — 80061 LIPID PANEL: CPT | Performed by: ADVANCED PRACTICE MIDWIFE

## 2024-03-01 PROCEDURE — 36415 COLL VENOUS BLD VENIPUNCTURE: CPT | Performed by: ADVANCED PRACTICE MIDWIFE

## 2024-03-01 PROCEDURE — 85027 COMPLETE CBC AUTOMATED: CPT | Performed by: ADVANCED PRACTICE MIDWIFE

## 2024-03-01 RX ORDER — NORETHINDRONE ACETATE AND ETHINYL ESTRADIOL 1MG-20(21)
1 KIT ORAL DAILY
Qty: 84 TABLET | Refills: 0 | Status: SHIPPED | OUTPATIENT
Start: 2024-03-01

## 2024-03-01 NOTE — PROGRESS NOTES
"Marielena is a 28 year old  female who presents for annual exam. She would also like her Nexplanon out. She has had irregular bleeding with it and ready to be done. She took birth control pills which helped while taking them but bleeding returned after the pills were done. She desires an IUD but will return after Ramadan for that. She would like pills in the mean time, RX sent. Discussed condom use for the first few weeks. She is due for annual labs which were collected. STI testing accepted. Pap smear not due, due next  which we discussed.   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  with 2 kids.      Menses are irregular due to the nexplanon. Using nexplanon for contraception.  She is not currently considering pregnancy.  Besides routine health maintenance,  she would like to discuss nexplanon removal.  GYNECOLOGIC HISTORY:  Menarche: 14  Age at first intercourse: 23 Number of lifetime partners: less than 5  Marielena is sexually active with 1male partner(s) and is currently in monogamous relationship with .    History sexually transmitted infections: No STD history  STI testing offered?  Accepted  JUDI exposure: No  History of abnormal Pap smear: YES - updated in Problem List and Health Maintenance accordingly  Family history of breast CA: No  Family history of uterine/ovarian CA: No    Family history of colon CA: No    HEALTH MAINTENANCE:  Cholesterol: (No results found for: \"CHOL\" History of abnormal lipids: No  Mammo: NO . History of abnormal Mammo: Not applicable.  Regular Self Breast Exams: No  Calcium/Vitamin D intake: source:  dairy Adequate? Yes  TSH: (No results found for: \"TSH\" )  Pap; (No results found for: \"PAP\" )    HISTORY:  OB History    Para Term  AB Living   2 2 2 0 0 2   SAB IAB Ectopic Multiple Live Births   0 0 0 0 2      # Outcome Date GA Lbr Earl/2nd Weight Sex Delivery Anes PTL Lv   2 Term 24 41w2d 03:39 / 00:34 3.33 kg (7 lb 5.5 oz) M Vag-Spont EPI N " ANGEL LUIS      Name: Sumit Rodriguez      Apgar1: 8  Apgar5: 9   1 Term 06/10/20 39w3d 11:45 / 01:56 2.95 kg (6 lb 8.1 oz) F Vag-Spont EPI, Local  ANGEL LUIS      Complications: Dysfunctional Labor, GBS      Name: Consuelo      Apgar1: 8  Apgar5: 9     Past Medical History:   Diagnosis Date    Anemia due to blood loss, acute 6/24/2022    Carrier of group B Streptococcus      Past Surgical History:   Procedure Laterality Date    NO HISTORY OF SURGERY  12/10/2018     Family History   Problem Relation Age of Onset    Fibroids Mother     No Known Problems Father     Diabetes Maternal Grandmother     Diabetes Maternal Uncle      Social History     Socioeconomic History    Marital status:      Spouse name: None    Number of children: None    Years of education: None    Highest education level: None   Tobacco Use    Smoking status: Never    Smokeless tobacco: Never   Vaping Use    Vaping Use: Never used   Substance and Sexual Activity    Alcohol use: No    Drug use: No    Sexual activity: Yes     Partners: Male     Birth control/protection: None       Current Outpatient Medications:     norethindrone-ethinyl estradiol (BLISOVI FE 1/20) 1-20 MG-MCG tablet, Take 1 tablet by mouth daily, Disp: 84 tablet, Rfl: 0  No current facility-administered medications for this visit.    Facility-Administered Medications Ordered in Other Visits:     lidocaine (PF) (XYLOCAINE) 1 % injection, , , PRN, Olinda Toro MD, 2 mL at 06/10/20 0434    lidocaine-EPINEPHrine 1.5 %-1:[id number] injection, , EPIDURAL, PRN, Olinda Toro MD, 2 mL at 06/10/20 0432   No Known Allergies    Past medical, surgical, social and family history were reviewed and updated in EPIC.    ROS:   C:     NEGATIVE for fever, chills, change in weight  I:       NEGATIVE for worrisome rashes, moles or lesions  E:     NEGATIVE for vision changes or irritation  E/M: NEGATIVE for ear, mouth and throat problems  R:     NEGATIVE for significant cough or SOB  CV:   NEGATIVE for chest  "pain, palpitations or peripheral edema  GI:     NEGATIVE for nausea, abdominal pain, heartburn, or change in bowel habits  :   NEGATIVE for frequency, dysuria, hematuria, vaginal discharge, or irregular bleeding  M:     NEGATIVE for significant arthralgias or myalgia  N:      NEGATIVE for weakness, dizziness or paresthesias  E:      NEGATIVE for temperature intolerance, skin/hair changes  P:      NEGATIVE for changes in mood or affect.    EXAM:  /78 (BP Location: Left arm, Patient Position: Sitting, Cuff Size: Adult Regular)   Pulse 103   Ht 1.676 m (5' 6\")   Wt 81.3 kg (179 lb 4.8 oz)   LMP  (LMP Unknown)   SpO2 98%   BMI 28.94 kg/m     BMI: Body mass index is 28.94 kg/m .  Constitutional: healthy, alert and no distress  Head: Normocephalic. No masses, lesions, tenderness or abnormalities  Neck: Neck supple. Trachea midline. No adenopathy. Thyroid symmetric, normal size.   Cardiovascular: RRR.   Respiratory: Negative.   Breast: symmetrical and non tender, no masses, nipples everted, no discharge, SBE taught  Gastrointestinal: Abdomen soft, non-tender, non-distended. No masses, organomegaly.  : Deferred per patient request due to bleeding   Musculoskeletal: extremities normal  Skin: no suspicious lesions or rashes  Psychiatric: Affect appropriate, cooperative,mentation appears normal.     COUNSELING:   Reviewed preventive health counseling, as reflected in patient instructions   reports that she has never smoked. She has never used smokeless tobacco.    Body mass index is 28.94 kg/m .    FRAX Risk Assessment    ASSESSMENT:  28 year old female with satisfactory annual exam  (Z01.419) Encounter for gynecological examination without abnormal finding  (primary encounter diagnosis)  Plan: Follow up for annual visits or PRN.     (Z30.011) Encounter for initial prescription of contraceptive pills  Plan: norethindrone-ethinyl estradiol (BLISOVI FE         1/20) 1-20 MG-MCG tablet    (Z11.3) Screen for STD " (sexually transmitted disease)  Plan: Chlamydia trachomatis/Neisseria gonorrhoeae by         PCR    (Z13.0) Screening for iron deficiency anemia  Plan: CBC with platelets    (Z13.220) Lipid screening  Plan: Lipid panel reflex to direct LDL Fasting    (Z13.1) Screening for diabetes mellitus  Plan: Hemoglobin A1c    (Z30.46) Encounter for Nexplanon removal  Nexplanon Removal:     Is a pregnancy test required: No.  Was a consent obtained?  Yes    Marielena Rodriguez is here for removal of etonogestrel implant Nexplanon/Implanon    Indication: abnormal uterine bleeding       Preoperative Diagnosis: etonogestrel implant  Postoperative Diagnosis: etonogestrel implant removed    Technique: On the left arm  Skin prep Betadine  Anesthesia 1% lidocaine  Procedure: Small incision (<5mm) was made at distal end of palpable implant, curved hemostat or mosquito forceps was used to isolate the implant and bring it to the incision, the fibrous capsule containing the implant  was incised and the Implant was removed intact.    EBL: minimal  Complications:  No  Tolerance:  Pt tolerated procedure well and was in stable condition.   Dressing:    A pressure bandage was placed for the next 12-24 hours.    Contraception was discussed and patient chose the following method pills and planning IUD placement end of April.     Follow up: Pt was instructed to call if bleeding, severe pain or foul smell.     ELIZABETH Snyder CNM

## 2024-03-02 LAB
CHOLEST SERPL-MCNC: 187 MG/DL
FASTING STATUS PATIENT QL REPORTED: NO
HDLC SERPL-MCNC: 42 MG/DL
LDLC SERPL CALC-MCNC: 122 MG/DL
NONHDLC SERPL-MCNC: 145 MG/DL
TRIGL SERPL-MCNC: 114 MG/DL

## 2024-04-02 ENCOUNTER — E-VISIT (OUTPATIENT)
Dept: URGENT CARE | Facility: CLINIC | Age: 29
End: 2024-04-02
Payer: COMMERCIAL

## 2024-04-02 DIAGNOSIS — L70.0 ACNE VULGARIS: Primary | ICD-10-CM

## 2024-04-02 PROCEDURE — 99207 PR NON-BILLABLE SERV PER CHARTING: CPT | Performed by: EMERGENCY MEDICINE

## 2024-04-02 RX ORDER — TRETINOIN 0.1 MG/G
GEL TOPICAL AT BEDTIME
Qty: 45 G | Refills: 2 | Status: SHIPPED | OUTPATIENT
Start: 2024-04-02

## 2024-07-26 ENCOUNTER — TELEPHONE (OUTPATIENT)
Dept: MIDWIFE SERVICES | Facility: CLINIC | Age: 29
End: 2024-07-26
Payer: COMMERCIAL

## 2024-07-26 NOTE — TELEPHONE ENCOUNTER
[facility] Call Center    Phone Message    May a detailed message be left on voicemail: yes     Reason for Call: Other: Pt is looking to speak to care team regarding some forms that she has from her school, requiring her to have proof of annual physical exam. Pt wondering if she is able to get this form signed since she had an annual exam in 03/2024. Please call pt back to discuss.      Action Taken: Other: OBGYN    Travel Screening: Not Applicable     Date of Service:

## 2024-09-11 ENCOUNTER — PATIENT OUTREACH (OUTPATIENT)
Dept: CARE COORDINATION | Facility: CLINIC | Age: 29
End: 2024-09-11
Payer: COMMERCIAL

## 2024-09-11 NOTE — PROGRESS NOTES
Clinic Care Coordination Contact  Program:   [facility]: [place]     Renewal:[facility]   Date Applied:      DEANNA Outreach:   9/11/24: CTA called to see if patient needed assistance with their [employer] Renewal. Patient declined needing assistance and no follow up needed   Ira Aly  Care   [facility]  Clinic Care Coordination  [phone]       Health Insurance:        Referral/Screening:

## 2024-10-14 ENCOUNTER — LAB (OUTPATIENT)
Dept: LAB | Facility: CLINIC | Age: 29
End: 2024-10-14
Payer: COMMERCIAL

## 2024-10-14 DIAGNOSIS — N91.2 AMENORRHEA: ICD-10-CM

## 2024-10-14 LAB — HCG INTACT+B SERPL-ACNC: <1 MIU/ML

## 2024-10-14 PROCEDURE — 84702 CHORIONIC GONADOTROPIN TEST: CPT

## 2024-10-14 PROCEDURE — 36415 COLL VENOUS BLD VENIPUNCTURE: CPT

## 2024-10-14 NOTE — RESULT ENCOUNTER NOTE
Dear Marielena,    Your test results are attached below. Your test was negative for pregnancy.  If you have any questions, please contact me via [employer] or you can call our office at [phone].    Letty Cheema CNM, ELIZABETH MONTES, CNM